# Patient Record
Sex: FEMALE | Race: WHITE | Employment: OTHER | URBAN - METROPOLITAN AREA
[De-identification: names, ages, dates, MRNs, and addresses within clinical notes are randomized per-mention and may not be internally consistent; named-entity substitution may affect disease eponyms.]

---

## 2018-04-03 PROBLEM — Z51.81 ENCOUNTER FOR MONITORING CHRONIC NSAID THERAPY: Status: ACTIVE | Noted: 2018-04-03

## 2018-04-03 PROBLEM — I73.00 RAYNAUD'S DISEASE WITHOUT GANGRENE: Status: ACTIVE | Noted: 2018-04-03

## 2018-04-03 PROBLEM — Z79.1 ENCOUNTER FOR MONITORING CHRONIC NSAID THERAPY: Status: ACTIVE | Noted: 2018-04-03

## 2018-04-03 PROBLEM — M54.2 CHRONIC NECK PAIN: Status: ACTIVE | Noted: 2018-04-03

## 2018-04-03 PROBLEM — Z20.820 EXPOSURE TO VARICELLA ZOSTER VIRUS (VZV): Status: ACTIVE | Noted: 2018-04-03

## 2018-04-03 PROBLEM — R53.82 CHRONIC FATIGUE: Status: ACTIVE | Noted: 2018-04-03

## 2018-04-03 PROBLEM — M54.31 SCIATICA OF RIGHT SIDE: Status: ACTIVE | Noted: 2018-04-03

## 2018-04-03 PROBLEM — G89.29 CHRONIC NECK PAIN: Status: ACTIVE | Noted: 2018-04-03

## 2018-05-03 PROBLEM — Z00.00 ROUTINE GENERAL MEDICAL EXAMINATION AT A HEALTH CARE FACILITY: Status: ACTIVE | Noted: 2018-05-03

## 2018-05-03 PROBLEM — Z23 ENCOUNTER FOR IMMUNIZATION: Status: ACTIVE | Noted: 2018-05-03

## 2018-08-30 PROBLEM — L03.011 PARONYCHIA OF FINGER OF RIGHT HAND: Status: ACTIVE | Noted: 2018-08-30

## 2019-07-10 PROBLEM — Z13.820 SCREENING FOR OSTEOPOROSIS: Status: ACTIVE | Noted: 2019-07-10

## 2019-08-23 ENCOUNTER — HOSPITAL ENCOUNTER (OUTPATIENT)
Dept: MAMMOGRAPHY | Age: 79
Discharge: HOME | End: 2019-08-23
Attending: FAMILY MEDICINE
Payer: COMMERCIAL

## 2019-08-23 DIAGNOSIS — Z13.820 SCREENING FOR OSTEOPOROSIS: ICD-10-CM

## 2019-08-23 DIAGNOSIS — Z00.00 ROUTINE GENERAL MEDICAL EXAMINATION AT A HEALTH CARE FACILITY: ICD-10-CM

## 2019-08-23 PROBLEM — M85.852 OSTEOPENIA OF NECK OF LEFT FEMUR: Status: ACTIVE | Noted: 2019-08-23

## 2019-08-23 PROCEDURE — 77080 DXA BONE DENSITY AXIAL: CPT

## 2019-08-23 NOTE — PROGRESS NOTES
Please let her know her scan showed osteopenia bordering on osteoporosis of the L femoral neck. At this point we just need to make sure her calcium and D levels are adequate. I'll check a D level when I see her back. We'll repeat the scan in 2 years.

## 2019-09-19 PROBLEM — Z13.820 SCREENING FOR OSTEOPOROSIS: Status: RESOLVED | Noted: 2019-07-10 | Resolved: 2019-09-19

## 2019-09-24 PROBLEM — Z00.00 ROUTINE GENERAL MEDICAL EXAMINATION AT A HEALTH CARE FACILITY: Status: RESOLVED | Noted: 2018-05-03 | Resolved: 2019-09-24

## 2019-09-24 PROBLEM — Z23 ENCOUNTER FOR IMMUNIZATION: Status: RESOLVED | Noted: 2018-05-03 | Resolved: 2019-09-24

## 2020-02-10 PROBLEM — H65.03 NON-RECURRENT ACUTE SEROUS OTITIS MEDIA OF BOTH EARS: Status: ACTIVE | Noted: 2020-02-10

## 2020-02-10 PROBLEM — J01.00 ACUTE NON-RECURRENT MAXILLARY SINUSITIS: Status: ACTIVE | Noted: 2020-02-10

## 2020-02-10 PROBLEM — R68.89 FLU-LIKE SYMPTOMS: Status: ACTIVE | Noted: 2020-02-10

## 2020-07-23 PROBLEM — E55.9 VITAMIN D DEFICIENCY: Status: ACTIVE | Noted: 2020-07-23

## 2020-07-23 PROBLEM — R53.83 CAREGIVER WITH FATIGUE: Status: ACTIVE | Noted: 2020-07-23

## 2020-07-23 PROBLEM — Z00.00 ENCOUNTER FOR MEDICARE ANNUAL WELLNESS EXAM: Status: ACTIVE | Noted: 2020-07-23

## 2020-07-23 PROBLEM — K02.9 DENTAL CARIES: Status: ACTIVE | Noted: 2020-07-23

## 2020-08-17 PROBLEM — N80.9 ENDOMETRIOSIS: Status: ACTIVE | Noted: 2020-08-17

## 2020-08-17 PROBLEM — R10.31 RIGHT LOWER QUADRANT ABDOMINAL PAIN: Status: ACTIVE | Noted: 2020-08-17

## 2020-08-21 ENCOUNTER — HOSPITAL ENCOUNTER (OUTPATIENT)
Dept: CT IMAGING | Age: 80
Discharge: HOME | End: 2020-08-21
Attending: FAMILY MEDICINE

## 2020-08-21 DIAGNOSIS — N80.9 ENDOMETRIOSIS: ICD-10-CM

## 2020-08-21 DIAGNOSIS — R10.31 RIGHT LOWER QUADRANT ABDOMINAL PAIN: ICD-10-CM

## 2020-08-21 RX ORDER — SODIUM CHLORIDE 0.9 % (FLUSH) 0.9 %
10 SYRINGE (ML) INJECTION
Status: ACTIVE | OUTPATIENT
Start: 2020-08-21 | End: 2020-08-21

## 2020-08-22 PROBLEM — Z00.00 ENCOUNTER FOR MEDICARE ANNUAL WELLNESS EXAM: Status: RESOLVED | Noted: 2020-07-23 | Resolved: 2020-08-22

## 2020-08-24 NOTE — PROGRESS NOTES
CT negative except for constipation. If she hasn't done a completely cleanout in a while she should attempt it. Go to the pharmacy and buy two 32oz bottles of gatorade and a 238g bottle of Miralax. Mix half the Miralax into each of the 32oz bottles. Shake well. Refrigerating can help with the bitter taste. Chug one, if no bowel movement within 2 hours, shake and chug the other. Pt should plan on doing this when they have nothing to do for 12-24 hours. There should be significant stooling, may look dark/green, followed by stools of only clear water. Once that's done I'd recommend getting on a regular fiber supplement daily. I prefer the psyllium capsules, take 4-5 every morning with 12-16oz of water.

## 2021-01-05 ENCOUNTER — HOSPITAL ENCOUNTER (OUTPATIENT)
Dept: GENERAL RADIOLOGY | Age: 81
Discharge: HOME | End: 2021-01-05

## 2021-01-05 DIAGNOSIS — K58.2 IRRITABLE BOWEL SYNDROME WITH BOTH CONSTIPATION AND DIARRHEA: ICD-10-CM

## 2021-01-05 PROBLEM — M25.551 RIGHT HIP PAIN: Status: ACTIVE | Noted: 2021-01-05

## 2021-01-05 PROBLEM — M53.3 PAIN OF RIGHT SACROILIAC JOINT: Status: ACTIVE | Noted: 2021-01-05

## 2021-01-05 PROBLEM — F43.21 GRIEF: Status: ACTIVE | Noted: 2021-01-05

## 2021-02-02 ENCOUNTER — HOSPITAL ENCOUNTER (OUTPATIENT)
Dept: LAB | Age: 81
Discharge: HOME | End: 2021-02-02

## 2021-02-02 PROCEDURE — 88305 TISSUE EXAM BY PATHOLOGIST: CPT

## 2021-02-02 PROCEDURE — 88312 SPECIAL STAINS GROUP 1: CPT

## 2021-04-16 PROBLEM — N18.31 STAGE 3A CHRONIC KIDNEY DISEASE (HCC): Status: ACTIVE | Noted: 2021-04-16

## 2021-04-16 PROBLEM — K25.3 ACUTE GASTRIC ULCER WITHOUT HEMORRHAGE OR PERFORATION: Status: ACTIVE | Noted: 2021-04-16

## 2022-03-17 ENCOUNTER — APPOINTMENT (OUTPATIENT)
Dept: GENERAL RADIOLOGY | Age: 82
End: 2022-03-17
Attending: EMERGENCY MEDICINE
Payer: MEDICARE

## 2022-03-17 ENCOUNTER — HOSPITAL ENCOUNTER (OUTPATIENT)
Age: 82
Setting detail: OBSERVATION
Discharge: HOME | End: 2022-03-18
Attending: EMERGENCY MEDICINE | Admitting: INTERNAL MEDICINE
Payer: MEDICARE

## 2022-03-17 DIAGNOSIS — I95.1 ORTHOSTATIC HYPOTENSION: Primary | ICD-10-CM

## 2022-03-17 DIAGNOSIS — R55 SYNCOPE, UNSPECIFIED SYNCOPE TYPE: ICD-10-CM

## 2022-03-17 PROBLEM — R06.02 SOB (SHORTNESS OF BREATH): Status: ACTIVE | Noted: 2022-03-17

## 2022-03-17 LAB
ABO + RH BLD: NORMAL
ALBUMIN SERPL-MCNC: 3.4 G/DL (ref 3.2–4.6)
ALBUMIN/GLOB SERPL: 0.9 {RATIO} (ref 1.2–3.5)
ALP SERPL-CCNC: 83 U/L (ref 50–136)
ALT SERPL-CCNC: 42 U/L (ref 12–65)
ANION GAP SERPL CALC-SCNC: 2 MMOL/L (ref 7–16)
APPEARANCE UR: CLEAR
AST SERPL-CCNC: 27 U/L (ref 15–37)
ATRIAL RATE: 55 BPM
B PERT DNA SPEC QL NAA+PROBE: NOT DETECTED
BASOPHILS # BLD: 0 K/UL (ref 0–0.2)
BASOPHILS NFR BLD: 0 % (ref 0–2)
BILIRUB SERPL-MCNC: 0.3 MG/DL (ref 0.2–1.1)
BILIRUB UR QL: NEGATIVE
BLOOD GROUP ANTIBODIES SERPL: NORMAL
BNP SERPL-MCNC: 598 PG/ML
BORDETELLA PARAPERTUSSIS PCR, BORPAR: NOT DETECTED
BUN SERPL-MCNC: 11 MG/DL (ref 8–23)
C PNEUM DNA SPEC QL NAA+PROBE: NOT DETECTED
CALCIUM SERPL-MCNC: 8.9 MG/DL (ref 8.3–10.4)
CALCULATED P AXIS, ECG09: 91 DEGREES
CALCULATED R AXIS, ECG10: 90 DEGREES
CALCULATED T AXIS, ECG11: 70 DEGREES
CHLORIDE SERPL-SCNC: 101 MMOL/L (ref 98–107)
CO2 SERPL-SCNC: 30 MMOL/L (ref 21–32)
COLOR UR: YELLOW
COVID-19 RAPID TEST, COVR: NOT DETECTED
CREAT SERPL-MCNC: 0.8 MG/DL (ref 0.6–1)
DIAGNOSIS, 93000: NORMAL
DIFFERENTIAL METHOD BLD: ABNORMAL
EOSINOPHIL # BLD: 0 K/UL (ref 0–0.8)
EOSINOPHIL NFR BLD: 1 % (ref 0.5–7.8)
ERYTHROCYTE [DISTWIDTH] IN BLOOD BY AUTOMATED COUNT: 13.2 % (ref 11.9–14.6)
FLUAV H3 RNA SPEC QL NAA+PROBE: DETECTED
FLUBV RNA SPEC QL NAA+PROBE: NOT DETECTED
GLOBULIN SER CALC-MCNC: 3.6 G/DL (ref 2.3–3.5)
GLUCOSE BLD STRIP.AUTO-MCNC: 106 MG/DL (ref 65–100)
GLUCOSE SERPL-MCNC: 93 MG/DL (ref 65–100)
GLUCOSE UR STRIP.AUTO-MCNC: NEGATIVE MG/DL
HADV DNA SPEC QL NAA+PROBE: NOT DETECTED
HCOV 229E RNA SPEC QL NAA+PROBE: NOT DETECTED
HCOV HKU1 RNA SPEC QL NAA+PROBE: NOT DETECTED
HCOV NL63 RNA SPEC QL NAA+PROBE: NOT DETECTED
HCOV OC43 RNA SPEC QL NAA+PROBE: NOT DETECTED
HCT VFR BLD AUTO: 37.1 % (ref 35.8–46.3)
HGB BLD-MCNC: 12.2 G/DL (ref 11.7–15.4)
HGB UR QL STRIP: NEGATIVE
HMPV RNA SPEC QL NAA+PROBE: NOT DETECTED
HPIV1 RNA SPEC QL NAA+PROBE: NOT DETECTED
HPIV2 RNA SPEC QL NAA+PROBE: NOT DETECTED
HPIV3 RNA SPEC QL NAA+PROBE: NOT DETECTED
HPIV4 RNA SPEC QL NAA+PROBE: NOT DETECTED
IMM GRANULOCYTES # BLD AUTO: 0 K/UL (ref 0–0.5)
IMM GRANULOCYTES NFR BLD AUTO: 0 % (ref 0–5)
KETONES UR QL STRIP.AUTO: ABNORMAL MG/DL
LACTATE SERPL-SCNC: 0.7 MMOL/L (ref 0.4–2)
LACTATE SERPL-SCNC: 1.2 MMOL/L (ref 0.4–2)
LEUKOCYTE ESTERASE UR QL STRIP.AUTO: NEGATIVE
LYMPHOCYTES # BLD: 0.8 K/UL (ref 0.5–4.6)
LYMPHOCYTES NFR BLD: 16 % (ref 13–44)
M PNEUMO DNA SPEC QL NAA+PROBE: NOT DETECTED
MAGNESIUM SERPL-MCNC: 2.2 MG/DL (ref 1.8–2.4)
MCH RBC QN AUTO: 31.5 PG (ref 26.1–32.9)
MCHC RBC AUTO-ENTMCNC: 32.9 G/DL (ref 31.4–35)
MCV RBC AUTO: 95.9 FL (ref 79.6–97.8)
MONOCYTES # BLD: 0.9 K/UL (ref 0.1–1.3)
MONOCYTES NFR BLD: 17 % (ref 4–12)
NEUTS SEG # BLD: 3.5 K/UL (ref 1.7–8.2)
NEUTS SEG NFR BLD: 66 % (ref 43–78)
NITRITE UR QL STRIP.AUTO: NEGATIVE
NRBC # BLD: 0 K/UL (ref 0–0.2)
P-R INTERVAL, ECG05: 188 MS
PH UR STRIP: 7 [PH] (ref 5–9)
PLATELET # BLD AUTO: 231 K/UL (ref 150–450)
PMV BLD AUTO: 9.8 FL (ref 9.4–12.3)
POTASSIUM SERPL-SCNC: 4.1 MMOL/L (ref 3.5–5.1)
PROCALCITONIN SERPL-MCNC: <0.05 NG/ML (ref 0–0.49)
PROT SERPL-MCNC: 7 G/DL (ref 6.3–8.2)
PROT UR STRIP-MCNC: NEGATIVE MG/DL
Q-T INTERVAL, ECG07: 451 MS
QRS DURATION, ECG06: 100 MS
QTC CALCULATION (BEZET), ECG08: 436 MS
RBC # BLD AUTO: 3.87 M/UL (ref 4.05–5.2)
RSV RNA SPEC QL NAA+PROBE: NOT DETECTED
RV+EV RNA SPEC QL NAA+PROBE: NOT DETECTED
SARS-COV-2 PCR, COVPCR: NOT DETECTED
SERVICE CMNT-IMP: ABNORMAL
SODIUM SERPL-SCNC: 133 MMOL/L (ref 136–145)
SOURCE, COVRS: NORMAL
SP GR UR REFRACTOMETRY: 1 (ref 1–1.02)
SPECIMEN EXP DATE BLD: NORMAL
UROBILINOGEN UR QL STRIP.AUTO: 0.2 EU/DL (ref 0.2–1)
VENTRICULAR RATE, ECG03: 56 BPM
WBC # BLD AUTO: 5.3 K/UL (ref 4.3–11.1)

## 2022-03-17 PROCEDURE — 93005 ELECTROCARDIOGRAM TRACING: CPT | Performed by: PHYSICIAN ASSISTANT

## 2022-03-17 PROCEDURE — 87150 DNA/RNA AMPLIFIED PROBE: CPT

## 2022-03-17 PROCEDURE — 96360 HYDRATION IV INFUSION INIT: CPT

## 2022-03-17 PROCEDURE — 74011250637 HC RX REV CODE- 250/637: Performed by: INTERNAL MEDICINE

## 2022-03-17 PROCEDURE — 83605 ASSAY OF LACTIC ACID: CPT

## 2022-03-17 PROCEDURE — 74011250636 HC RX REV CODE- 250/636: Performed by: INTERNAL MEDICINE

## 2022-03-17 PROCEDURE — 85025 COMPLETE CBC W/AUTO DIFF WBC: CPT

## 2022-03-17 PROCEDURE — 65270000029 HC RM PRIVATE

## 2022-03-17 PROCEDURE — G0378 HOSPITAL OBSERVATION PER HR: HCPCS

## 2022-03-17 PROCEDURE — 83880 ASSAY OF NATRIURETIC PEPTIDE: CPT

## 2022-03-17 PROCEDURE — 71045 X-RAY EXAM CHEST 1 VIEW: CPT

## 2022-03-17 PROCEDURE — 0202U NFCT DS 22 TRGT SARS-COV-2: CPT

## 2022-03-17 PROCEDURE — 74011000250 HC RX REV CODE- 250: Performed by: INTERNAL MEDICINE

## 2022-03-17 PROCEDURE — 86900 BLOOD TYPING SEROLOGIC ABO: CPT

## 2022-03-17 PROCEDURE — 74011250637 HC RX REV CODE- 250/637: Performed by: HOSPITALIST

## 2022-03-17 PROCEDURE — 84145 PROCALCITONIN (PCT): CPT

## 2022-03-17 PROCEDURE — 81003 URINALYSIS AUTO W/O SCOPE: CPT

## 2022-03-17 PROCEDURE — 80053 COMPREHEN METABOLIC PANEL: CPT

## 2022-03-17 PROCEDURE — 82962 GLUCOSE BLOOD TEST: CPT

## 2022-03-17 PROCEDURE — 83735 ASSAY OF MAGNESIUM: CPT

## 2022-03-17 PROCEDURE — 74011250636 HC RX REV CODE- 250/636: Performed by: EMERGENCY MEDICINE

## 2022-03-17 PROCEDURE — 87635 SARS-COV-2 COVID-19 AMP PRB: CPT

## 2022-03-17 PROCEDURE — 87040 BLOOD CULTURE FOR BACTERIA: CPT

## 2022-03-17 PROCEDURE — 99285 EMERGENCY DEPT VISIT HI MDM: CPT

## 2022-03-17 RX ORDER — AZITHROMYCIN 250 MG/1
500 TABLET, FILM COATED ORAL DAILY
Status: DISCONTINUED | OUTPATIENT
Start: 2022-03-17 | End: 2022-03-18

## 2022-03-17 RX ORDER — SODIUM CHLORIDE 0.9 % (FLUSH) 0.9 %
5-40 SYRINGE (ML) INJECTION AS NEEDED
Status: DISCONTINUED | OUTPATIENT
Start: 2022-03-17 | End: 2022-03-18 | Stop reason: HOSPADM

## 2022-03-17 RX ORDER — AZITHROMYCIN 250 MG/1
500 TABLET, FILM COATED ORAL DAILY
Status: DISCONTINUED | OUTPATIENT
Start: 2022-03-18 | End: 2022-03-17

## 2022-03-17 RX ORDER — SODIUM CHLORIDE, SODIUM LACTATE, POTASSIUM CHLORIDE, CALCIUM CHLORIDE 600; 310; 30; 20 MG/100ML; MG/100ML; MG/100ML; MG/100ML
100 INJECTION, SOLUTION INTRAVENOUS CONTINUOUS
Status: DISCONTINUED | OUTPATIENT
Start: 2022-03-17 | End: 2022-03-18

## 2022-03-17 RX ORDER — ONDANSETRON 2 MG/ML
4 INJECTION INTRAMUSCULAR; INTRAVENOUS
Status: DISCONTINUED | OUTPATIENT
Start: 2022-03-17 | End: 2022-03-18 | Stop reason: HOSPADM

## 2022-03-17 RX ORDER — HYDROCODONE BITARTRATE AND HOMATROPINE METHYLBROMIDE 1.5; 5 MG/5ML; MG/5ML
5 SYRUP ORAL
Status: DISCONTINUED | OUTPATIENT
Start: 2022-03-17 | End: 2022-03-17

## 2022-03-17 RX ORDER — ONDANSETRON 4 MG/1
4 TABLET, ORALLY DISINTEGRATING ORAL
Status: DISCONTINUED | OUTPATIENT
Start: 2022-03-17 | End: 2022-03-18 | Stop reason: HOSPADM

## 2022-03-17 RX ORDER — POLYETHYLENE GLYCOL 3350 17 G/17G
17 POWDER, FOR SOLUTION ORAL DAILY PRN
Status: DISCONTINUED | OUTPATIENT
Start: 2022-03-17 | End: 2022-03-18 | Stop reason: HOSPADM

## 2022-03-17 RX ORDER — ACETAMINOPHEN 325 MG/1
650 TABLET ORAL
Status: DISCONTINUED | OUTPATIENT
Start: 2022-03-17 | End: 2022-03-18 | Stop reason: HOSPADM

## 2022-03-17 RX ORDER — ENOXAPARIN SODIUM 100 MG/ML
40 INJECTION SUBCUTANEOUS DAILY
Status: DISCONTINUED | OUTPATIENT
Start: 2022-03-18 | End: 2022-03-18 | Stop reason: HOSPADM

## 2022-03-17 RX ORDER — ACETAMINOPHEN 650 MG/1
650 SUPPOSITORY RECTAL
Status: DISCONTINUED | OUTPATIENT
Start: 2022-03-17 | End: 2022-03-18 | Stop reason: HOSPADM

## 2022-03-17 RX ORDER — SODIUM CHLORIDE 0.9 % (FLUSH) 0.9 %
5-40 SYRINGE (ML) INJECTION EVERY 8 HOURS
Status: DISCONTINUED | OUTPATIENT
Start: 2022-03-17 | End: 2022-03-18 | Stop reason: HOSPADM

## 2022-03-17 RX ORDER — GUAIFENESIN 100 MG/5ML
100 SOLUTION ORAL
Status: DISCONTINUED | OUTPATIENT
Start: 2022-03-17 | End: 2022-03-18 | Stop reason: HOSPADM

## 2022-03-17 RX ORDER — ALBUTEROL SULFATE 0.83 MG/ML
2.5 SOLUTION RESPIRATORY (INHALATION)
Status: DISCONTINUED | OUTPATIENT
Start: 2022-03-17 | End: 2022-03-18 | Stop reason: HOSPADM

## 2022-03-17 RX ADMIN — ACETAMINOPHEN 650 MG: 325 TABLET ORAL at 19:50

## 2022-03-17 RX ADMIN — SODIUM CHLORIDE 1000 ML: 900 INJECTION, SOLUTION INTRAVENOUS at 13:48

## 2022-03-17 RX ADMIN — SODIUM CHLORIDE 1000 ML: 900 INJECTION, SOLUTION INTRAVENOUS at 15:08

## 2022-03-17 RX ADMIN — AZITHROMYCIN DIHYDRATE 500 MG: 250 TABLET, FILM COATED ORAL at 19:50

## 2022-03-17 RX ADMIN — SODIUM CHLORIDE, POTASSIUM CHLORIDE, SODIUM LACTATE AND CALCIUM CHLORIDE 100 ML/HR: 600; 310; 30; 20 INJECTION, SOLUTION INTRAVENOUS at 21:50

## 2022-03-17 RX ADMIN — GUAIFENESIN 100 MG: 200 SOLUTION ORAL at 21:50

## 2022-03-17 RX ADMIN — SODIUM CHLORIDE, PRESERVATIVE FREE 10 ML: 5 INJECTION INTRAVENOUS at 21:51

## 2022-03-17 NOTE — ED NOTES
TRANSFER - OUT REPORT:    Verbal report given to [facility] RN on [employer]  being transferred to [address] [place], [place] [address] for routine progression of care       Report consisted of patients Situation, Background, Assessment and   Recommendations(SBAR). Information from the following report(s) SBAR and ED Summary was reviewed with the receiving nurse. Lines:   Peripheral IV 03/17/22 Left Antecubital (Active)        Opportunity for questions and clarification was provided.       Patient transported with:   [employer]

## 2022-03-17 NOTE — ED PROVIDER NOTES
50-year-old female presents with complaint of worsening shortness of breath, nonproductive cough, subjective fever, chills, myalgias that are progressive worsening since Tuesday. Patient states that she received her COVID-19 vaccines. Denies any recent sick contacts. Denies chest pain, abdominal pain, nausea, vomiting, melena, hematochezia, dysuria, hematuria. Patient while in triage had witnessed syncopal episode and became unresponsive. Patient taken into RR1 where she was resuscitated and was alert and oriented x4. Vital signs were stable. The history is provided by the patient. No  was used. Generalized Body Aches  This is a recurrent problem. The current episode started more than 2 days ago. The problem occurs constantly. The problem has been gradually worsening. Associated symptoms include shortness of breath. Pertinent negatives include no chest pain, no abdominal pain and no headaches. No past medical history on file. Past Surgical History:   Procedure Laterality Date    HX CERVICAL LAMINECTOMY  1998    HX TONSILLECTOMY      HX TUBAL LIGATION           Family History:   Problem Relation Age of Onset    Heart Disease Mother     Diabetes Father     Diabetes Brother     Heart Disease Brother        Social History     Socioeconomic History    Marital status:      Spouse name: Not on file    Number of children: Not on file    Years of education: Not on file    Highest education level: Not on file   Occupational History    Not on file   Tobacco Use    Smoking status: Never Smoker    Smokeless tobacco: Never Used   Substance and Sexual Activity    Alcohol use: No    Drug use: No    Sexual activity: Yes     Partners: Male     Comment: Monogamous with one person x 57 tears.     Other Topics Concern    Not on file   Social History Narrative    Not on file     Social Determinants of Health     Financial Resource Strain:     Difficulty of Paying Living Expenses: Not on file   Food Insecurity:     Worried About Running Out of Food in the Last Year: Not on file    Jhonathan of Food in the Last Year: Not on file   Transportation Needs:     Lack of Transportation (Medical): Not on file    Lack of Transportation (Non-Medical): Not on file   Physical Activity:     Days of Exercise per Week: Not on file    Minutes of Exercise per Session: Not on file   Stress:     Feeling of Stress : Not on file   Social Connections:     Frequency of Communication with Friends and Family: Not on file    Frequency of Social Gatherings with Friends and Family: Not on file    Attends Worship Services: Not on file    Active Member of [address] [place] [address] [employer] or Organizations: Not on file    Attends Club or Organization Meetings: Not on file    Marital Status: Not on file   Intimate Partner Violence:     Fear of Current or Ex-Partner: Not on file    Emotionally Abused: Not on file    Physically Abused: Not on file    Sexually Abused: Not on file   Housing Stability:     Unable to Pay for Housing in the Last Year: Not on file    Number of [place] in the Last Year: Not on file    Unstable Housing in the Last Year: Not on file         ALLERGIES: Prevnar 13 [pneumoc 13-noe conj-dip cr(pf)], Percocet [oxycodone-acetaminophen], and Septra [sulfamethoprim]    Review of Systems   Constitutional: Positive for chills, fatigue and fever. HENT: Positive for congestion. Negative for rhinorrhea, sore throat and trouble swallowing. Eyes: Negative for redness and visual disturbance. Respiratory: Positive for cough and shortness of breath. Cardiovascular: Negative for chest pain and palpitations. Gastrointestinal: Negative for abdominal distention, abdominal pain, anal bleeding, diarrhea, nausea and vomiting. Genitourinary: Negative for dysuria and flank pain. Musculoskeletal: Positive for myalgias. Negative for arthralgias, back pain, neck pain and neck stiffness.    Skin: Negative for color change, rash and wound. Neurological: Negative for dizziness, syncope, weakness, light-headedness and headaches. Hematological: Does not bruise/bleed easily. Psychiatric/Behavioral: Negative for agitation and confusion. Vitals:    03/17/22 1239   BP: (!) 105/49   Pulse: (!) 57   Resp: 19   Temp: 99.9 °F (37.7 °C)   SpO2: 100%            Physical Exam  Vitals and nursing note reviewed. Constitutional:       Appearance: She is ill-appearing. HENT:      Head: Normocephalic and atraumatic. Comments: Atraumatic. Nose: Nose normal.      Mouth/Throat:      Mouth: Mucous membranes are dry. Pharynx: No posterior oropharyngeal erythema. Comments: Pale mucous membranes. Eyes:      Extraocular Movements: Extraocular movements intact. Pupils: Pupils are equal, round, and reactive to light. Neck:      Comments: No nuchal rigidity. Cardiovascular:      Rate and Rhythm: Normal rate. Pulses: Normal pulses. Heart sounds: Normal heart sounds. Pulmonary:      Effort: Pulmonary effort is normal.      Breath sounds: Normal breath sounds. Abdominal:      General: Bowel sounds are normal. There is no distension. Palpations: Abdomen is soft. There is no mass. Tenderness: There is no abdominal tenderness. There is no guarding or rebound. Hernia: No hernia is present. Comments: Soft, nontender, nondistended. No rebound or guarding. Musculoskeletal:         General: No swelling, tenderness or deformity. Normal range of motion. Cervical back: Normal range of motion. No rigidity. Skin:     General: Skin is warm. Coloration: Skin is pale. Findings: No erythema or rash. Neurological:      General: No focal deficit present. Mental Status: She is alert and oriented to person, place, and time. Cranial Nerves: No cranial nerve deficit. Sensory: No sensory deficit. Motor: No weakness. Comments: Strength 5/5 throughout.   No facial droop. No dysarthria. No aphasia. MDM  Number of Diagnoses or Management Options  Orthostatic hypotension: new and requires workup  Syncope, unspecified syncope type: new and requires workup  Diagnosis management comments: Pt with syncopal episode in triage. Blood pressure obtained with patient sitting up in bed with systolic noted to be in the 80s. Patient orthostatic. Patient given 2 L normal saline IV fluid bolus. Afebrile. No leukocytosis. X-ray clear. Covid negative. Urine negative for UTI. No indication for IV antibiotics.   Will consult hospitalist for admission       Amount and/or Complexity of Data Reviewed  Clinical lab tests: ordered and reviewed  Tests in the radiology section of CPT®: ordered and reviewed  Tests in the medicine section of CPT®: ordered and reviewed  Review and summarize past medical records: yes  Discuss the patient with other providers: yes  Independent visualization of images, tracings, or specimens: yes    Risk of Complications, Morbidity, and/or Mortality  Presenting problems: high  Diagnostic procedures: moderate  Management options: high  General comments: Results Include:    Recent Results (from the past 24 hour(s))  -COVID-19 RAPID TEST:   Collection Time: 03/17/22 12:41 PM       Result                      Value             Ref Range           Specimen source             NASAL                                 COVID-19 rapid test         Not detected      NOTD           -LACTIC ACID:   Collection Time: 03/17/22  1:22 PM       Result                      Value             Ref Range           Lactic acid                 0.7               0.4 - 2.0 MM*  -METABOLIC PANEL, COMPREHENSIVE:   Collection Time: 03/17/22  1:22 PM       Result                      Value             Ref Range           Sodium                      133 (L)           136 - 145 mm*       Potassium                   4.1               3.5 - 5.1 mm*       Chloride                    101 98 - 107 mmo*       CO2                         30                21 - 32 mmol*       Anion gap                   2 (L)             7 - 16 mmol/L       Glucose                     93                65 - 100 mg/*       BUN                         11                8 - 23 MG/DL        Creatinine                  0.80              0.6 - 1.0 MG*       GFR est AA                  >60               >60 ml/min/1*       GFR est non-AA              >60               >60 ml/min/1*       Calcium                     8.9               8.3 - 10.4 M*       Bilirubin, total            0.3               0.2 - 1.1 MG*       ALT (SGPT)                  42                12 - 65 U/L         AST (SGOT)                  27                15 - 37 U/L         Alk.  phosphatase            83                50 - 136 U/L        Protein, total              7.0               6.3 - 8.2 g/*       Albumin                     3.4               3.2 - 4.6 g/*       Globulin                    3.6 (H)           2.3 - 3.5 g/*       A-G Ratio                   0.9 (L)           1.2 - 3.5      -CBC WITH AUTOMATED DIFF:   Collection Time: 03/17/22  1:22 PM       Result                      Value             Ref Range           WBC                         5.3               4.3 - 11.1 K*       RBC                         3.87 (L)          4.05 - 5.2 M*       HGB                         12.2              11.7 - 15.4 *       HCT                         37.1              35.8 - 46.3 %       MCV                         95.9              79.6 - 97.8 *       MCH                         31.5              26.1 - 32.9 *       MCHC                        32.9              31.4 - 35.0 *       RDW                         13.2              11.9 - 14.6 %       PLATELET                    231               150 - 450 K/*       MPV                         9.8               9.4 - 12.3 FL       ABSOLUTE NRBC               0.00              0.0 - 0.2 K/*       DF AUTOMATED                             NEUTROPHILS                 66                43 - 78 %           LYMPHOCYTES                 16                13 - 44 %           MONOCYTES                   17 (H)            4.0 - 12.0 %        EOSINOPHILS                 1                 0.5 - 7.8 %         BASOPHILS                   0                 0.0 - 2.0 %         IMMATURE GRANULOCYTES       0                 0.0 - 5.0 %         ABS. NEUTROPHILS            3.5               1.7 - 8.2 K/*       ABS. LYMPHOCYTES            0.8               0.5 - 4.6 K/*       ABS. MONOCYTES              0.9               0.1 - 1.3 K/*       ABS. EOSINOPHILS            0.0               0.0 - 0.8 K/*       ABS. BASOPHILS              0.0               0.0 - 0.2 K/*       ABS. IMM.  GRANS.            0.0               0.0 - 0.5 K/*  -PROCALCITONIN:   Collection Time: 03/17/22  1:22 PM       Result                      Value             Ref Range           Procalcitonin               <0.05             0.00 - 0.49 *  -NT-PRO BNP:   Collection Time: 03/17/22  1:22 PM       Result                      Value             Ref Range           NT pro-BNP                  598 (H)           <450 PG/ML     -MAGNESIUM:   Collection Time: 03/17/22  1:22 PM       Result                      Value             Ref Range           Magnesium                   2.2               1.8 - 2.4 mg*  -GLUCOSE, POC:   Collection Time: 03/17/22  1:23 PM       Result                      Value             Ref Range           Glucose (POC)               106 (H)           65 - 100 mg/*       Performed by                                                  Denia  -EKG, 12 LEAD, INITIAL:   Collection Time: 03/17/22  1:23 PM       Result                      Value             Ref Range           Ventricular Rate            56                BPM                 Atrial Rate                 55                BPM                 P-R Interval 188               ms                  QRS Duration                100               ms                  Q-T Interval                451               ms                  QTC Calculation (Bezet)     436               ms                  Calculated P Axis           91                degrees             Calculated R Axis           90                degrees             Calculated T Axis           70                degrees             Diagnosis                                                     Sinus rhythm Borderline right axis deviation  Confirmed by DOTTIE AJ (), VY HURTADO ([id number]) on 3/17/2022 2:57:05 PM   -TYPE & SCREEN:   Collection Time: 03/17/22  1:25 PM       Result                      Value             Ref Range           Crossmatch Expiration                                         03/20/2022,2359       ABO/Rh(D)                   Orlena Gibson POSITIVE                            Antibody screen             NEG                              -LACTIC ACID:   Collection Time: 03/17/22  2:11 PM       Result                      Value             Ref Range           Lactic acid                 1.2               0.4 - 2.0 MM*  -URINALYSIS W/ RFLX MICROSCOPIC:   Collection Time: 03/17/22  3:12 PM       Result                      Value             Ref Range           Color                       YELLOW                                Appearance                  CLEAR                                 Specific gravity            1.005             1.001 - 1.02*       pH (UA)                     7.0               5.0 - 9.0           Protein                     Negative          NEG mg/dL           Glucose                     Negative          mg/dL               Ketone                      TRACE (A)         NEG mg/dL           Bilirubin                   Negative          NEG                 Blood                       Negative          NEG                 Urobilinogen                0.2               0.2 - 1.0 EU* Nitrites                    Negative          NEG                 Leukocyte Esterase          Negative          NEG          '      Patient Progress  Patient progress: stable    ED Course as of 03/17/22 1532   Thu Mar 17, 2022   1329 COVID-19 rapid test: Not detected [DF]   1345 GLUCOSE,FAST - POC(!): 106 [DF]   1411 CXR FINDINGS:      Lungs: Normal.     Cardiomediastinal silhouette: Normal.     Pleura: No pneumothorax or pleural effusion.     Osseous structures: Normal.        IMPRESSION  No radiographic evidence of acute cardiopulmonary disease. [DF]      ED Course User Index  [DF] Angela Thompson MD       EKG    Date/Time: 3/17/2022 1:49 PM  Performed by: Angela Thompson MD  Authorized by: Angela Thompson MD     ECG reviewed by ED Physician in the absence of a cardiologist: yes    Rate:     ECG rate:  56    ECG rate assessment: bradycardic    Rhythm:     Rhythm: sinus bradycardia    Ectopy:     Ectopy: none    QRS:     QRS axis:  Normal    QRS intervals:  Normal  Conduction:     Conduction: normal    ST segments:     ST segments:  Normal  T waves:     T waves: normal                         Nathan Hammonds MD; 3/17/2022 @1:44 PM Voice dictation software was used during the making of this note. This software is not perfect and grammatical and other typographical errors may be present.   This note has not been proofread for errors.  ===================================================================

## 2022-03-17 NOTE — H&P
Admit date: 3/17/2022   Name:  Rosemary Guzmán   Age:  [de-identified] y.o.   :  1940   MRN:  [id number]   PCP:  Satish Tanner MD   Provider:  Kavitha Randolph MD      ASSESSMENT AND PLAN  28-year-old female with no significant past medical history that presents in the setting of cough, weakness and syncopal episode. 1.  Syncopal episode likely in the setting of volume depletion  -Obtain orthostatic vital signs  -Start IV fluids  -Telemetry monitoring  -Obtain echocardiogram    2. Cough and generalized weakness concerning of upper respiratory tract infection  -Chest x-ray without signs of pneumonia  -Start azithromycin  -Rapid COVID-19 negative  -Obtain RPP with SARS-CoV-2 PCR  -Symptomatic management    3. Hyponatremia likely in the setting of volume depletion  -Start IV fluids  -Monitor sodium levels  -Avoid rapid correction    DVT prophylaxis with Lovenox    Full code    Patient aware of plan        CHIEF COMPLAINT:  Cough, weakness, syncopal episode      HISTORY OF PRESENT ILLNESS:  28-year-old female with no significant past medical history that presents in the setting of cough, weakness and syncopal episode. The patient states that for the past few days he has been presented with dry cough associated with generalized weakness and dyspnea on exertion. Her symptoms did not improve so she decided to come to the emergency department. Here in triage the patient felt lightheaded and syncopized. No head strike. Denied any chest pain or palpitations prior to the episode. Also denies any fevers, no abdominal pain, no nausea or vomiting. In the emergency department patient was found to be mildly hypotensive. Otherwise hemodynamically stable. Chest x-ray without signs of pneumonia. Rapid COVID-19 negative. She was given IV fluids. She will be admitted to the medical floors for further management.       Past medical history  No significant past medical history    Past Surgical History:   Procedure Laterality Date  HX CERVICAL LAMINECTOMY  1998    HX TONSILLECTOMY      HX TUBAL LIGATION            HOME MEDICATION:  Prior to Admission medications    Medication Sig Start Date End Date Taking? Authorizing Provider   L.acid/L.casei/B.bif/B.cherry/FOS (PROBIOTIC BLEND PO) Take  by mouth daily. Provider, Historical   multivitamin (ONE A DAY) tablet Take 1 Tablet by mouth daily. Provider, Historical   sucralfate (CARAFATE) 1 gram tablet TAKE 1 TABLET BY MOUTH FOUR TIMES DAILY 5/5/21   Lidia HURTADO MD   polyethylene glycol 3350 (MIRALAX PO) Take  by mouth as needed.     Provider, Historical         REVIEW OF SYSTEMS:  14 ROS negative except from stated on HPI      Social History     Tobacco Use    Smoking status: Never Smoker    Smokeless tobacco: Never Used   Substance Use Topics    Alcohol use: No    Drug use: No         Family History   Problem Relation Age of Onset    Heart Disease Mother     Diabetes Father     Diabetes Brother     Heart Disease Brother          Allergies   Allergen Reactions    Prevnar 13 [Pneumoc 13-Leah Conj-Dip Cr(Pf)] Swelling    Percocet [Oxycodone-Acetaminophen] Nausea and Vomiting    Septra [Sulfamethoprim] Hives         Vitals:    03/17/22 1425 03/17/22 1431 03/17/22 1444 03/17/22 1513   BP: (!) 85/72  128/60 (!) 132/58   Pulse: 74 67 67 76   Resp: 17 18 17 20   Temp:       SpO2: 97%  99% 97%         PHYSICAL EXAM:  General: Alert, oriented, NAD  HEENT: NC/AT, EOM are intact  Neck: supple, no JVD  Cardiovascular: RRR, S1, S2, no murmurs  Respiratory: Lungs are clear, no wheezes or rales  Abdomen: Soft, NT, ND  Back: No CVA tenderness, no paraspinal tenderness  Extremities: LE without pedal edema, no erythema  Neuro: A&O, CN are intact, no focal deficits  Skin: no rash or ulcers  Psych: good mood and affect      I have personally reviewed patients laboratory data showing  Lab Results   Component Value Date    WBC 5.3 03/17/2022    HGB 12.2 03/17/2022    HCT 37.1 03/17/2022    MCV 95.9 03/17/2022     03/17/2022     No results found for: CKMB  Lab Results   Component Value Date    GLU 93 03/17/2022     (L) 03/17/2022    K 4.1 03/17/2022    CO2 30 03/17/2022     03/17/2022    BUN 11 03/17/2022         I have personally reviewed patients EKG showing  Sinus rhythm, no acute ischemic changes      I have personally reviewed patients imaging showing  XR CHEST SNGL V   Final Result   No radiographic evidence of acute cardiopulmonary disease.                Likely length of stay more than 2 midnights

## 2022-03-17 NOTE — ED TRIAGE NOTES
Patient presents with ems from home via [facility]. Cough, chills and weakness since Tuesday. Doctor was concerned about pneumonia on virtual visit. 112/50bp 99% room air.

## 2022-03-17 NOTE — ED TRIAGE NOTES
First encounter with pt assumed care. Patient reports feeling fatigued at home, dry cough and headache for past 2 days. Patient alert, lying flat, able to answer this RNS questions.

## 2022-03-18 ENCOUNTER — APPOINTMENT (OUTPATIENT)
Dept: NON INVASIVE DIAGNOSTICS | Age: 82
End: 2022-03-18
Attending: INTERNAL MEDICINE
Payer: MEDICARE

## 2022-03-18 VITALS
OXYGEN SATURATION: 100 % | WEIGHT: 146.3 LBS | HEART RATE: 62 BPM | RESPIRATION RATE: 16 BRPM | TEMPERATURE: 98.4 F | HEIGHT: 67 IN | DIASTOLIC BLOOD PRESSURE: 53 MMHG | SYSTOLIC BLOOD PRESSURE: 146 MMHG | BODY MASS INDEX: 22.96 KG/M2

## 2022-03-18 PROBLEM — J11.1 INFLUENZA: Status: ACTIVE | Noted: 2022-03-18

## 2022-03-18 PROBLEM — R10.31 RIGHT LOWER QUADRANT ABDOMINAL PAIN: Status: ACTIVE | Noted: 2020-08-17

## 2022-03-18 PROBLEM — M25.551 RIGHT HIP PAIN: Status: ACTIVE | Noted: 2021-01-05

## 2022-03-18 PROBLEM — M54.31 SCIATICA OF RIGHT SIDE: Status: ACTIVE | Noted: 2018-04-03

## 2022-03-18 PROBLEM — E55.9 VITAMIN D DEFICIENCY: Status: ACTIVE | Noted: 2020-07-23

## 2022-03-18 PROBLEM — R68.89 FLU-LIKE SYMPTOMS: Status: ACTIVE | Noted: 2020-02-10

## 2022-03-18 PROBLEM — M53.3 PAIN OF RIGHT SACROILIAC JOINT: Status: ACTIVE | Noted: 2021-01-05

## 2022-03-18 LAB
ANION GAP SERPL CALC-SCNC: 4 MMOL/L (ref 7–16)
BASOPHILS # BLD: 0 K/UL (ref 0–0.2)
BASOPHILS NFR BLD: 0 % (ref 0–2)
BUN SERPL-MCNC: 10 MG/DL (ref 8–23)
CALCIUM SERPL-MCNC: 8.2 MG/DL (ref 8.3–10.4)
CHLORIDE SERPL-SCNC: 108 MMOL/L (ref 98–107)
CO2 SERPL-SCNC: 26 MMOL/L (ref 21–32)
CREAT SERPL-MCNC: 0.7 MG/DL (ref 0.6–1)
DIFFERENTIAL METHOD BLD: ABNORMAL
ECHO AO ASC DIAM: 2.8 CM
ECHO AO ASCENDING AORTA INDEX: 1.58 CM/M2
ECHO AO ROOT DIAM: 2.6 CM
ECHO AO ROOT INDEX: 1.47 CM/M2
ECHO AV AREA PEAK VELOCITY: 1.7 CM2
ECHO AV AREA VTI: 1.9 CM2
ECHO AV AREA/BSA PEAK VELOCITY: 1 CM2/M2
ECHO AV AREA/BSA VTI: 1.1 CM2/M2
ECHO AV MEAN GRADIENT: 5 MMHG
ECHO AV MEAN VELOCITY: 1 M/S
ECHO AV PEAK GRADIENT: 9 MMHG
ECHO AV PEAK VELOCITY: 1.5 M/S
ECHO AV VELOCITY RATIO: 0.6
ECHO AV VTI: 33.7 CM
ECHO EST RA PRESSURE: 8 MMHG
ECHO IVC PROX: 1.9 CM
ECHO LA AREA 4C: 13.3 CM2
ECHO LA DIAMETER INDEX: 1.24 CM/M2
ECHO LA DIAMETER: 2.2 CM
ECHO LA MAJOR AXIS: 4.5 CM
ECHO LA TO AORTIC ROOT RATIO: 0.85
ECHO LV E' LATERAL VELOCITY: 9 CM/S
ECHO LV E' SEPTAL VELOCITY: 8 CM/S
ECHO LV FRACTIONAL SHORTENING: 36 % (ref 28–44)
ECHO LV INTERNAL DIMENSION DIASTOLE INDEX: 2.2 CM/M2
ECHO LV INTERNAL DIMENSION DIASTOLIC: 3.9 CM (ref 3.9–5.3)
ECHO LV INTERNAL DIMENSION SYSTOLIC INDEX: 1.41 CM/M2
ECHO LV INTERNAL DIMENSION SYSTOLIC: 2.5 CM
ECHO LV IVSD: 0.9 CM (ref 0.6–0.9)
ECHO LV MASS 2D: 97.4 G (ref 67–162)
ECHO LV MASS INDEX 2D: 55 G/M2 (ref 43–95)
ECHO LV POSTERIOR WALL DIASTOLIC: 0.8 CM (ref 0.6–0.9)
ECHO LV RELATIVE WALL THICKNESS RATIO: 0.41
ECHO LVOT AREA: 2.8 CM2
ECHO LVOT AV VTI INDEX: 0.68
ECHO LVOT DIAM: 1.9 CM
ECHO LVOT MEAN GRADIENT: 2 MMHG
ECHO LVOT PEAK GRADIENT: 3 MMHG
ECHO LVOT PEAK VELOCITY: 0.9 M/S
ECHO LVOT STROKE VOLUME INDEX: 36.5 ML/M2
ECHO LVOT SV: 64.6 ML
ECHO LVOT VTI: 22.8 CM
ECHO MV A VELOCITY: 0.82 M/S
ECHO MV E DECELERATION TIME (DT): 251 MS
ECHO MV E VELOCITY: 1.07 M/S
ECHO MV E/A RATIO: 1.3
ECHO MV E/E' LATERAL: 11.89
ECHO MV E/E' RATIO (AVERAGED): 12.63
ECHO MV E/E' SEPTAL: 13.38
ECHO MV REGURGITANT PEAK GRADIENT: 121 MMHG
ECHO MV REGURGITANT PEAK VELOCITY: 5.5 M/S
ECHO MV REGURGITANT VTIA: 163 CM
ECHO RIGHT VENTRICULAR SYSTOLIC PRESSURE (RVSP): 39 MMHG
ECHO RV BASAL DIMENSION: 3.1 CM
ECHO RV INTERNAL DIMENSION: 2.3 CM
ECHO RV TAPSE: 2.4 CM (ref 1.5–2)
ECHO TV REGURGITANT MAX VELOCITY: 2.78 M/S
ECHO TV REGURGITANT PEAK GRADIENT: 31 MMHG
EOSINOPHIL # BLD: 0.1 K/UL (ref 0–0.8)
EOSINOPHIL NFR BLD: 2 % (ref 0.5–7.8)
ERYTHROCYTE [DISTWIDTH] IN BLOOD BY AUTOMATED COUNT: 13.4 % (ref 11.9–14.6)
GLUCOSE SERPL-MCNC: 80 MG/DL (ref 65–100)
HCT VFR BLD AUTO: 34.2 % (ref 35.8–46.3)
HGB BLD-MCNC: 10.9 G/DL (ref 11.7–15.4)
IMM GRANULOCYTES # BLD AUTO: 0 K/UL (ref 0–0.5)
IMM GRANULOCYTES NFR BLD AUTO: 0 % (ref 0–5)
LYMPHOCYTES # BLD: 0.8 K/UL (ref 0.5–4.6)
LYMPHOCYTES NFR BLD: 18 % (ref 13–44)
MCH RBC QN AUTO: 31.1 PG (ref 26.1–32.9)
MCHC RBC AUTO-ENTMCNC: 31.9 G/DL (ref 31.4–35)
MCV RBC AUTO: 97.7 FL (ref 79.6–97.8)
MONOCYTES # BLD: 1 K/UL (ref 0.1–1.3)
MONOCYTES NFR BLD: 21 % (ref 4–12)
NEUTS SEG # BLD: 2.8 K/UL (ref 1.7–8.2)
NEUTS SEG NFR BLD: 59 % (ref 43–78)
NRBC # BLD: 0 K/UL (ref 0–0.2)
PLATELET # BLD AUTO: 207 K/UL (ref 150–450)
PLATELET COMMENTS,PCOM: ADEQUATE
PMV BLD AUTO: 10 FL (ref 9.4–12.3)
POTASSIUM SERPL-SCNC: 3.9 MMOL/L (ref 3.5–5.1)
RBC # BLD AUTO: 3.5 M/UL (ref 4.05–5.2)
RBC MORPH BLD: ABNORMAL
SODIUM SERPL-SCNC: 138 MMOL/L (ref 136–145)
WBC # BLD AUTO: 4.7 K/UL (ref 4.3–11.1)
WBC MORPH BLD: ABNORMAL

## 2022-03-18 PROCEDURE — 74011250636 HC RX REV CODE- 250/636: Performed by: INTERNAL MEDICINE

## 2022-03-18 PROCEDURE — 74011000250 HC RX REV CODE- 250: Performed by: INTERNAL MEDICINE

## 2022-03-18 PROCEDURE — 74011250636 HC RX REV CODE- 250/636: Performed by: HOSPITALIST

## 2022-03-18 PROCEDURE — 74011250637 HC RX REV CODE- 250/637: Performed by: HOSPITALIST

## 2022-03-18 PROCEDURE — 80048 BASIC METABOLIC PNL TOTAL CA: CPT

## 2022-03-18 PROCEDURE — G0378 HOSPITAL OBSERVATION PER HR: HCPCS

## 2022-03-18 PROCEDURE — 85025 COMPLETE CBC W/AUTO DIFF WBC: CPT

## 2022-03-18 PROCEDURE — 97530 THERAPEUTIC ACTIVITIES: CPT

## 2022-03-18 PROCEDURE — 96372 THER/PROPH/DIAG INJ SC/IM: CPT

## 2022-03-18 PROCEDURE — 93306 TTE W/DOPPLER COMPLETE: CPT

## 2022-03-18 PROCEDURE — 97161 PT EVAL LOW COMPLEX 20 MIN: CPT

## 2022-03-18 PROCEDURE — 36415 COLL VENOUS BLD VENIPUNCTURE: CPT

## 2022-03-18 PROCEDURE — 74011250637 HC RX REV CODE- 250/637: Performed by: INTERNAL MEDICINE

## 2022-03-18 RX ORDER — OSELTAMIVIR PHOSPHATE 75 MG/1
75 CAPSULE ORAL 2 TIMES DAILY
Status: DISCONTINUED | OUTPATIENT
Start: 2022-03-18 | End: 2022-03-18 | Stop reason: HOSPADM

## 2022-03-18 RX ORDER — OSELTAMIVIR PHOSPHATE 75 MG/1
75 CAPSULE ORAL 2 TIMES DAILY
Qty: 9 CAPSULE | Refills: 0 | Status: SHIPPED | OUTPATIENT
Start: 2022-03-18 | End: 2022-03-23

## 2022-03-18 RX ORDER — GUAIFENESIN 100 MG/5ML
100 SOLUTION ORAL
Qty: 236 ML | Refills: 0 | Status: SHIPPED | OUTPATIENT
Start: 2022-03-18 | End: 2022-03-25

## 2022-03-18 RX ADMIN — GUAIFENESIN 100 MG: 200 SOLUTION ORAL at 01:23

## 2022-03-18 RX ADMIN — ENOXAPARIN SODIUM 40 MG: 100 INJECTION SUBCUTANEOUS at 09:54

## 2022-03-18 RX ADMIN — SODIUM CHLORIDE, POTASSIUM CHLORIDE, SODIUM LACTATE AND CALCIUM CHLORIDE 100 ML/HR: 600; 310; 30; 20 INJECTION, SOLUTION INTRAVENOUS at 07:54

## 2022-03-18 RX ADMIN — OSELTAMIVIR PHOSPHATE 75 MG: 75 CAPSULE ORAL at 09:54

## 2022-03-18 RX ADMIN — ACETAMINOPHEN 650 MG: 325 TABLET ORAL at 05:10

## 2022-03-18 RX ADMIN — SODIUM CHLORIDE, PRESERVATIVE FREE 10 ML: 5 INJECTION INTRAVENOUS at 05:12

## 2022-03-18 RX ADMIN — SODIUM CHLORIDE, POTASSIUM CHLORIDE, SODIUM LACTATE AND CALCIUM CHLORIDE 1000 ML: 600; 310; 30; 20 INJECTION, SOLUTION INTRAVENOUS at 09:54

## 2022-03-18 NOTE — PROGRESS NOTES
Reviewed after visit summary, obtained signature and placed paper copy in chart. Discussed changes in medications and provided paper copy of prescriptions. Opportunity for questions and clarification was provided. CNA accompanied to discharge area in wheelchair on room air.

## 2022-03-18 NOTE — PROGRESS NOTES
Physical assessment completed. Patient resting in bed, alert and oriented, cooperative with care. Telemetry in place. IV in place patent and infusing LR at  100 ml/hr. Dual skin assessment completed no skin issues noted at this time. Patient denies pain or distress, safety measures in place, call light within reach.

## 2022-03-18 NOTE — PROGRESS NOTES
MSN, CM:  Spoke with patient this AM about discharge planning. Patient lives alone win own house with a ramp for entrance. Patient has a son Amy Alves" which lives locally and 2 other sons that live out of state. Patient is independent with all ADL's and requires no equipment for ambulation. Patient denies any home oxygen, HH or rehab in the past.  Patient has received outpatient rehab at [facility] r/t back pain. Patient works as a . Patient confirms PCP is Dr. Michael Mojica and patient drives herself to all appointments. PT consulted with recommendations of no needs. Patient will be discharged home today with no services ordered or requested. Patient and family agree with this discharge plan and has met all milestones for this admission. Family to transport patient home. Care Management Interventions  PCP Verified by CM: Yes (Dr. Michael Mojica)  Mode of Transport at Discharge:  Other (see comment) (family to transport)  Health Maintenance Reviewed: Yes  Support Systems: Child(meliza)  Confirm Follow Up Transport: Self  Freedom of Choice List was Provided with Basic Dialogue that Supports the Patient's Individualized Plan of Care/Goals, Treatment Preferences and Shares the Quality Data Associated with the Providers?: Yes  Discharge Location  Patient Expects to be Discharged to[de-identified] Home

## 2022-03-18 NOTE — PROGRESS NOTES
ACUTE PHYSICAL THERAPY GOALS:  (Developed with and agreed upon by patient and/or caregiver. )  LTG:  (1.)Ms. Kinga Lynn will move from supine to sit and sit to supine , scoot up and down and roll side to side in bed with INDEPENDENT within 7 treatment day(s). (2.)Ms. Kinga Lynn will transfer from bed to chair and chair to bed with INDEPENDENT using the least restrictive device within 7 treatment day(s). (3.)Ms. Kinga Lynn will ambulate with SUPERVISION for 400+ feet with the least restrictive device within 7 treatment day(s). ________________________________________________________________________________________________        PHYSICAL THERAPY ASSESSMENT: Initial Assessment and AM PT Treatment Day # 1      Gloria Beyer is a [de-identified] y.o. female   PRIMARY DIAGNOSIS: Syncope  Syncope [R55]  SOB (shortness of breath) [R06.02]       Reason for Referral:    ICD-10: Treatment Diagnosis: Generalized Muscle Weakness (M62.81)  Difficulty in walking, Not elsewhere classified (R26.2)  INPATIENT: Payor: [facility] MEDICARE COMPLETE / Plan: [facility] MEDICARE COMPLETE / Product Type: Managed Care Medicare /     ASSESSMENT:     REHAB RECOMMENDATIONS:   Recommendation to date pending progress:  Setting:   No further skilled therapy after discharge from hospital  Equipment:    None     PRIOR LEVEL OF FUNCTION:  (Prior to Hospitalization) INITIAL/CURRENT LEVEL OF FUNCTION:  (Most Recently Demonstrated)   Bed Mobility:   Independent  Sit to Stand:   Independent  Transfers:   Independent  Gait/Mobility:   Independent Bed Mobility:   Supervision  Sit to Stand:   Supervision  Transfers:   Supervision  Gait/Mobility:  [facility] Assistance     ASSESSMENT:  Ms. Kinga Lynn was supine at arrival finishing procedure. She agreed to participate and enthusiastic about getting to get up. She desired to go to bathroom and was able to do it SBA. Took BP multiple times recorded below. Pt was able to walk with no symptoms.  She returned to room and sat in chair. Pt main limitation is possible low BP, but pt stating she has not had problems in past and not in hospital for low BP, but discovered low BP after taking blood in ER. Will monitor mobility and what BP will allow while in hospital . Pt will benefit from skilled and sophisticated PT to help improve impairments. SUBJECTIVE:   Ms. nKapp Side states, \"can I go to bathroom\"    SOCIAL HISTORY/LIVING ENVIRONMENT: lives in 1 story house with several family members always having at least 1 there at all tomes. She is IND with all ADLs, no AD used, she is very active waling around [place] often during day. Drives, shops for self.    Home Environment: Private residence  # Steps to Enter: 3  One/Two Story Residence: One story  Living Alone: Yes  Support Systems: Child(meliza)  OBJECTIVE:     PAIN: VITAL SIGNS: LINES/DRAINS:   Pre Treatment: Pain Screen  Pain Scale 1: Numeric (0 - 10)  Pain Intensity 1: 0  Post Treatment: 0 Vital Signs  BP: 127/88  MAP (Calculated): 101  BP 1 Location: Right upper arm  BP 1 Method: Automatic  BP Patient Position: Sitting  Additional Blood Pressure/Pulse Data  BP 2: 115/48  MAP 2 (Calculated): 70  BP 2 Location: Right arm  BP Method 2: Automatic  Patient Position 2: Standing  BP 3: 134/54  MAP 3 (Calculated): 81  BP 3 Location: Right arm  BP Method 3: Automatic  Patient Position 3: Post activity None  O2 Device: Nasal cannula     GROSS EVALUATION:   Within Functional Limits Abnormal/ Functional Abnormal/ Non-Functional (see comments) Not Tested Comments:   AROM [x] [] [] []    PROM [] [] [] []    Strength [x] [] [] []    Balance [x] [] [] []    Posture [x] [] [] []    Sensation [x] [] [] []    Coordination [x] [] [] []    Tone [] [] [] []    Edema [] [] [] []    Activity Tolerance [x] [] [] []     [] [] [] []      COGNITION/  PERCEPTION: Intact Impaired   (see comments) Comments:   Orientation [x] []    Vision [x] []    Hearing [x] []    Command Following [x] []    Safety Awareness [x] [] [] []      MOBILITY: I Mod I S SBA CGA Min Mod Max Total  NT x2 Comments:   Bed Mobility    Rolling [] [] [x] [] [] [] [] [] [] [] []    Supine to Sit [] [] [x] [] [] [] [] [] [] [] []    Scooting [] [] [x] [] [] [] [] [] [] [] []    Sit to Supine [] [] [] [] [] [] [] [] [] [] []    Transfers    Sit to Stand [] [] [x] [] [] [] [] [] [] [] []    Bed to Chair [] [] [x] [] [] [] [] [] [] [] []    Stand to Sit [] [] [x] [] [] [] [] [] [] [] []    I=Independent, Mod I=Modified Independent, S=Supervision, SBA=Standby Assistance, CGA=Contact Guard Assistance,   Min=Minimal Assistance, Mod=Moderate Assistance, Max=Maximal Assistance, Total=Total Assistance, NT=Not Tested  GAIT: I Mod I S SBA CGA Min Mod Max Total  NT x2 Comments:   Level of Assistance [] [] [] [x] [] [] [] [] [] [] []    Distance 200    DME None    Gait Quality good    Weightbearing Status N/A     I=Independent, Mod I=Modified Independent, S=Supervision, SBA=Standby Assistance, CGA=Contact Guard Assistance,   Min=Minimal Assistance, Mod=Moderate Assistance, Max=Maximal Assistance, Total=Total Assistance, NT=Not Tested    [address] [facility] [address] [address] AM-PAC 6 Clicks   Basic Mobility Inpatient Short Form       How much difficulty does the patient currently have. .. Unable A Lot A Little None   1. Turning over in bed (including adjusting bedclothes, sheets and blankets)? [] 1   [] 2   [] 3   [x] 4   2. Sitting down on and standing up from a chair with arms ( e.g., wheelchair, bedside commode, etc.)   [] 1   [] 2   [] 3   [x] 4   3. Moving from lying on back to sitting on the side of the bed? [] 1   [] 2   [] 3   [x] 4   How much help from another person does the patient currently need. .. Total A Lot A Little None   4. Moving to and from a bed to a chair (including a wheelchair)? [] 1   [] 2   [] 3   [x] 4   5. Need to walk in hospital room? [] 1   [] 2   [] 3   [x] 4   6. Climbing 3-5 steps with a railing?    [] 1   [] 2   [] 3   [x] 4   © 2007, Trustees of [facility] [employer], under license to [employer]. All rights reserved     Score:  Initial: 24 Most Recent: X (Date: -- )    Interpretation of Tool:  Represents activities that are increasingly more difficult (i.e. Bed mobility, Transfers, Gait). PLAN:   FREQUENCY/DURATION: PT Plan of Care: 3 times/week for duration of hospital stay or until stated goals are met, whichever comes first.    PROBLEM LIST:   (Skilled intervention is medically necessary to address:)  1. Decreased ADL/Functional Activities  2. Decreased Activity Tolerance  3. Decreased AROM/PROM  4. Decreased Balance  5. Decreased Gait Ability  6. Decreased Strength  7. Decreased Transfer Abilities   INTERVENTIONS PLANNED:   (Benefits and precautions of physical therapy have been discussed with the patient.)  1. Therapeutic Activity  2. Therapeutic Exercise/HEP  3. Neuromuscular Re-education  4. Gait Training  5. Education     TREATMENT:     EVALUATION: Low Complexity : (Untimed Charge)    TREATMENT:   ($$ Therapeutic Activity: 8-22 mins    )  Therapeutic Activity (11 Minutes): Therapeutic activity included Rolling, Supine to Sit, Scooting, Lateral Scooting, Transfer Training, Ambulation on level ground, Sitting balance  and Standing balance to improve functional Mobility, Strength and Activity tolerance.     TREATMENT GRID:  N/A    AFTER TREATMENT POSITION/PRECAUTIONS:  Chair, Needs within reach, RN notified and Visitors at bedside    INTERDISCIPLINARY COLLABORATION:  RN/PCT and PT/PTA    TOTAL TREATMENT DURATION:  PT Patient Time In/Time Out  Time In: 0950  Time Out: [address], DPT

## 2022-03-18 NOTE — PROGRESS NOTES
Patient temperature above normal range. Orders received to monitor this patient. Charge nurse notified.

## 2022-03-18 NOTE — DISCHARGE INSTRUCTIONS
Patient Education        Influenza (Flu): Care Instructions  Overview     Influenza (flu) is an infection in the lungs and breathing passages. It is caused by the influenza virus. There are different strains, or types, of the flu virus from year to year. Unlike the common cold, the flu comes on suddenly and the symptoms can be more severe. These symptoms include a cough, congestion, fever, chills, fatigue, aches, and pains. These symptoms may last up to 10 days. Although the flu can make you feel very sick, it usually doesn't cause serious health problems. Home treatment is usually all you need for flu symptoms. But your doctor may prescribe antiviral medicine to prevent other health problems, such as pneumonia, from developing. The risk of other health problems from the flu is highest for young children (under 2), older adults (over 72), pregnant women, and people with long-term health conditions. Follow-up care is a key part of your treatment and safety. Be sure to make and go to all appointments, and call your doctor if you are having problems. It's also a good idea to know your test results and keep a list of the medicines you take. How can you care for yourself at home? · Get plenty of rest.  · Drink plenty of fluids. If you have to limit fluids because of a health problem, talk with your doctor before you increase the amount of fluids you drink. · Take an over-the-counter pain medicine if needed, such as acetaminophen (Tylenol), ibuprofen (Advil, Motrin), or naproxen (Aleve), to relieve fever, headache, and muscle aches. Be safe with medicines. Read and follow all instructions on the label. · No one younger than 20 should take aspirin. It has been linked to Reye syndrome, a serious illness. · Take any prescribed medicine exactly as directed. · Do not smoke. Smoking can make the flu worse. If you need help quitting, talk to your doctor about stop-smoking programs and medicines.  These can increase your chances of quitting for good. · If the skin around your nose and lips becomes sore, put some petroleum jelly (such as Vaseline) on the area. · To ease coughing:  ? Suck on cough drops or plain, hard candy. ? Try an over-the-counter cough or cold medicine. Read and follow all instructions on the label. ? Raise your head at night with an extra pillow. This may help you rest if coughing keeps you awake. To avoid spreading the flu  · Wash your hands regularly, and keep your hands away from your face. · Stay home from school, work, and other public places until you are feeling better and your fever has been gone for at least 24 hours. The fever needs to have gone away on its own without the help of medicine. · Ask people living with you to talk to their doctors about preventing the flu. They may get antiviral medicine to keep from getting the flu from you. · To prevent the flu in the future, get the flu vaccine every fall. Encourage people living with you to get the vaccine. · Cover your mouth when you cough or sneeze. If you can, cough or sneeze into the bend of your elbow, not your hands. When should you call for help? Call 911 anytime you think you may need emergency care. For example, call if:    · You have severe trouble breathing.     · You have a seizure. Call your doctor now or seek immediate medical care if:    · You have new or worse trouble breathing.     · You have pain or pressure in your chest or belly.     · You have a fever or cough that returns after getting better.     · You feel very sleepy, dizzy, or confused.     · You are not urinating.     · You have severe muscle pain.     · You have severe weakness, or you are unsteady.     · You have medical conditions that are getting worse. Watch closely for changes in your health, and be sure to contact your doctor if:    · You do not get better as expected.     · You are having a problem with your medicine. Where can you learn more?   Go to http://www.gray.com/  Enter R299 in the search box to learn more about \"Influenza (Flu): Care Instructions. \"  Current as of: July 6, 2021               Content Version: 13.2  © 2006-2022 [employer]. Care instructions adapted under license by [employer] (which disclaims liability or warranty for this information). If you have questions about a medical condition or this instruction, always ask your healthcare professional. [place] [address] any warranty or liability for your use of this information. Patient Education        Low Blood Pressure: Care Instructions  Your Care Instructions     Blood pressure is a measurement of the force of the blood against the walls of the blood vessels during and after each beat of the heart. Low blood pressure is also called hypotension. It means that your blood pressure is much lower than normal. Some people, especially young, slim women, may have slightly low blood pressure without symptoms. But in many people, low blood pressure can cause symptoms such as feeling dizzy or lightheaded. When your blood pressure is too low, your heart, brain, and other organs do not get enough blood. Low blood pressure can be caused by many things, including heart problems and some medicines. Diabetes that is not under control can cause your blood pressure to drop. And so can a severe allergic reaction or infection. Another cause is dehydration, which is when your body loses too much fluid. Treatment for low blood pressure depends on the cause. Follow-up care is a key part of your treatment and safety. Be sure to make and go to all appointments, and call your doctor if you are having problems. It's also a good idea to know your test results and keep a list of the medicines you take. How can you care for yourself at home? · Be safe with medicines. Call your doctor if you think you are having a problem with your medicine. You will get more details on the specific medicines your doctor prescribes. · If you feel dizzy or lightheaded, sit down or lie down for a few minutes. Or you can sit down and put your head between your knees. This will help your blood pressure go back to normal and help your symptoms go away. · Follow your doctor's suggestions for ways to prevent symptoms like dizziness. These suggestions may include:  ? Get up slowly from bed or after sitting for a long time. If you are in bed, roll to your side and swing your legs over the edge of the bed and onto the floor. Push your body up to a sitting position. Wait for a while before you slowly stand up.  ? Add more salt to your diet, if your doctor recommends it. ? Drink plenty of fluids. Choose water and other clear liquids. If you have kidney, heart, or liver disease and have to limit fluids, talk with your doctor before you increase the amount of fluids you drink. ? Avoid or limit alcohol to 2 drinks a day for men and 1 drink a day for women. Alcohol may interfere with your medicine. In addition, alcohol can make your low blood pressure worse by causing your body to lose water. ? Wear compression stockings to help improve blood flow. When should you call for help? Call 911 anytime you think you may need emergency care. For example, call if:    · You passed out (lost consciousness). Call your doctor now or seek immediate medical care if:    · You are dizzy or lightheaded, or you feel like you may faint. Watch closely for changes in your health, and be sure to contact your doctor if you have any problems. Where can you learn more? Go to http://www.gray.com/  Enter C304 in the search box to learn more about \"Low Blood Pressure: Care Instructions. \"  Current as of: January 10, 2022               Content Version: 13.2  © 2006-2022 [employer].    Care instructions adapted under license by [employer] (which disclaims liability or warranty for this information). If you have questions about a medical condition or this instruction, always ask your healthcare professional. [address] any warranty or liability for your use of this information.

## 2022-03-18 NOTE — DISCHARGE SUMMARY
Hospitalist Discharge Summary   Admit Date:  3/17/2022 12:42 PM   DC Note date: 3/18/2022  Name:  Rosemary Guzmán   Age:  [de-identified] y.o. Sex:  female  :  1940   MRN:  [id number]   Room:  [facility]  PCP:  Satish Tanner MD    Presenting Complaint: Generalized Body Aches    Initial Admission Diagnosis: Syncope [R55]  SOB (shortness of breath) [R06.02]     Problem List for this Hospitalization:  Hospital Problems as of 3/18/2022 Date Reviewed: 2021          Codes Class Noted - Resolved POA    Influenza ICD-10-CM: J11.1  ICD-9-CM: 487.1  3/18/2022 - Present Unknown        * (Principal) Syncope ICD-10-CM: R55  ICD-9-CM: 780.2  3/17/2022 - Present Unknown        SOB (shortness of breath) ICD-10-CM: R06.02  ICD-9-CM: 786.05  3/17/2022 - Present Unknown            Did Patient have Sepsis (YES OR NO): NO    Syncopal episode likely secondary to orthostatic hypotension  Influenza A present on admission  Mild hyponatremia present on admission resolved    Hospital Course: This is a 81-year-old female with past medical history significant for irritable bowel syndrome presented to the ER because of cough weakness and syncopal episode. Patient has been having dry cough, generalized weakness. Orthostatic vital signs were checked on admission and positive. Patient was hydrated with IV fluids. Repeat orthostatic vital signs were done prior to discharge and were negative. Hyponatremia resolved with IV fluids. Rapid Covid test on admission negative. Respiratory viral panel was done and was positive for influenza A. Patient was started on Tamiflu as symptoms have been present for only 72 hours prior to admission. Echocardiogram was done and showed preserved left ventricle ejection fraction with indeterminate diastolic function. She is feeling well, and was able to ambulate with physical therapy. No complaints of dizziness or shortness of breath.   She was discharged home today in a stable condition to follow-up with primary care physician in 3 to 5 days. Disposition: Home or Self Care  Diet: ADULT DIET Regular  Code Status: Full Code    Follow Up Orders: Follow-up Appointments   Procedures    FOLLOW UP VISIT Appointment in: 3 - 5 Days F/U WITH PCP in 3-5 days     F/U WITH PCP in 3-5 days     Standing Status:   Standing     Number of Occurrences:   1     Order Specific Question:   Appointment in     Answer:   3 - 5 Days       Follow-up Information     Follow up With Specialties Details Why Contact Info    Moe Lepe MD Family Medicine   [address]  [phone]            Follow up labs/diagnostics (ultimately defer to outpatient provider):  CBC, BMP in 3-5 days. Time spent in patient discharge and coordination 39 minutes. Plan was discussed with the pt. All questions answered. Patient was stable at time of discharge. Instructions given to call a physician or return if any concerns. Discharge Info:   Current Discharge Medication List      START taking these medications    Details   guaiFENesin (ROBITUSSIN) 100 mg/5 mL liquid Take 5 mL by mouth every four (4) hours as needed for Cough for up to 7 days. Qty: 236 mL, Refills: 0  Start date: 3/18/2022, End date: 3/25/2022      oseltamivir (TAMIFLU) 75 mg capsule Take 1 Capsule by mouth two (2) times a day for 9 doses. Qty: 9 Capsule, Refills: 0  Start date: 3/18/2022, End date: 3/23/2022         CONTINUE these medications which have NOT CHANGED    Details   L.acid/L.casei/B.bif/B.cherry/FOS (PROBIOTIC BLEND PO) Take  by mouth daily. multivitamin (ONE A DAY) tablet Take 1 Tablet by mouth daily. sucralfate (CARAFATE) 1 gram tablet TAKE 1 TABLET BY MOUTH FOUR TIMES DAILY  Qty: 368 Tab, Refills: 1    Comments: **Patient requests 90 days supply**      polyethylene glycol 3350 (MIRALAX PO) Take  by mouth as needed.          STOP taking these medications       linaCLOtide (Linzess) 290 mcg cap capsule Comments:   Reason for Stopping:               Procedures done this admission:  * No surgery found *    Consults this admission:  None    Echocardiogram/EKG results:  Results from Hospital Encounter encounter on 03/17/22    ECHO ADULT COMPLETE    Interpretation Summary    Left Ventricle: Left ventricle size is normal. Normal wall thickness. Normal wall motion. Normal left ventricular systolic function with a visually estimated EF of 55 - 60%.   Mitral Valve: Mild transvalvular regurgitation with a posterior directed jet.   Technical qualifiers: Echo study was technically difficult. EKG Results     Procedure 720 Value Units Date/Time    EKG, 12 LEAD, INITIAL [[id number]] Collected: 03/17/22 1323    Order Status: Completed Updated: 03/17/22 1457     Ventricular Rate 56 BPM      Atrial Rate 55 BPM      P-R Interval 188 ms      QRS Duration 100 ms      Q-T Interval 451 ms      QTC Calculation (Bezet) 436 ms      Calculated P Axis 91 degrees      Calculated R Axis 90 degrees      Calculated T Axis 70 degrees      Diagnosis --     Sinus rhythm  Borderline right axis deviation    Confirmed by Amparo Gudino MD (), Leonardo Villar ([id number]) on 3/17/2022 2:57:05 PM            Diagnostic Imaging/Tests:   XR CHEST SNGL V    Result Date: 3/17/2022  CHEST X-RAY, 1 VIEW INDICATION: Cough COMPARISON: None available TECHNIQUE: Single AP view of the chest was obtained. FINDINGS: Lungs: Normal. Cardiomediastinal silhouette: Normal. Pleura: No pneumothorax or pleural effusion. Osseous structures: Normal.     No radiographic evidence of acute cardiopulmonary disease. ECHO ADULT COMPLETE    Result Date: 3/18/2022    Left Ventricle: Left ventricle size is normal. Normal wall thickness. Normal wall motion. Normal left ventricular systolic function with a visually estimated EF of 55 - 60%.   Mitral Valve: Mild transvalvular regurgitation with a posterior directed jet.   Technical qualifiers: Echo study was technically difficult.        All Micro Results Procedure Component Value Units Date/Time    BLOOD CULTURE [[id number]] Collected: 03/17/22 1338    Order Status: Completed Specimen: Blood Updated: 03/18/22 0708     Special Requests: --        LEFT  FOREARM       Culture result: NO GROWTH AFTER 17 HOURS       BLOOD CULTURE [[id number]] Collected: 03/17/22 1411    Order Status: Completed Specimen: Blood Updated: 03/18/22 0708     Special Requests: --        RIGHT  FOREARM       Culture result: NO GROWTH AFTER 16 HOURS       RESPIRATORY VIRUS PANEL W/COVID-19, PCR [[id number]]  (Abnormal) Collected: 03/17/22 1737    Order Status: Completed Specimen: Nasopharyngeal Updated: 03/17/22 1842     Adenovirus NOT DETECTED        Coronavirus 229E NOT DETECTED        Coronavirus HKU1 NOT DETECTED        Coronavirus CVNL63 NOT DETECTED        Coronavirus OC43 NOT DETECTED        SARS-CoV-2, PCR NOT DETECTED        Metapneumovirus NOT DETECTED        Rhinovirus and Enterovirus NOT DETECTED        Influenza A, subtype H3 Detected        Influenza B NOT DETECTED        Parainfluenza 1 NOT DETECTED        Parainfluenza 2 NOT DETECTED        Parainfluenza 3 NOT DETECTED        Parainfluenza virus 4 NOT DETECTED        RSV by PCR NOT DETECTED        B. parapertussis, PCR NOT DETECTED        Bordetella pertussis - PCR NOT DETECTED        Chlamydophila pneumoniae DNA, QL, PCR NOT DETECTED        Mycoplasma pneumoniae DNA, QL, PCR NOT DETECTED       COVID-19 RAPID TEST [[id number]] Collected: 03/17/22 1241    Order Status: Completed Specimen: Nasopharyngeal Updated: 03/17/22 1315     Specimen source NASAL        COVID-19 rapid test Not detected        Comment:      The specimen is NEGATIVE for SARS-CoV-2, the novel coronavirus associated with COVID-19. A negative result does not rule out COVID-19. This test has been authorized by the FDA under an Emergency Use Authorization (EUA) for use by authorized laboratories.         Fact sheet for Healthcare Providers: ConventionUpdate.co.nz  Fact sheet for Patients: ConventionUpdate.co.nz       Methodology: Isothermal Nucleic Acid Amplification               Labs: Results:       BMP, Mg, Phos Recent Labs     03/18/22  0355 03/17/22  1322    133*   K 3.9 4.1   * 101   CO2 26 30   AGAP 4* 2*   BUN 10 11   CREA 0.70 0.80   CA 8.2* 8.9   GLU 80 93   MG  --  2.2      CBC Recent Labs     03/18/22  0355 03/17/22  1322   WBC 4.7 5.3   RBC 3.50* 3.87*   HGB 10.9* 12.2   HCT 34.2* 37.1    231   GRANS 59 66   LYMPH 18 16   EOS 2 1   MONOS 21* 17*   BASOS 0 0   IG 0 0   ANEU 2.8 3.5   ABL 0.8 0.8   DENY 0.1 0.0   ABM 1.0 0.9   ABB 0.0 0.0   AIG 0.0 0.0      LFT Recent Labs     03/17/22  1322   ALT 42   AP 83   TP 7.0   ALB 3.4   GLOB 3.6*   AGRAT 0.9*      Cardiac Testing No results found for: BNPP, BNP, CPK, RCK1, RCK2, RCK3, RCK4, CKMB, CKNDX, CKND1, TROPT, TROIQ   Coagulation Tests No results found for: PTP, INR, APTT, INREXT, INREXT   A1c No results found for: HBA1C, JLF4MDFP, GBE7FSTZ   Lipid Panel No results found for: CHOL, CHOLPOCT, CHOLX, CHLST, CHOLV, 799733, HDL, HDLP, LDL, LDLC, DLDLP, 100762, VLDLC, VLDL, TGLX, TRIGL, TRIGP, TGLPOCT, CH[facility], [facility]   Thyroid Panel Lab Results   Component Value Date/Time    TSH 2.340 07/23/2020 11:10 AM    TSH 2.190 04/03/2018 11:07 AM        Most Recent UA Lab Results   Component Value Date/Time    Color YELLOW 03/17/2022 03:12 PM    Appearance CLEAR 03/17/2022 03:12 PM    pH (UA) 7.0 03/17/2022 03:12 PM    Protein Negative 03/17/2022 03:12 PM    Glucose Negative 03/17/2022 03:12 PM    Ketone TRACE (A) 03/17/2022 03:12 PM    Bilirubin Negative 03/17/2022 03:12 PM    Blood Negative 03/17/2022 03:12 PM    Urobilinogen 0.2 03/17/2022 03:12 PM    Nitrites Negative 03/17/2022 03:12 PM    Leukocyte Esterase Negative 03/17/2022 03:12 PM          All Labs from Last 24 Hrs:  Recent Results (from the past 24 hour(s))   EKG, 12 LEAD, INITIAL Collection Time: 03/17/22  1:23 PM   Result Value Ref Range    Ventricular Rate 56 BPM    Atrial Rate 55 BPM    P-R Interval 188 ms    QRS Duration 100 ms    Q-T Interval 451 ms    QTC Calculation (Bezet) 436 ms    Calculated P Axis 91 degrees    Calculated R Axis 90 degrees    Calculated T Axis 70 degrees    Diagnosis       Sinus rhythm  Borderline right axis deviation    Confirmed by Natanael Sandhu MD (), VY HURTADO ([id number]) on 3/17/2022 2:57:05 PM     TYPE & SCREEN    Collection Time: 03/17/22  1:25 PM   Result Value Ref Range    Crossmatch Expiration 03/20/2022,2359     ABO/Rh(D) [employer] POSITIVE     Antibody screen NEG    CULTURE, BLOOD    Collection Time: 03/17/22  1:38 PM    Specimen: Blood   Result Value Ref Range    Special Requests: LEFT  FOREARM        Culture result: NO GROWTH AFTER 17 HOURS     CULTURE, BLOOD    Collection Time: 03/17/22  2:11 PM    Specimen: Blood   Result Value Ref Range    Special Requests: RIGHT  FOREARM        Culture result: NO GROWTH AFTER 16 HOURS     LACTIC ACID    Collection Time: 03/17/22  2:11 PM   Result Value Ref Range    Lactic acid 1.2 0.4 - 2.0 MMOL/L   URINALYSIS W/ RFLX MICROSCOPIC    Collection Time: 03/17/22  3:12 PM   Result Value Ref Range    Color YELLOW      Appearance CLEAR      Specific gravity 1.005 1.001 - 1.023      pH (UA) 7.0 5.0 - 9.0      Protein Negative NEG mg/dL    Glucose Negative mg/dL    Ketone TRACE (A) NEG mg/dL    Bilirubin Negative NEG      Blood Negative NEG      Urobilinogen 0.2 0.2 - 1.0 EU/dL    Nitrites Negative NEG      Leukocyte Esterase Negative NEG     RESPIRATORY VIRUS PANEL W/COVID-19, PCR    Collection Time: 03/17/22  5:37 PM    Specimen: Nasopharyngeal   Result Value Ref Range    Adenovirus NOT DETECTED NOTDET      Coronavirus 229E NOT DETECTED NOTDET      Coronavirus HKU1 NOT DETECTED NOTDET      Coronavirus CVNL63 NOT DETECTED NOTDET      Coronavirus OC43 NOT DETECTED NOTDET      SARS-CoV-2, PCR NOT DETECTED NOTDET      Metapneumovirus NOT DETECTED NOTDET      Rhinovirus and Enterovirus NOT DETECTED NOTDET      Influenza A, subtype H3 Detected (A) NOTDET      Influenza B NOT DETECTED NOTDET      Parainfluenza 1 NOT DETECTED NOTDET      Parainfluenza 2 NOT DETECTED NOTDET      Parainfluenza 3 NOT DETECTED NOTDET      Parainfluenza virus 4 NOT DETECTED NOTDET      RSV by PCR NOT DETECTED NOTDET      B. parapertussis, PCR NOT DETECTED NOTDET      Bordetella pertussis - PCR NOT DETECTED NOTDET      Chlamydophila pneumoniae DNA, QL, PCR NOT DETECTED NOTDET      Mycoplasma pneumoniae DNA, QL, PCR NOT DETECTED NOTDET     CBC WITH AUTOMATED DIFF    Collection Time: 03/18/22  3:55 AM   Result Value Ref Range    WBC 4.7 4.3 - 11.1 K/uL    RBC 3.50 (L) 4.05 - 5.2 M/uL    HGB 10.9 (L) 11.7 - 15.4 g/dL    HCT 34.2 (L) 35.8 - 46.3 %    MCV 97.7 79.6 - 97.8 FL    MCH 31.1 26.1 - 32.9 PG    MCHC 31.9 31.4 - 35.0 g/dL    RDW 13.4 11.9 - 14.6 %    PLATELET 592 002 - 289 K/uL    MPV 10.0 9.4 - 12.3 FL    ABSOLUTE NRBC 0.00 0.0 - 0.2 K/uL    NEUTROPHILS 59 43 - 78 %    LYMPHOCYTES 18 13 - 44 %    MONOCYTES 21 (H) 4.0 - 12.0 %    EOSINOPHILS 2 0.5 - 7.8 %    BASOPHILS 0 0.0 - 2.0 %    IMMATURE GRANULOCYTES 0 0.0 - 5.0 %    ABS. NEUTROPHILS 2.8 1.7 - 8.2 K/UL    ABS. LYMPHOCYTES 0.8 0.5 - 4.6 K/UL    ABS. MONOCYTES 1.0 0.1 - 1.3 K/UL    ABS. EOSINOPHILS 0.1 0.0 - 0.8 K/UL    ABS. BASOPHILS 0.0 0.0 - 0.2 K/UL    ABS. IMM.  GRANS. 0.0 0.0 - 0.5 K/UL    RBC COMMENTS OCCASIONAL  SCHISTOCYTES        WBC COMMENTS Result Confirmed By Smear      PLATELET COMMENTS ADEQUATE      DF AUTOMATED     METABOLIC PANEL, BASIC    Collection Time: 03/18/22  3:55 AM   Result Value Ref Range    Sodium 138 136 - 145 mmol/L    Potassium 3.9 3.5 - 5.1 mmol/L    Chloride 108 (H) 98 - 107 mmol/L    CO2 26 21 - 32 mmol/L    Anion gap 4 (L) 7 - 16 mmol/L    Glucose 80 65 - 100 mg/dL    BUN 10 8 - 23 MG/DL    Creatinine 0.70 0.6 - 1.0 MG/DL    GFR est AA >60 >60 ml/min/1.73m2    GFR est non-AA >60 >60 ml/min/1.73m2    Calcium 8.2 (L) 8.3 - 10.4 MG/DL   ECHO ADULT COMPLETE    Collection Time: 03/18/22 10:12 AM   Result Value Ref Range    LA Major [place] 4.5 cm    LA Area 4C 13.3 cm2    LA Diameter 2.2 cm    AV Mean Velocity 1.0 m/s    AV Mean Gradient 5 mmHg    AV VTI 33.7 cm    AV Peak Velocity 1.5 m/s    AV Peak Gradient 9 mmHg    AV Area by VTI 1.9 cm2    AV Area by Peak Velocity 1.7 cm2    Aortic Root 2.6 cm    Ascending Aorta 2.8 cm    IVC Proxmal 1.9 cm    IVSd 0.9 0.6 - 0.9 cm    LVIDd 3.9 3.9 - 5.3 cm    LVIDs 2.5 cm    LVOT Diameter 1.9 cm    LVOT Mean Gradient 2 mmHg    LVOT VTI 22.8 cm    LVOT Peak Velocity 0.9 m/s    LVOT Peak Gradient 3 mmHg    LVPWd 0.8 0.6 - 0.9 cm    LV E' Lateral Velocity 9 cm/s    LV E' Septal Velocity 8 cm/s    LVOT Area 2.8 cm2    LVOT SV 64.6 ml    MR .0 cm    MR Peak Velocity 5.5 m/s    MR Peak Gradient 121 mmHg    MV E Wave Deceleration Time 251.0 ms    MV A Velocity 0.82 m/s    MV E Velocity 1.07 m/s    RVIDd 2.3 cm    RV Basal Dimension 3.1 cm    TAPSE 2.4 (A) 1.5 - 2.0 cm    TR Max Velocity 2.78 m/s    TR Peak Gradient 31 mmHg    Fractional Shortening 2D 36 28 - 44 %    LVIDd Index 2.20 cm/m2    LVIDs Index 1.41 cm/m2    LV RWT Ratio 0.41     LV Mass 2D 97.4 67 - 162 g    LV Mass 2D Index 55.0 43 - 95 g/m2    MV E/A 1.30     E/E' Ratio (Averaged) 12.63     E/E' Lateral 11.89     E/E' Septal 13.38     LVOT Stroke Volume Index 36.5 mL/m2    LA Size Index 1.24 cm/m2    LA/AO Root Ratio 0.85     Ao Root Index 1.47 cm/m2    Ascending Aorta Index 1.58 cm/m2    AV Velocity Ratio 0.60     LVOT:AV VTI Index 0.68     JAMIR/BSA VTI 1.1 cm2/m2    JAMIR/BSA Peak Velocity 1.0 cm2/m2    Est. RA Pressure 8 mmHg    RVSP 39 mmHg       Current Med List in Hospital:   Current Facility-Administered Medications   Medication Dose Route Frequency    oseltamivir (TAMIFLU) capsule 75 mg  75 mg Oral BID    sodium chloride (NS) flush 5-40 mL  5-40 mL IntraVENous Q8H    sodium chloride (NS) flush 5-40 mL  5-40 mL IntraVENous PRN    acetaminophen (TYLENOL) tablet 650 mg  650 mg Oral Q6H PRN    Or    acetaminophen (TYLENOL) suppository 650 mg  650 mg Rectal Q6H PRN    polyethylene glycol (MIRALAX) packet 17 g  17 g Oral DAILY PRN    ondansetron (ZOFRAN ODT) tablet 4 mg  4 mg Oral Q8H PRN    Or    ondansetron (ZOFRAN) injection 4 mg  4 mg IntraVENous Q6H PRN    enoxaparin (LOVENOX) injection 40 mg  40 mg SubCUTAneous DAILY    albuterol (PROVENTIL VENTOLIN) nebulizer solution 2.5 mg  2.5 mg Nebulization Q6H PRN    guaiFENesin (ROBITUSSIN) 100 mg/5 mL oral liquid 100 mg  100 mg Oral Q4H PRN       Allergies   Allergen Reactions    Prevnar 13 [Pneumoc 13-Leah Conj-Dip Cr(Pf)] Swelling    Percocet [Oxycodone-Acetaminophen] Nausea and Vomiting    Septra [Sulfamethoprim] Hives     Immunization History   Administered Date(s) Administered    COVID-19, Pfizer Purple top, DILUTE for use, 12+ yrs, 30mcg/0.3mL dose 01/20/2021, 02/12/2021    Influenza High Dose Vaccine PF 10/01/2018, 10/10/2019    Influenza Vaccine (Quad) Mdck Pf (>2 Yrs Flucelvax QUAD [id number]) 10/01/2019    Influenza Vaccine (Tri) Adjuvanted (>65 Yrs FLUAD TRI [id number]) 10/11/2018    Pneumococcal Conjugate (PCV-13) 04/05/2016, 05/12/2016    Pneumococcal Polysaccharide (PPSV-23) 05/03/2018    Tdap 07/22/2017    Zoster Recombinant 04/02/2018, 08/29/2018    Zoster Vaccine, Live 04/03/2018       Recent Vital Data:  Patient Vitals for the past 24 hrs:   Temp Pulse Resp BP SpO2   03/18/22 1218  62  (!) 146/53    03/18/22 1217  60  (!) 142/56    03/18/22 1216  (!) 57  (!) 134/55    03/18/22 1110 98.4 °F (36.9 °C) (!) 57 16 (!) 120/54 100 %   03/18/22 1030    127/88    03/18/22 0734  62  (!) 112/49    03/18/22 0732  61  127/61    03/18/22 0730 98.2 °F (36.8 °C) 62 16 (!) 134/58 95 %   03/18/22 0430 99 °F (37.2 °C) 60 18 (!) 121/50 97 %   03/18/22 0427 97.7 °F (36.5 °C)  18     03/17/22 2351 99 °F (37.2 °C) (!) 57 18 (!) 128/54 99 %   03/17/22 2058 (!) 100.5 °F (38.1 °C) (!) 53 18 (!) 115/55 96 %   03/17/22 1921  64 16 (!) 133/58 99 %   03/17/22 1851  65 18 (!) 141/55 91 %   03/17/22 1806  67 19 132/67 97 %   03/17/22 1736  68 17 (!) 125/52    03/17/22 1706  71 30 116/61 100 %   03/17/22 1637  65 13 (!) 127/54 98 %   03/17/22 1607  70 17 130/60 96 %   03/17/22 1537  70 21 132/61 96 %   03/17/22 1513  76 20 (!) 132/58 97 %   03/17/22 1444  67 17 128/60 99 %   03/17/22 1431  67 18     03/17/22 1425  74 17 (!) 85/72 97 %   03/17/22 1424  67 22 (!) 120/54 99 %     Oxygen Therapy  O2 Sat (%): 100 % (03/18/22 1110)  Pulse via Oximetry: 62 beats per minute (03/17/22 1921)  O2 Device: Nasal cannula (03/17/22 2058)  O2 Flow Rate (L/min): 2 l/min (03/17/22 2058)    Estimated body mass index is 22.91 kg/m² as calculated from the following:    Height as of this encounter: 5' 7\" (1.702 m). Weight as of this encounter: 66.4 kg (146 lb 4.8 oz). Intake/Output Summary (Last 24 hours) at 3/18/2022 1323  Last data filed at 3/18/2022 0931  Gross per 24 hour   Intake 3160 ml   Output 300 ml   Net 2860 ml         Physical Exam:    General:    Elderly female, No overt distress  Head:  Normocephalic, atraumatic  Eyes:  Sclerae appear normal.  Pupils equally round. HENT:  Nares appear normal, no drainage. Moist mucous membranes  Neck:  No restricted ROM. Trachea midline  CV:   RRR. No m/r/g. No JVD  Lungs:   CTAB. No wheezing, rhonchi, or rales. Even, unlabored  Abdomen:   Soft, nontender, nondistended. Extremities: Warm and dry. No cyanosis or clubbing. No edema. Skin:     No rashes. Normal coloration  Neuro:  CN II-XII grossly intact. Psych:  Normal mood and affect. Signed:  Jacobo Manrique MD    Part of this note may have been written by using a voice dictation software. The note has been proof read but may still contain some grammatical/other typographical errors.

## 2022-03-19 PROBLEM — L03.011 PARONYCHIA OF FINGER OF RIGHT HAND: Status: ACTIVE | Noted: 2018-08-30

## 2022-03-19 PROBLEM — I73.00 RAYNAUD'S DISEASE WITHOUT GANGRENE: Status: ACTIVE | Noted: 2018-04-03

## 2022-03-19 PROBLEM — K25.3 ACUTE GASTRIC ULCER WITHOUT HEMORRHAGE OR PERFORATION: Status: ACTIVE | Noted: 2021-04-16

## 2022-03-19 PROBLEM — M85.852 OSTEOPENIA OF NECK OF LEFT FEMUR: Status: ACTIVE | Noted: 2019-08-23

## 2022-03-19 PROBLEM — K02.9 DENTAL CARIES: Status: ACTIVE | Noted: 2020-07-23

## 2022-03-19 PROBLEM — K58.2 IRRITABLE BOWEL SYNDROME WITH BOTH CONSTIPATION AND DIARRHEA: Status: ACTIVE | Noted: 2021-01-05

## 2022-03-19 PROBLEM — J01.00 ACUTE NON-RECURRENT MAXILLARY SINUSITIS: Status: ACTIVE | Noted: 2020-02-10

## 2022-03-19 PROBLEM — G89.29 CHRONIC NECK PAIN: Status: ACTIVE | Noted: 2018-04-03

## 2022-03-19 PROBLEM — M54.2 CHRONIC NECK PAIN: Status: ACTIVE | Noted: 2018-04-03

## 2022-03-19 PROBLEM — Z51.81 ENCOUNTER FOR MONITORING CHRONIC NSAID THERAPY: Status: ACTIVE | Noted: 2018-04-03

## 2022-03-19 PROBLEM — R53.83 CAREGIVER WITH FATIGUE: Status: ACTIVE | Noted: 2020-07-23

## 2022-03-19 PROBLEM — N18.31 STAGE 3A CHRONIC KIDNEY DISEASE (HCC): Status: ACTIVE | Noted: 2021-04-16

## 2022-03-19 PROBLEM — F43.21 GRIEF: Status: ACTIVE | Noted: 2021-01-05

## 2022-03-19 PROBLEM — H65.03 NON-RECURRENT ACUTE SEROUS OTITIS MEDIA OF BOTH EARS: Status: ACTIVE | Noted: 2020-02-10

## 2022-03-19 PROBLEM — Z79.1 ENCOUNTER FOR MONITORING CHRONIC NSAID THERAPY: Status: ACTIVE | Noted: 2018-04-03

## 2022-03-19 LAB
ACC. NO. FROM MICRO ORDER, ACCP: ABNORMAL
BACTERIA SPEC CULT: ABNORMAL
BACTERIA SPEC CULT: ABNORMAL
GRAM STN SPEC: ABNORMAL
INTERPRETATION: ABNORMAL
MECA (METHICILLIN-RESISTANCE GENES), MRGP: NOT DETECTED
SERVICE CMNT-IMP: ABNORMAL
STAPHYLOCOCCUS, STAPP: DETECTED

## 2022-03-20 PROBLEM — R53.82 CHRONIC FATIGUE: Status: ACTIVE | Noted: 2018-04-03

## 2022-03-20 PROBLEM — Z20.820 EXPOSURE TO VARICELLA ZOSTER VIRUS (VZV): Status: ACTIVE | Noted: 2018-04-03

## 2022-03-20 PROBLEM — N80.9 ENDOMETRIOSIS: Status: ACTIVE | Noted: 2020-08-17

## 2022-03-22 LAB
BACTERIA SPEC CULT: NORMAL
SERVICE CMNT-IMP: NORMAL

## 2022-03-24 PROBLEM — J11.1 INFLUENZA: Status: ACTIVE | Noted: 2022-03-18

## 2022-03-24 PROBLEM — R06.02 SOB (SHORTNESS OF BREATH): Status: ACTIVE | Noted: 2022-03-17

## 2022-03-24 PROBLEM — R55 SYNCOPE: Status: ACTIVE | Noted: 2022-03-17

## 2022-04-01 PROBLEM — E86.0 DEHYDRATION: Status: ACTIVE | Noted: 2022-04-01

## 2022-04-01 PROBLEM — J10.1 INFLUENZA A: Status: ACTIVE | Noted: 2022-03-18

## 2022-04-29 PROBLEM — Z00.00 ENCOUNTER FOR MEDICARE ANNUAL WELLNESS EXAM: Status: ACTIVE | Noted: 2020-07-23

## 2022-05-01 PROBLEM — E86.0 DEHYDRATION: Status: RESOLVED | Noted: 2022-04-01 | Resolved: 2022-05-01

## 2022-05-01 PROBLEM — J10.1 INFLUENZA A: Status: RESOLVED | Noted: 2022-03-18 | Resolved: 2022-05-01

## 2022-05-29 PROBLEM — Z00.00 ENCOUNTER FOR MEDICARE ANNUAL WELLNESS EXAM: Status: RESOLVED | Noted: 2020-07-23 | Resolved: 2022-05-29

## 2022-09-20 ENCOUNTER — HOSPITAL ENCOUNTER (INPATIENT)
Age: 82
LOS: 1 days | Discharge: HOME | DRG: 417 | End: 2022-09-26
Attending: INTERNAL MEDICINE | Admitting: INTERNAL MEDICINE
Payer: MEDICARE

## 2022-09-20 ENCOUNTER — HOSPITAL ENCOUNTER (EMERGENCY)
Age: 82
Discharge: ANOTHER HOSPITAL | End: 2022-09-20
Attending: STUDENT IN AN ORGANIZED HEALTH CARE EDUCATION/TRAINING PROGRAM
Payer: MEDICARE

## 2022-09-20 ENCOUNTER — APPOINTMENT (OUTPATIENT)
Dept: ULTRASOUND IMAGING | Age: 82
End: 2022-09-20
Payer: MEDICARE

## 2022-09-20 ENCOUNTER — APPOINTMENT (OUTPATIENT)
Dept: CT IMAGING | Age: 82
End: 2022-09-20
Payer: MEDICARE

## 2022-09-20 VITALS
RESPIRATION RATE: 15 BRPM | SYSTOLIC BLOOD PRESSURE: 109 MMHG | OXYGEN SATURATION: 97 % | HEART RATE: 62 BPM | WEIGHT: 132 LBS | BODY MASS INDEX: 20.72 KG/M2 | TEMPERATURE: 97.8 F | HEIGHT: 67 IN | DIASTOLIC BLOOD PRESSURE: 48 MMHG

## 2022-09-20 DIAGNOSIS — K80.20 GALL BLADDER STONES: Primary | ICD-10-CM

## 2022-09-20 DIAGNOSIS — R10.31 RIGHT LOWER QUADRANT ABDOMINAL PAIN: ICD-10-CM

## 2022-09-20 DIAGNOSIS — K80.20 CHOLELITHIASIS WITHOUT CHOLECYSTITIS: Primary | ICD-10-CM

## 2022-09-20 PROBLEM — R52 INTRACTABLE PAIN: Status: ACTIVE | Noted: 2022-09-20

## 2022-09-20 LAB
ALBUMIN SERPL-MCNC: 4.6 G/DL (ref 3.2–4.6)
ALBUMIN/GLOB SERPL: 2 {RATIO}
ALP SERPL-CCNC: 105 U/L (ref 45–117)
ALT SERPL-CCNC: 28 U/L (ref 13–61)
ANION GAP SERPL CALC-SCNC: 10 MMOL/L (ref 7–16)
APPEARANCE UR: NORMAL
AST SERPL-CCNC: 28 U/L (ref 15–37)
BASOPHILS # BLD: 0 K/UL (ref 0–0.2)
BASOPHILS NFR BLD: 1 % (ref 0–2)
BILIRUB SERPL-MCNC: 0.5 MG/DL (ref 0.2–1.1)
BILIRUB UR QL: NEGATIVE
BUN SERPL-MCNC: 16 MG/DL (ref 7–18)
CALCIUM SERPL-MCNC: 9.8 MG/DL (ref 8.3–10.4)
CHLORIDE SERPL-SCNC: 100 MMOL/L (ref 98–107)
CO2 SERPL-SCNC: 28 MMOL/L (ref 21–32)
COLOR UR: YELLOW
CREAT SERPL-MCNC: 0.74 MG/DL (ref 0.6–1)
DIFFERENTIAL METHOD BLD: ABNORMAL
EOSINOPHIL # BLD: 0.1 K/UL (ref 0–0.8)
EOSINOPHIL NFR BLD: 2 % (ref 0.5–7.8)
ERYTHROCYTE [DISTWIDTH] IN BLOOD BY AUTOMATED COUNT: 13.1 % (ref 11.9–14.6)
GLOBULIN SER CALC-MCNC: 2.3 G/DL (ref 2.3–3.5)
GLUCOSE SERPL-MCNC: 101 MG/DL (ref 65–100)
GLUCOSE UR STRIP.AUTO-MCNC: NEGATIVE MG/DL
HCT VFR BLD AUTO: 37.6 % (ref 35.8–46.3)
HGB BLD-MCNC: 12.6 G/DL (ref 11.7–15.4)
HGB UR QL STRIP: NEGATIVE
IMM GRANULOCYTES # BLD AUTO: 0 K/UL (ref 0–0.5)
IMM GRANULOCYTES NFR BLD AUTO: 0 % (ref 0–5)
KETONES UR QL STRIP.AUTO: NEGATIVE MG/DL
LACTATE SERPL-SCNC: 1.1 MMOL/L (ref 0.4–2)
LEUKOCYTE ESTERASE UR QL STRIP.AUTO: NEGATIVE
LIPASE SERPL-CCNC: 54 U/L (ref 13–60)
LYMPHOCYTES # BLD: 1.7 K/UL (ref 0.5–4.6)
LYMPHOCYTES NFR BLD: 27 % (ref 13–44)
MCH RBC QN AUTO: 31.7 PG (ref 26.1–32.9)
MCHC RBC AUTO-ENTMCNC: 33.5 G/DL (ref 31.4–35)
MCV RBC AUTO: 94.5 FL (ref 79.6–97.8)
MONOCYTES # BLD: 0.8 K/UL (ref 0.1–1.3)
MONOCYTES NFR BLD: 13 % (ref 4–12)
NEUTS SEG # BLD: 3.7 K/UL (ref 1.7–8.2)
NEUTS SEG NFR BLD: 58 % (ref 43–78)
NITRITE UR QL STRIP.AUTO: NEGATIVE
NRBC # BLD: 0 K/UL (ref 0–0.2)
PH UR STRIP: 8 [PH] (ref 5–9)
PLATELET # BLD AUTO: 272 K/UL (ref 150–450)
PMV BLD AUTO: 9 FL (ref 9.4–12.3)
POTASSIUM SERPL-SCNC: 4.3 MMOL/L (ref 3.5–5.1)
PROT SERPL-MCNC: 6.9 G/DL (ref 6.4–8.2)
PROT UR STRIP-MCNC: NEGATIVE MG/DL
RBC # BLD AUTO: 3.98 M/UL (ref 4.05–5.2)
SODIUM SERPL-SCNC: 138 MMOL/L (ref 136–145)
SP GR UR REFRACTOMETRY: 1.01 (ref 1–1.02)
UROBILINOGEN UR QL STRIP.AUTO: 0.2 EU/DL (ref 0.2–1)
WBC # BLD AUTO: 6.4 K/UL (ref 4.3–11.1)

## 2022-09-20 PROCEDURE — 6360000004 HC RX CONTRAST MEDICATION: Performed by: STUDENT IN AN ORGANIZED HEALTH CARE EDUCATION/TRAINING PROGRAM

## 2022-09-20 PROCEDURE — 96372 THER/PROPH/DIAG INJ SC/IM: CPT

## 2022-09-20 PROCEDURE — 85025 COMPLETE CBC W/AUTO DIFF WBC: CPT

## 2022-09-20 PROCEDURE — 96375 TX/PRO/DX INJ NEW DRUG ADDON: CPT

## 2022-09-20 PROCEDURE — 83605 ASSAY OF LACTIC ACID: CPT

## 2022-09-20 PROCEDURE — G0378 HOSPITAL OBSERVATION PER HR: HCPCS

## 2022-09-20 PROCEDURE — 81003 URINALYSIS AUTO W/O SCOPE: CPT

## 2022-09-20 PROCEDURE — 83690 ASSAY OF LIPASE: CPT

## 2022-09-20 PROCEDURE — 6370000000 HC RX 637 (ALT 250 FOR IP): Performed by: STUDENT IN AN ORGANIZED HEALTH CARE EDUCATION/TRAINING PROGRAM

## 2022-09-20 PROCEDURE — 80053 COMPREHEN METABOLIC PANEL: CPT

## 2022-09-20 PROCEDURE — 96376 TX/PRO/DX INJ SAME DRUG ADON: CPT

## 2022-09-20 PROCEDURE — 96374 THER/PROPH/DIAG INJ IV PUSH: CPT

## 2022-09-20 PROCEDURE — 6360000002 HC RX W HCPCS: Performed by: STUDENT IN AN ORGANIZED HEALTH CARE EDUCATION/TRAINING PROGRAM

## 2022-09-20 PROCEDURE — 6370000000 HC RX 637 (ALT 250 FOR IP): Performed by: INTERNAL MEDICINE

## 2022-09-20 PROCEDURE — 76705 ECHO EXAM OF ABDOMEN: CPT

## 2022-09-20 PROCEDURE — 2580000003 HC RX 258: Performed by: STUDENT IN AN ORGANIZED HEALTH CARE EDUCATION/TRAINING PROGRAM

## 2022-09-20 PROCEDURE — 6360000002 HC RX W HCPCS: Performed by: INTERNAL MEDICINE

## 2022-09-20 PROCEDURE — 99285 EMERGENCY DEPT VISIT HI MDM: CPT

## 2022-09-20 PROCEDURE — 2580000003 HC RX 258: Performed by: INTERNAL MEDICINE

## 2022-09-20 PROCEDURE — 74177 CT ABD & PELVIS W/CONTRAST: CPT

## 2022-09-20 PROCEDURE — 96361 HYDRATE IV INFUSION ADD-ON: CPT

## 2022-09-20 RX ORDER — POTASSIUM CHLORIDE 7.45 MG/ML
10 INJECTION INTRAVENOUS PRN
Status: DISCONTINUED | OUTPATIENT
Start: 2022-09-20 | End: 2022-09-26 | Stop reason: HOSPADM

## 2022-09-20 RX ORDER — MORPHINE SULFATE 2 MG/ML
2 INJECTION, SOLUTION INTRAMUSCULAR; INTRAVENOUS ONCE
Status: COMPLETED | OUTPATIENT
Start: 2022-09-20 | End: 2022-09-20

## 2022-09-20 RX ORDER — ONDANSETRON 4 MG/1
4 TABLET, ORALLY DISINTEGRATING ORAL EVERY 8 HOURS PRN
Status: DISCONTINUED | OUTPATIENT
Start: 2022-09-20 | End: 2022-09-26 | Stop reason: HOSPADM

## 2022-09-20 RX ORDER — ONDANSETRON 2 MG/ML
4 INJECTION INTRAMUSCULAR; INTRAVENOUS
Status: COMPLETED | OUTPATIENT
Start: 2022-09-20 | End: 2022-09-20

## 2022-09-20 RX ORDER — MAGNESIUM HYDROXIDE/ALUMINUM HYDROXICE/SIMETHICONE 120; 1200; 1200 MG/30ML; MG/30ML; MG/30ML
30 SUSPENSION ORAL EVERY 6 HOURS PRN
Status: DISCONTINUED | OUTPATIENT
Start: 2022-09-20 | End: 2022-09-26 | Stop reason: HOSPADM

## 2022-09-20 RX ORDER — POTASSIUM CHLORIDE 20 MEQ/1
40 TABLET, EXTENDED RELEASE ORAL PRN
Status: DISCONTINUED | OUTPATIENT
Start: 2022-09-20 | End: 2022-09-26 | Stop reason: HOSPADM

## 2022-09-20 RX ORDER — DOCUSATE SODIUM 100 MG/1
100 CAPSULE, LIQUID FILLED ORAL DAILY
Status: DISCONTINUED | OUTPATIENT
Start: 2022-09-20 | End: 2022-09-26 | Stop reason: HOSPADM

## 2022-09-20 RX ORDER — ACETAMINOPHEN 325 MG/1
650 TABLET ORAL EVERY 6 HOURS PRN
Status: DISCONTINUED | OUTPATIENT
Start: 2022-09-20 | End: 2022-09-22

## 2022-09-20 RX ORDER — MAGNESIUM SULFATE IN WATER 40 MG/ML
2000 INJECTION, SOLUTION INTRAVENOUS PRN
Status: DISCONTINUED | OUTPATIENT
Start: 2022-09-20 | End: 2022-09-26 | Stop reason: HOSPADM

## 2022-09-20 RX ORDER — MORPHINE SULFATE 2 MG/ML
4 INJECTION, SOLUTION INTRAMUSCULAR; INTRAVENOUS ONCE
Status: COMPLETED | OUTPATIENT
Start: 2022-09-20 | End: 2022-09-20

## 2022-09-20 RX ORDER — ACETAMINOPHEN 650 MG/1
650 SUPPOSITORY RECTAL EVERY 6 HOURS PRN
Status: DISCONTINUED | OUTPATIENT
Start: 2022-09-20 | End: 2022-09-22

## 2022-09-20 RX ORDER — PANTOPRAZOLE SODIUM 40 MG/1
40 TABLET, DELAYED RELEASE ORAL
Status: DISCONTINUED | OUTPATIENT
Start: 2022-09-21 | End: 2022-09-26 | Stop reason: HOSPADM

## 2022-09-20 RX ORDER — SODIUM CHLORIDE 0.9 % (FLUSH) 0.9 %
5-40 SYRINGE (ML) INJECTION PRN
Status: DISCONTINUED | OUTPATIENT
Start: 2022-09-20 | End: 2022-09-26 | Stop reason: HOSPADM

## 2022-09-20 RX ORDER — POLYETHYLENE GLYCOL 3350 17 G/17G
17 POWDER, FOR SOLUTION ORAL 2 TIMES DAILY
Status: DISCONTINUED | OUTPATIENT
Start: 2022-09-20 | End: 2022-09-26 | Stop reason: HOSPADM

## 2022-09-20 RX ORDER — SODIUM CHLORIDE 0.9 % (FLUSH) 0.9 %
5-40 SYRINGE (ML) INJECTION 2 TIMES DAILY
Status: DISCONTINUED | OUTPATIENT
Start: 2022-09-20 | End: 2022-09-20 | Stop reason: HOSPADM

## 2022-09-20 RX ORDER — SODIUM CHLORIDE, SODIUM LACTATE, POTASSIUM CHLORIDE, CALCIUM CHLORIDE 600; 310; 30; 20 MG/100ML; MG/100ML; MG/100ML; MG/100ML
INJECTION, SOLUTION INTRAVENOUS CONTINUOUS
Status: DISCONTINUED | OUTPATIENT
Start: 2022-09-20 | End: 2022-09-21

## 2022-09-20 RX ORDER — 0.9 % SODIUM CHLORIDE 0.9 %
100 INTRAVENOUS SOLUTION INTRAVENOUS ONCE
Status: COMPLETED | OUTPATIENT
Start: 2022-09-20 | End: 2022-09-20

## 2022-09-20 RX ORDER — SODIUM CHLORIDE 0.9 % (FLUSH) 0.9 %
5-40 SYRINGE (ML) INJECTION EVERY 12 HOURS SCHEDULED
Status: DISCONTINUED | OUTPATIENT
Start: 2022-09-20 | End: 2022-09-26 | Stop reason: HOSPADM

## 2022-09-20 RX ORDER — BISACODYL 10 MG
10 SUPPOSITORY, RECTAL RECTAL DAILY PRN
Status: DISCONTINUED | OUTPATIENT
Start: 2022-09-20 | End: 2022-09-22

## 2022-09-20 RX ORDER — ONDANSETRON 2 MG/ML
4 INJECTION INTRAMUSCULAR; INTRAVENOUS EVERY 6 HOURS PRN
Status: DISCONTINUED | OUTPATIENT
Start: 2022-09-20 | End: 2022-09-26 | Stop reason: HOSPADM

## 2022-09-20 RX ORDER — MORPHINE SULFATE 2 MG/ML
2 INJECTION, SOLUTION INTRAMUSCULAR; INTRAVENOUS
Status: DISCONTINUED | OUTPATIENT
Start: 2022-09-20 | End: 2022-09-26 | Stop reason: HOSPADM

## 2022-09-20 RX ORDER — OXYCODONE HYDROCHLORIDE 5 MG/1
5 TABLET ORAL EVERY 4 HOURS PRN
Status: DISCONTINUED | OUTPATIENT
Start: 2022-09-20 | End: 2022-09-22

## 2022-09-20 RX ORDER — POLYETHYLENE GLYCOL 3350 17 G/17G
POWDER, FOR SOLUTION ORAL PRN
COMMUNITY

## 2022-09-20 RX ORDER — ENOXAPARIN SODIUM 100 MG/ML
40 INJECTION SUBCUTANEOUS DAILY
Status: DISCONTINUED | OUTPATIENT
Start: 2022-09-20 | End: 2022-09-23

## 2022-09-20 RX ORDER — BUTYROSPERMUM PARKII(SHEA BUTTER), SIMMONDSIA CHINENSIS (JOJOBA) SEED OIL, ALOE BARBADENSIS LEAF EXTRACT .01; 1; 3.5 G/100G; G/100G; G/100G
LIQUID TOPICAL
COMMUNITY

## 2022-09-20 RX ORDER — FAMOTIDINE 20 MG/1
10 TABLET, FILM COATED ORAL DAILY PRN
Status: DISCONTINUED | OUTPATIENT
Start: 2022-09-20 | End: 2022-09-26 | Stop reason: HOSPADM

## 2022-09-20 RX ORDER — SODIUM CHLORIDE 9 MG/ML
INJECTION, SOLUTION INTRAVENOUS PRN
Status: DISCONTINUED | OUTPATIENT
Start: 2022-09-20 | End: 2022-09-26 | Stop reason: HOSPADM

## 2022-09-20 RX ADMIN — SODIUM CHLORIDE, PRESERVATIVE FREE 10 ML: 5 INJECTION INTRAVENOUS at 10:59

## 2022-09-20 RX ADMIN — HYOSCYAMINE SULFATE 125 MCG: 0.12 TABLET ORAL; SUBLINGUAL at 12:18

## 2022-09-20 RX ADMIN — MORPHINE SULFATE 2 MG: 2 INJECTION, SOLUTION INTRAMUSCULAR; INTRAVENOUS at 14:35

## 2022-09-20 RX ADMIN — ONDANSETRON 4 MG: 2 INJECTION INTRAMUSCULAR; INTRAVENOUS at 14:12

## 2022-09-20 RX ADMIN — SODIUM CHLORIDE 100 ML: 9 INJECTION, SOLUTION INTRAVENOUS at 10:59

## 2022-09-20 RX ADMIN — MORPHINE SULFATE 4 MG: 2 INJECTION, SOLUTION INTRAMUSCULAR; INTRAVENOUS at 15:15

## 2022-09-20 RX ADMIN — SODIUM CHLORIDE, PRESERVATIVE FREE 5 ML: 5 INJECTION INTRAVENOUS at 20:15

## 2022-09-20 RX ADMIN — SODIUM CHLORIDE, POTASSIUM CHLORIDE, SODIUM LACTATE AND CALCIUM CHLORIDE: 600; 310; 30; 20 INJECTION, SOLUTION INTRAVENOUS at 20:14

## 2022-09-20 RX ADMIN — ENOXAPARIN SODIUM 40 MG: 100 INJECTION SUBCUTANEOUS at 20:14

## 2022-09-20 RX ADMIN — DOCUSATE SODIUM 100 MG: 100 CAPSULE, LIQUID FILLED ORAL at 20:14

## 2022-09-20 RX ADMIN — IOPAMIDOL 100 ML: 755 INJECTION, SOLUTION INTRAVENOUS at 10:59

## 2022-09-20 RX ADMIN — MORPHINE SULFATE 2 MG: 2 INJECTION, SOLUTION INTRAMUSCULAR; INTRAVENOUS at 14:12

## 2022-09-20 ASSESSMENT — PAIN SCALES - GENERAL
PAINLEVEL_OUTOF10: 8
PAINLEVEL_OUTOF10: 4
PAINLEVEL_OUTOF10: 8
PAINLEVEL_OUTOF10: 3
PAINLEVEL_OUTOF10: 0
PAINLEVEL_OUTOF10: 3
PAINLEVEL_OUTOF10: 5
PAINLEVEL_OUTOF10: 8

## 2022-09-20 ASSESSMENT — PAIN DESCRIPTION - ORIENTATION
ORIENTATION: RIGHT
ORIENTATION: RIGHT;UPPER
ORIENTATION: RIGHT

## 2022-09-20 ASSESSMENT — PAIN DESCRIPTION - PAIN TYPE: TYPE: ACUTE PAIN

## 2022-09-20 ASSESSMENT — PAIN DESCRIPTION - FREQUENCY
FREQUENCY: CONTINUOUS
FREQUENCY: INTERMITTENT

## 2022-09-20 ASSESSMENT — PAIN DESCRIPTION - DESCRIPTORS
DESCRIPTORS: ACHING
DESCRIPTORS: SPASM;SHARP

## 2022-09-20 ASSESSMENT — ENCOUNTER SYMPTOMS
ABDOMINAL PAIN: 1
DIARRHEA: 0
SORE THROAT: 0
COUGH: 0
VOMITING: 0
NAUSEA: 0
SHORTNESS OF BREATH: 0
PHOTOPHOBIA: 0

## 2022-09-20 ASSESSMENT — PAIN - FUNCTIONAL ASSESSMENT
PAIN_FUNCTIONAL_ASSESSMENT: 0-10
PAIN_FUNCTIONAL_ASSESSMENT: PREVENTS OR INTERFERES SOME ACTIVE ACTIVITIES AND ADLS

## 2022-09-20 ASSESSMENT — PAIN DESCRIPTION - LOCATION
LOCATION: ABDOMEN
LOCATION: GROIN
LOCATION: GROIN

## 2022-09-20 ASSESSMENT — PAIN DESCRIPTION - ONSET: ONSET: AWAKENED FROM SLEEP

## 2022-09-20 NOTE — PROGRESS NOTES
TRANSFER - IN REPORT:    Verbal report received from morales(name) on Demetrice Wolf  being received from [facility](unit) for routine progression of patient care      Report consisted of patients Situation, Background, Assessment and   Recommendations(SBAR). Information from the following report(s) ED SBAR was reviewed with the receiving nurse. Opportunity for questions and clarification was provided.       Assessment completed upon patients arrival to unit and care assume    Awaiting arrival, report given to mike nj

## 2022-09-20 NOTE — H&P
Hospitalist History and Physical   Admit Date:  2022  6:39 PM   Name:  Janay Akins   Age:  81 y.o. Sex:  female  :  1940   MRN:  [id number]   Room:  [facility]    Presenting Complaint: No chief complaint on file. Reason(s) for Admission: Intractable pain [R52]     History of Present Illness:   Janay Akins is a 81 y.o. female with medical history of chronic constipation, remote peptic ulcer duodenal  and significant endometriosis. She has had a cervical laminectomy tonsillectomy and tubal ligation in the past but relatively benign medical history for her age. Who presented with 1-1/2-year history of postprandial abdominal discomfort-recently over the past several weeks increased abdominal discomfort and particularly right lower quadrant pain which progressed to the point where she sought care in an emergency department today at [facility] facility. Initially felt to be urinary source but urinalysis negative and CT abdomen and pelvis revealed sludge versus other and a very thin-walled hydropic gallbladder which prompted ultrasound which showed that the gallbladder is nearly completely filled with stones. Patient had intractable pain and would not be discharged home as recommended because of intractable pain requiring IV narcotics and [facility] requested admission here for intractable pain. Surgery was notified and they recommended outpatient follow-up in the office. Patient requesting surgical consultation here. She will be admitted to observation for analgesia. N.p.o. status IV hydration. Review of Systems:  10 systems reviewed and negative except as noted in HPI. Assessment & Plan:     Principal Problem:    Intractable pain  Plan: Secondary to hydrops gallbladder with stones occupying nearly entire gallbladder and thin walls. Oxycodone moderate pain IV narcotics for severe pain which was reason for admission. IV hydration.   Surgical consult. Chronic constipation--MiraLAX twice daily stool softener-monitor for worsening with narcotics    History of duodenal ulcer disease remote. Benign labs. Morning PPI daily. Anticipated discharge needs:   Pending clinical course    Diet: Diet NPO Exceptions are: Sips of Water with Meds, Sips of Clear Liquids  VTE ppx: Lovenox  Code status: Full Code    Hospital Problems:  Principal Problem:    Intractable pain  Resolved Problems:    * No resolved hospital problems. *       Past History:     Past Medical History:   Diagnosis Date    Constipation     Duodenal ulcer     Endometriosis        Past Surgical History:   Procedure Laterality Date    CERVICAL LAMINECTOMY  1998    TONSILLECTOMY      TUBAL LIGATION          Social History     Tobacco Use    Smoking status: Never    Smokeless tobacco: Never   Substance Use Topics    Alcohol use: No      Social History     Substance and Sexual Activity   Drug Use No       Family History   Problem Relation Age of Onset    Diabetes Brother     Diabetes Father     Heart Disease Mother     Heart Disease Brother         Immunization History   Administered Date(s) Administered    COVID-19, PFIZER PURPLE top, DILUTE for use, (age 15 y+), 30mcg/0.3mL 02/12/2021     Allergies   Allergen Reactions    Sulfamethoxazole-Trimethoprim Hives    Oxycodone-Acetaminophen Nausea And Vomiting     Prior to Admit Medications:  Current Outpatient Medications   Medication Instructions    DIPHENHYDRAMINE HCL PO Oral    FEXOFENADINE HCL PO Oral    Magnesium Citrate 100 MG CAPS Oral    Multiple Vitamins-Minerals (MULTIVITAMIN ADULTS PO) 1 tablet, Oral, DAILY    polyethylene glycol (GLYCOLAX) 17 g packet Oral, PRN    Probiotic Product (PROBIOTIC BLEND PO) Strength: ; Form: ; SIG: take 1 tab  daily    sucralfate (CARAFATE) 1 GM tablet TAKE 1 TABLET BY MOUTH FOUR TIMES DAILY         Objective:   No data found.          Estimated body mass index is 20.67 kg/m² as calculated from the following: Height as of an earlier encounter on 9/20/22: 5' 7\" (1.702 m). Weight as of an earlier encounter on 9/20/22: 132 lb (59.9 kg). No intake or output data in the 24 hours ending 09/20/22 1927      Physical Exam:    There were no vitals taken for this visit. General:    Alert and oriented x3. Complaining of intractable pain. Head:  Normocephalic, atraumatic  Eyes:  Sclerae appear normal.  Pupils equally round. ENT:  Nares appear normal, no drainage. Moist oral mucosa  Neck:  No restricted ROM. Trachea midline   CV:   RRR. No m/r/g. No jugular venous distension. Lungs:   CTAB. No wheezing, rhonchi, or rales. Symmetric expansion. Abdomen:   Distention. Bowel sounds present. Pain right lower quadrant to palpation extends to right upper quadrant. Extremities: No cyanosis or clubbing. No edema  Skin:     No rashes and normal coloration. Warm and dry. Neuro:  CN II-XII grossly intact. Sensation intact. A&Ox3  Psych:  Normal mood and affect.       I have personally reviewed labs and tests showing:  Recent Labs:  Recent Results (from the past 24 hour(s))   Urinalysis w rflx microscopic    Collection Time: 09/20/22  9:48 AM   Result Value Ref Range    Color, UA YELLOW      Appearance SLIGHTLY CLOUDY      Specific [place], UA 1.015 1.001 - 1.023      pH, Urine 8.0 5.0 - 9.0      Protein, UA Negative NEG mg/dL    Glucose, UA Negative mg/dL    Ketones, Urine Negative NEG mg/dL    Bilirubin Urine Negative NEG      Blood, Urine Negative NEG      Urobilinogen, Urine 0.2 0.2 - 1.0 EU/dL    Nitrite, Urine Negative NEG      Leukocyte Esterase, Urine Negative NEG     CBC with Auto Differential    Collection Time: 09/20/22  9:48 AM   Result Value Ref Range    WBC 6.4 4.3 - 11.1 K/uL    RBC 3.98 (L) 4.05 - 5.20 M/uL    Hemoglobin 12.6 11.7 - 15.4 g/dL    Hematocrit 37.6 35.8 - 46.3 %    MCV 94.5 79.6 - 97.8 FL    MCH 31.7 26.1 - 32.9 PG    MCHC 33.5 31.4 - 35.0 g/dL    RDW 13.1 11.9 - 14.6 %    Platelets 416 103 - 450 K/uL    MPV 9.0 (L) 9.4 - 12.3 FL    nRBC 0.00 0.0 - 0.2 K/uL    Differential Type AUTOMATED      Seg Neutrophils 58 43 - 78 %    Lymphocytes 27 13 - 44 %    Monocytes 13 (H) 4.0 - 12.0 %    Eosinophils % 2 0.5 - 7.8 %    Basophils 1 0.0 - 2.0 %    Immature Granulocytes 0 0.0 - 5.0 %    Segs Absolute 3.7 1.7 - 8.2 K/UL    Absolute Lymph # 1.7 0.5 - 4.6 K/UL    Absolute Mono # 0.8 0.1 - 1.3 K/UL    Absolute Eos # 0.1 0.0 - 0.8 K/UL    Basophils Absolute 0.0 0.0 - 0.2 K/UL    Absolute Immature Granulocyte 0.0 0.0 - 0.5 K/UL   Comprehensive Metabolic Panel    Collection Time: 09/20/22  9:48 AM   Result Value Ref Range    Sodium 138 136 - 145 mmol/L    Potassium 4.3 3.5 - 5.1 mmol/L    Chloride 100 98 - 107 mmol/L    CO2 28 21 - 32 mmol/L    Anion Gap 10 7.0 - 16.0 mmol/L    Glucose 101 (H) 65 - 100 mg/dL    BUN 16 7.0 - 18.0 MG/DL    Creatinine 0.74 0.6 - 1.0 MG/DL    GFR African American >97 >60 ml/min/1.73m2    GFR Non- >60 >60 ml/min/1.73m2    Calcium 9.8 8.3 - 10.4 MG/DL    Total Bilirubin 0.5 0.2 - 1.1 MG/DL    ALT 28 13.0 - 61.0 U/L    AST 28 15 - 37 U/L    Alk Phosphatase 105 45.0 - 117.0 U/L    Total Protein 6.9 6.4 - 8.2 g/dL    Albumin 4.6 3.2 - 4.6 g/dL    Globulin 2.3 2.3 - 3.5 g/dL    Albumin/Globulin Ratio 2.0     Lactic Acid    Collection Time: 09/20/22  9:48 AM   Result Value Ref Range    Lactic Acid 1.10 0.4 - 2.0 mmol/L   Lipase    Collection Time: 09/20/22  9:48 AM   Result Value Ref Range    Lipase 54 13 - 60 U/L       I have personally reviewed imaging studies showing:  CT ABDOMEN PELVIS W IV CONTRAST Additional Contrast? None    Result Date: 9/20/2022  CT OF THE ABDOMEN AND PELVIS WITH CONTRAST.  CLINICAL INDICATION: Right lower quadrant abdominal pain PROCEDURE: Serial thin section axial images obtained from the lung bases through the proximal femurs following the administration of 100 cc of Isovue 370 intravenous contrast.  Radiation dose reduction techniques were used for this study. Our CT scanners use one or all of the following: Automated exposure control, adjusted of the mA and/or kV according to patient size, iterative reconstruction COMPARISON: CT abdomen and pelvis dated 8/21/2020 FINDINGS: CT ABDOMEN: The gallbladder is very distended with a small amount of either dependent sludge or small gallstones. The gallbladder wall is thin. No pericholecystic inflammatory fat stranding. The appendix is normal. The bowel is normal in course, caliber, and wall thickness. The stomach is decompressed. The pancreas is normal. The liver and spleen are unremarkable. The kidneys are symmetric in size and contrast enhancement. There is no hydronephrosis. The adrenal glands are normal. There is no ascites or adenopathy. CT PELVIS: The bladder is very distended with smooth thin walls. The uterus and adnexa are unremarkable. The rectum is normal. No free fluid accumulating filling the pelvis. There is no inguinal hernia. No aggressive osseous lesions identified. 1. Hydropic gallbladder with thin walls. Dependent sludge or small stones noted in the gallbladder fundus. If there is clinical concern for cholecystitis, consider further evaluation with right upper quadrant abdominal ultrasound. 2. Otherwise no acute abdominal or pelvic abnormality. The appendix is normal.     US ABDOMEN LIMITED Specify organ? GALLBLADDER    Result Date: 9/20/2022  Right upper quadrant abdominal ultrasound. CLINICAL INDICATION: Cholelithiasis PROCEDURE: Realtime grayscale and color Doppler evaluation of the right upper abdominal quadrant. COMPARISON: CT of the abdomen and pelvis dated 9/20/2022 FINDINGS: The liver is normal in size and echogenicity without focal lesion the gallbladder is distended and near completely full of gallstones. The gallbladder wall is thin at 2 mm. A negative sonographic Led[place]ma  sign is reported. The common bile duct measures 3.7 mm. The imaged portion the pancreas is unremarkable.  The right

## 2022-09-20 NOTE — ED PROVIDER NOTES
Emergency Department Provider Note                   PCP:                Margarita Perez MD               Age: 81 y.o. Sex: female       ICD-10-CM    1. Gall bladder stones  K80.20       2. Right lower quadrant abdominal pain  R10.31           DISPOSITION Decision To Transfer 09/20/2022 03:40:05 PM        MDM  Number of Diagnoses or Management Options  Gall bladder stones  Right lower quadrant abdominal pain  Diagnosis management comments: 81-year-old female presents to the emergency department with pain to her right lower quadrant. Began early this morning where a broad-based work-up was ordered. Pain medication was offered but patient declined. Abdomen is nontender to palpation but she intermittently grabs the right lower quadrant and complains of pain and spasming in this region. Patient then given Levsin with no resolution of symptoms. Urinalysis normal, RRR lab work showed normal white count, stable H&H, normal electrolytes and kidney function, normal LFTs, Normal lactic. CT scan patient abdomen pelvis shows enlarged gallbladder without any other findings, ultrasound gallbladder shows distended gallbladder with cholelithiasis without choledocholithiasis or cholecystitis. Surgery consulted who advised outpatient follow-up. Patient required multiple doses of morphine here in the emergency department and still feels that she is in too much discomfort to go home. Hospitalist consulted who agreed with admission. Patient family voiced understanding and agreement with this plan.        Amount and/or Complexity of Data Reviewed  Clinical lab tests: ordered and reviewed  Tests in the radiology section of CPT®: ordered and reviewed  Discussion of test results with the performing providers: yes  Discuss the patient with other providers: yes    Risk of Complications, Morbidity, and/or Mortality  Presenting problems: moderate  Diagnostic procedures: moderate  Management options: moderate Orders Placed This Encounter   Procedures    CT ABDOMEN PELVIS W IV CONTRAST Additional Contrast? None    US ABDOMEN LIMITED Specify organ? GALLBLADDER    Urinalysis w rflx microscopic    CBC with Auto Differential    Comprehensive Metabolic Panel    Lactic Acid        Medications   sodium chloride flush 0.9 % injection 5-40 mL (10 mLs IntraVENous Given 9/20/22 1059)   0.9 % sodium chloride bolus (0 mLs IntraVENous Stopped 9/20/22 1218)   iopamidol (ISOVUE-370) 76 % injection 100 mL (100 mLs IntraVENous Given 9/20/22 1059)   hyoscyamine (LEVSIN/SL) sublingual tablet 125 mcg (125 mcg SubLINGual Given 9/20/22 1218)   morphine injection 2 mg (2 mg IntraVENous Given 9/20/22 1412)   ondansetron (ZOFRAN) injection 4 mg (4 mg IntraVENous Given 9/20/22 1412)   morphine injection 2 mg (2 mg IntraVENous Given 9/20/22 1435)   morphine injection 4 mg (4 mg IntraVENous Given 9/20/22 1515)       New Prescriptions    No medications on file        Mary Thompson is a 81 y.o. female who presents to the Emergency Department with chief complaint of    Chief Complaint   Patient presents with    Groin Pain      62-year-old female presents to the Emergency Department complaining of right upper quad abdominal pain that began earlier this morning. History of recurrent abdominal pain. States it began this morning and has not improved. Has not taken any medication for this prior to arrival.  Denies vomiting. Reports history of chronic constipation as well as history of endometriosis as well as prior duodenal ulcer. No prior symptoms similar to current discomfort. Denies dysuria hematuria. Denies history of kidney stones. Denies any abdominal trauma. States the pain is sharp and comes on in waves. Denies melena or hematochezia. Review of Systems   Constitutional:  Negative for chills and fever. HENT:  Negative for sore throat. Eyes:  Negative for photophobia.    Respiratory:  Negative for cough and shortness of breath. Cardiovascular:  Negative for chest pain. Gastrointestinal:  Positive for abdominal pain. Negative for diarrhea, nausea and vomiting. Genitourinary:  Negative for dysuria. Musculoskeletal:  Negative for neck pain and neck stiffness. Skin:  Negative for rash. Neurological:  Negative for syncope and headaches. Psychiatric/Behavioral:  Negative for confusion. All other systems reviewed and are negative. Past Medical History:   Diagnosis Date    Constipation     Duodenal ulcer     Endometriosis         Past Surgical History:   Procedure Laterality Date    CERVICAL LAMINECTOMY  1998    TONSILLECTOMY      TUBAL LIGATION          Family History   Problem Relation Age of Onset    Diabetes Brother     Diabetes Father     Heart Disease Mother     Heart Disease Brother         Social History     Socioeconomic History    Marital status:      Spouse name: None    Number of children: None    Years of education: None    Highest education level: None   Tobacco Use    Smoking status: Never    Smokeless tobacco: Never   Substance and Sexual Activity    Alcohol use: No    Drug use: No         Sulfamethoxazole-trimethoprim and Oxycodone-acetaminophen     Previous Medications    DIPHENHYDRAMINE HCL PO    Take by mouth    FEXOFENADINE HCL PO    Take by mouth    MAGNESIUM CITRATE 100 MG CAPS    Take by mouth    MULTIPLE VITAMINS-MINERALS (MULTIVITAMIN ADULTS PO)    Take 1 tablet by mouth daily    POLYETHYLENE GLYCOL (GLYCOLAX) 17 G PACKET    Take by mouth as needed    PROBIOTIC PRODUCT (PROBIOTIC BLEND PO)    Strength: ; Form: ; SIG: take 1 tab  daily    SUCRALFATE (CARAFATE) 1 GM TABLET    TAKE 1 TABLET BY MOUTH FOUR TIMES DAILY        Vitals signs and nursing note reviewed. No data found. Physical Exam  Vitals and nursing note reviewed. Constitutional:       General: She is not in acute distress. Appearance: Normal appearance. HENT:      Head: Normocephalic. Nose: Nose normal.      Mouth/Throat:      Mouth: Mucous membranes are moist.   Eyes:      Extraocular Movements: Extraocular movements intact. Cardiovascular:      Rate and Rhythm: Normal rate and regular rhythm. Pulses: Normal pulses. Heart sounds: Normal heart sounds. Pulmonary:      Effort: Pulmonary effort is normal. No respiratory distress. Breath sounds: Normal breath sounds. Abdominal:      General: Abdomen is flat. Palpations: Abdomen is soft. Tenderness: There is no abdominal tenderness. Comments: No right lower quadrant abdominal tenderness to palpation, there is no rebound or guarding. Patient intermittently grabs her right lower quadrant and complains of a spasm in this region. Nontender to palpation. Musculoskeletal:         General: No swelling or tenderness. Normal range of motion. Cervical back: Normal range of motion. No rigidity. Skin:     General: Skin is warm. Findings: No rash. Neurological:      General: No focal deficit present. Mental Status: She is alert and oriented to person, place, and time. Psychiatric:         Mood and Affect: Mood normal.        Procedures    Results for orders placed or performed during the hospital encounter of 09/20/22   CT ABDOMEN PELVIS W IV CONTRAST Additional Contrast? None    Narrative    CT OF THE ABDOMEN AND PELVIS WITH CONTRAST. CLINICAL INDICATION: Right lower quadrant abdominal pain    PROCEDURE: Serial thin section axial images obtained from the lung bases through  the proximal femurs following the administration of 100 cc of Isovue 370  intravenous contrast.  Radiation dose reduction techniques were used for this  study.  Our CT scanners use one or all of the following: Automated exposure  control, adjusted of the mA and/or kV according to patient size, iterative  reconstruction    COMPARISON: CT abdomen and pelvis dated 8/21/2020    FINDINGS:     CT ABDOMEN: The gallbladder is very distended with a small amount of either  dependent sludge or small gallstones. The gallbladder wall is thin. No  pericholecystic inflammatory fat stranding. The appendix is normal. The bowel is  normal in course, caliber, and wall thickness. The stomach is decompressed. The  pancreas is normal. The liver and spleen are unremarkable. The kidneys are  symmetric in size and contrast enhancement. There is no hydronephrosis. The  adrenal glands are normal. There is no ascites or adenopathy. CT PELVIS: The bladder is very distended with smooth thin walls. The uterus and  adnexa are unremarkable. The rectum is normal. No free fluid accumulating  filling the pelvis. There is no inguinal hernia. No aggressive osseous lesions identified. Impression    1. Hydropic gallbladder with thin walls. Dependent sludge or small stones noted  in the gallbladder fundus. If there is clinical concern for cholecystitis,  consider further evaluation with right upper quadrant abdominal ultrasound. 2. Otherwise no acute abdominal or pelvic abnormality. The appendix is normal.       US ABDOMEN LIMITED Specify organ? GALLBLADDER    Narrative    Right upper quadrant abdominal ultrasound. CLINICAL INDICATION: Cholelithiasis    PROCEDURE: Realtime grayscale and color Doppler evaluation of the right upper  abdominal quadrant. COMPARISON: CT of the abdomen and pelvis dated 9/20/2022    FINDINGS: The liver is normal in size and echogenicity without focal lesion the  gallbladder is distended and near completely full of gallstones. The gallbladder  wall is thin at 2 mm. A negative sonographic [place] Born sign is reported. The common  bile duct measures 3.7 mm. The imaged portion the pancreas is unremarkable. The  right kidney is normal in size measuring 10.8 cm. There is no hydronephrosis. Echogenicity is normal. The aorta and IVC are patent and unremarkable. Impression    Distended gallbladder with cholelithiasis.  The gallbladder is near  completely filled with stones.      Urinalysis w rflx microscopic   Result Value Ref Range    Color, UA YELLOW      Appearance SLIGHTLY CLOUDY      Specific [place], UA 1.015 1.001 - 1.023      pH, Urine 8.0 5.0 - 9.0      Protein, UA Negative NEG mg/dL    Glucose, UA Negative mg/dL    Ketones, Urine Negative NEG mg/dL    Bilirubin Urine Negative NEG      Blood, Urine Negative NEG      Urobilinogen, Urine 0.2 0.2 - 1.0 EU/dL    Nitrite, Urine Negative NEG      Leukocyte Esterase, Urine Negative NEG     CBC with Auto Differential   Result Value Ref Range    WBC 6.4 4.3 - 11.1 K/uL    RBC 3.98 (L) 4.05 - 5.20 M/uL    Hemoglobin 12.6 11.7 - 15.4 g/dL    Hematocrit 37.6 35.8 - 46.3 %    MCV 94.5 79.6 - 97.8 FL    MCH 31.7 26.1 - 32.9 PG    MCHC 33.5 31.4 - 35.0 g/dL    RDW 13.1 11.9 - 14.6 %    Platelets 317 950 - 913 K/uL    MPV 9.0 (L) 9.4 - 12.3 FL    nRBC 0.00 0.0 - 0.2 K/uL    Differential Type AUTOMATED      Seg Neutrophils 58 43 - 78 %    Lymphocytes 27 13 - 44 %    Monocytes 13 (H) 4.0 - 12.0 %    Eosinophils % 2 0.5 - 7.8 %    Basophils 1 0.0 - 2.0 %    Immature Granulocytes 0 0.0 - 5.0 %    Segs Absolute 3.7 1.7 - 8.2 K/UL    Absolute Lymph # 1.7 0.5 - 4.6 K/UL    Absolute Mono # 0.8 0.1 - 1.3 K/UL    Absolute Eos # 0.1 0.0 - 0.8 K/UL    Basophils Absolute 0.0 0.0 - 0.2 K/UL    Absolute Immature Granulocyte 0.0 0.0 - 0.5 K/UL   Comprehensive Metabolic Panel   Result Value Ref Range    Sodium 138 136 - 145 mmol/L    Potassium 4.3 3.5 - 5.1 mmol/L    Chloride 100 98 - 107 mmol/L    CO2 28 21 - 32 mmol/L    Anion Gap 10 7.0 - 16.0 mmol/L    Glucose 101 (H) 65 - 100 mg/dL    BUN 16 7.0 - 18.0 MG/DL    Creatinine 0.74 0.6 - 1.0 MG/DL    GFR African American >97 >60 ml/min/1.73m2    GFR Non- >60 >60 ml/min/1.73m2    Calcium 9.8 8.3 - 10.4 MG/DL    Total Bilirubin 0.5 0.2 - 1.1 MG/DL    ALT 28 13.0 - 61.0 U/L    AST 28 15 - 37 U/L    Alk Phosphatase 105 45.0 - 117.0 U/L    Total Protein 6.9 6.4 - 8.2 g/dL    Albumin 4.6 3.2 - 4.6 g/dL    Globulin 2.3 2.3 - 3.5 g/dL    Albumin/Globulin Ratio 2.0     Lactic Acid   Result Value Ref Range    Lactic Acid 1.10 0.4 - 2.0 mmol/L        US ABDOMEN LIMITED Specify organ? GALLBLADDER   Final Result   Distended gallbladder with cholelithiasis. The gallbladder is near   completely filled with stones. CT ABDOMEN PELVIS W IV CONTRAST Additional Contrast? None   Final Result   1. Hydropic gallbladder with thin walls. Dependent sludge or small stones noted   in the gallbladder fundus. If there is clinical concern for cholecystitis,   consider further evaluation with right upper quadrant abdominal ultrasound. 2. Otherwise no acute abdominal or pelvic abnormality. The appendix is normal.                                Voice dictation software was used during the making of this note. This software is not perfect and grammatical and other typographical errors may be present. This note has not been completely proofread for errors.         Nabil Prakash DO  09/20/22 1544

## 2022-09-20 NOTE — ED TRIAGE NOTES
Patient reports groin/inguinal pain starting this morning 07:40. Patient states \" I couldn't do anything. My Doctor's office didn't answer, so I came here. \"

## 2022-09-21 ENCOUNTER — ANESTHESIA EVENT (OUTPATIENT)
Dept: SURGERY | Age: 82
DRG: 417 | End: 2022-09-21
Payer: MEDICARE

## 2022-09-21 ENCOUNTER — ANESTHESIA (OUTPATIENT)
Dept: SURGERY | Age: 82
DRG: 417 | End: 2022-09-21
Payer: MEDICARE

## 2022-09-21 PROBLEM — K80.12 CALCULUS OF GALLBLADDER WITH ACUTE ON CHRONIC CHOLECYSTITIS WITHOUT OBSTRUCTION: Status: ACTIVE | Noted: 2022-09-21

## 2022-09-21 LAB
ANION GAP SERPL CALC-SCNC: 7 MMOL/L (ref 4–13)
BASOPHILS # BLD: 0 K/UL (ref 0–0.2)
BASOPHILS NFR BLD: 0 % (ref 0–2)
BUN SERPL-MCNC: 14 MG/DL (ref 8–23)
CALCIUM SERPL-MCNC: 8.5 MG/DL (ref 8.3–10.4)
CHLORIDE SERPL-SCNC: 107 MMOL/L (ref 101–110)
CO2 SERPL-SCNC: 25 MMOL/L (ref 21–32)
CREAT SERPL-MCNC: 0.8 MG/DL (ref 0.6–1)
DIFFERENTIAL METHOD BLD: ABNORMAL
EOSINOPHIL # BLD: 0 K/UL (ref 0–0.8)
EOSINOPHIL NFR BLD: 0 % (ref 0.5–7.8)
ERYTHROCYTE [DISTWIDTH] IN BLOOD BY AUTOMATED COUNT: 13.3 % (ref 11.9–14.6)
GLUCOSE SERPL-MCNC: 118 MG/DL (ref 65–100)
HCT VFR BLD AUTO: 34.5 % (ref 35.8–46.3)
HGB BLD-MCNC: 11.5 G/DL (ref 11.7–15.4)
IMM GRANULOCYTES # BLD AUTO: 0 K/UL (ref 0–0.5)
IMM GRANULOCYTES NFR BLD AUTO: 0 % (ref 0–5)
LYMPHOCYTES # BLD: 0.9 K/UL (ref 0.5–4.6)
LYMPHOCYTES NFR BLD: 10 % (ref 13–44)
MCH RBC QN AUTO: 32.4 PG (ref 26.1–32.9)
MCHC RBC AUTO-ENTMCNC: 33.3 G/DL (ref 31.4–35)
MCV RBC AUTO: 97.2 FL (ref 79.6–97.8)
MONOCYTES # BLD: 0.8 K/UL (ref 0.1–1.3)
MONOCYTES NFR BLD: 8 % (ref 4–12)
NEUTS SEG # BLD: 7.5 K/UL (ref 1.7–8.2)
NEUTS SEG NFR BLD: 82 % (ref 43–78)
NRBC # BLD: 0 K/UL (ref 0–0.2)
PLATELET # BLD AUTO: 270 K/UL (ref 150–450)
PMV BLD AUTO: 9.9 FL (ref 9.4–12.3)
POTASSIUM SERPL-SCNC: 4 MMOL/L (ref 3.5–5.1)
RBC # BLD AUTO: 3.55 M/UL (ref 4.05–5.2)
SODIUM SERPL-SCNC: 139 MMOL/L (ref 136–145)
WBC # BLD AUTO: 9.2 K/UL (ref 4.3–11.1)

## 2022-09-21 PROCEDURE — 2500000003 HC RX 250 WO HCPCS: Performed by: SURGERY

## 2022-09-21 PROCEDURE — 2580000003 HC RX 258: Performed by: ANESTHESIOLOGY

## 2022-09-21 PROCEDURE — 2580000003 HC RX 258: Performed by: INTERNAL MEDICINE

## 2022-09-21 PROCEDURE — 85025 COMPLETE CBC W/AUTO DIFF WBC: CPT

## 2022-09-21 PROCEDURE — 2500000003 HC RX 250 WO HCPCS: Performed by: ANESTHESIOLOGY

## 2022-09-21 PROCEDURE — 6360000002 HC RX W HCPCS: Performed by: INTERNAL MEDICINE

## 2022-09-21 PROCEDURE — BF50200 OTHER IMAGING OF BILE DUCTS USING FLUORESCING AGENT, INDOCYANINE GREEN DYE, INTRAOPERATIVE: ICD-10-PCS | Performed by: SURGERY

## 2022-09-21 PROCEDURE — 2709999900 HC NON-CHARGEABLE SUPPLY: Performed by: SURGERY

## 2022-09-21 PROCEDURE — 47563 LAPARO CHOLECYSTECTOMY/GRAPH: CPT | Performed by: SURGERY

## 2022-09-21 PROCEDURE — 2500000003 HC RX 250 WO HCPCS: Performed by: NURSE ANESTHETIST, CERTIFIED REGISTERED

## 2022-09-21 PROCEDURE — 3700000000 HC ANESTHESIA ATTENDED CARE: Performed by: SURGERY

## 2022-09-21 PROCEDURE — 2580000003 HC RX 258: Performed by: STUDENT IN AN ORGANIZED HEALTH CARE EDUCATION/TRAINING PROGRAM

## 2022-09-21 PROCEDURE — 99223 1ST HOSP IP/OBS HIGH 75: CPT | Performed by: SURGERY

## 2022-09-21 PROCEDURE — G0378 HOSPITAL OBSERVATION PER HR: HCPCS

## 2022-09-21 PROCEDURE — 6360000002 HC RX W HCPCS: Performed by: SURGERY

## 2022-09-21 PROCEDURE — 3600000014 HC SURGERY LEVEL 4 ADDTL 15MIN: Performed by: SURGERY

## 2022-09-21 PROCEDURE — 2700000000 HC OXYGEN THERAPY PER DAY

## 2022-09-21 PROCEDURE — 7100000001 HC PACU RECOVERY - ADDTL 15 MIN: Performed by: SURGERY

## 2022-09-21 PROCEDURE — 80048 BASIC METABOLIC PNL TOTAL CA: CPT

## 2022-09-21 PROCEDURE — 7100000000 HC PACU RECOVERY - FIRST 15 MIN: Performed by: SURGERY

## 2022-09-21 PROCEDURE — 0FT44ZZ RESECTION OF GALLBLADDER, PERCUTANEOUS ENDOSCOPIC APPROACH: ICD-10-PCS | Performed by: SURGERY

## 2022-09-21 PROCEDURE — 36415 COLL VENOUS BLD VENIPUNCTURE: CPT

## 2022-09-21 PROCEDURE — 3700000001 HC ADD 15 MINUTES (ANESTHESIA): Performed by: SURGERY

## 2022-09-21 PROCEDURE — 64488 TAP BLOCK BI INJECTION: CPT | Performed by: ANESTHESIOLOGY

## 2022-09-21 PROCEDURE — 3600000004 HC SURGERY LEVEL 4 BASE: Performed by: SURGERY

## 2022-09-21 PROCEDURE — 6370000000 HC RX 637 (ALT 250 FOR IP): Performed by: ANESTHESIOLOGY

## 2022-09-21 PROCEDURE — 2720000010 HC SURG SUPPLY STERILE: Performed by: SURGERY

## 2022-09-21 PROCEDURE — 6360000002 HC RX W HCPCS: Performed by: ANESTHESIOLOGY

## 2022-09-21 PROCEDURE — 88304 TISSUE EXAM BY PATHOLOGIST: CPT

## 2022-09-21 PROCEDURE — 6360000002 HC RX W HCPCS: Performed by: NURSE ANESTHETIST, CERTIFIED REGISTERED

## 2022-09-21 PROCEDURE — 6370000000 HC RX 637 (ALT 250 FOR IP): Performed by: INTERNAL MEDICINE

## 2022-09-21 PROCEDURE — 6370000000 HC RX 637 (ALT 250 FOR IP): Performed by: NURSE PRACTITIONER

## 2022-09-21 PROCEDURE — 94760 N-INVAS EAR/PLS OXIMETRY 1: CPT

## 2022-09-21 RX ORDER — SODIUM CHLORIDE, SODIUM LACTATE, POTASSIUM CHLORIDE, CALCIUM CHLORIDE 600; 310; 30; 20 MG/100ML; MG/100ML; MG/100ML; MG/100ML
INJECTION, SOLUTION INTRAVENOUS CONTINUOUS
Status: DISCONTINUED | OUTPATIENT
Start: 2022-09-21 | End: 2022-09-22

## 2022-09-21 RX ORDER — FENTANYL CITRATE 50 UG/ML
INJECTION, SOLUTION INTRAMUSCULAR; INTRAVENOUS PRN
Status: DISCONTINUED | OUTPATIENT
Start: 2022-09-21 | End: 2022-09-21 | Stop reason: SDUPTHER

## 2022-09-21 RX ORDER — BUPIVACAINE HYDROCHLORIDE 2.5 MG/ML
INJECTION, SOLUTION EPIDURAL; INFILTRATION; INTRACAUDAL PRN
Status: DISCONTINUED | OUTPATIENT
Start: 2022-09-21 | End: 2022-09-21 | Stop reason: HOSPADM

## 2022-09-21 RX ORDER — INDOCYANINE GREEN AND WATER 25 MG
2.5 KIT INJECTION
Status: COMPLETED | OUTPATIENT
Start: 2022-09-21 | End: 2022-09-21

## 2022-09-21 RX ORDER — OXYCODONE HYDROCHLORIDE 5 MG/1
10 TABLET ORAL PRN
Status: ACTIVE | OUTPATIENT
Start: 2022-09-21 | End: 2022-09-21

## 2022-09-21 RX ORDER — LABETALOL HYDROCHLORIDE 5 MG/ML
5 INJECTION, SOLUTION INTRAVENOUS ONCE
Status: COMPLETED | OUTPATIENT
Start: 2022-09-21 | End: 2022-09-21

## 2022-09-21 RX ORDER — DIMETHICONE, CAMPHOR (SYNTHETIC), MENTHOL, AND PHENOL 1.1; .5; .625; .5 G/100G; G/100G; G/100G; G/100G
OINTMENT TOPICAL PRN
Status: DISCONTINUED | OUTPATIENT
Start: 2022-09-21 | End: 2022-09-26 | Stop reason: HOSPADM

## 2022-09-21 RX ORDER — ONDANSETRON 2 MG/ML
4 INJECTION INTRAMUSCULAR; INTRAVENOUS
Status: ACTIVE | OUTPATIENT
Start: 2022-09-21 | End: 2022-09-21

## 2022-09-21 RX ORDER — ENOXAPARIN SODIUM 100 MG/ML
40 INJECTION SUBCUTANEOUS DAILY
Status: CANCELLED | OUTPATIENT
Start: 2022-09-22

## 2022-09-21 RX ORDER — DEXTROSE AND SODIUM CHLORIDE 5; .45 G/100ML; G/100ML
INJECTION, SOLUTION INTRAVENOUS CONTINUOUS
Status: DISCONTINUED | OUTPATIENT
Start: 2022-09-21 | End: 2022-09-22

## 2022-09-21 RX ORDER — MIDAZOLAM HYDROCHLORIDE 2 MG/2ML
2 INJECTION, SOLUTION INTRAMUSCULAR; INTRAVENOUS
Status: DISCONTINUED | OUTPATIENT
Start: 2022-09-21 | End: 2022-09-21 | Stop reason: HOSPADM

## 2022-09-21 RX ORDER — DIPHENHYDRAMINE HYDROCHLORIDE 50 MG/ML
12.5 INJECTION INTRAMUSCULAR; INTRAVENOUS
Status: ACTIVE | OUTPATIENT
Start: 2022-09-21 | End: 2022-09-21

## 2022-09-21 RX ORDER — HYDROMORPHONE HYDROCHLORIDE 2 MG/ML
0.5 INJECTION, SOLUTION INTRAMUSCULAR; INTRAVENOUS; SUBCUTANEOUS EVERY 10 MIN PRN
Status: DISCONTINUED | OUTPATIENT
Start: 2022-09-21 | End: 2022-09-22

## 2022-09-21 RX ORDER — SODIUM CHLORIDE 0.9 % (FLUSH) 0.9 %
5-40 SYRINGE (ML) INJECTION EVERY 12 HOURS SCHEDULED
Status: DISCONTINUED | OUTPATIENT
Start: 2022-09-21 | End: 2022-09-21 | Stop reason: HOSPADM

## 2022-09-21 RX ORDER — HYDROMORPHONE HYDROCHLORIDE 1 MG/ML
0.5 INJECTION, SOLUTION INTRAMUSCULAR; INTRAVENOUS; SUBCUTANEOUS ONCE
Status: COMPLETED | OUTPATIENT
Start: 2022-09-22 | End: 2022-09-22

## 2022-09-21 RX ORDER — EPHEDRINE SULFATE/0.9% NACL/PF 50 MG/5 ML
SYRINGE (ML) INTRAVENOUS PRN
Status: DISCONTINUED | OUTPATIENT
Start: 2022-09-21 | End: 2022-09-21 | Stop reason: SDUPTHER

## 2022-09-21 RX ORDER — PROPOFOL 10 MG/ML
INJECTION, EMULSION INTRAVENOUS PRN
Status: DISCONTINUED | OUTPATIENT
Start: 2022-09-21 | End: 2022-09-21 | Stop reason: SDUPTHER

## 2022-09-21 RX ORDER — LIDOCAINE HYDROCHLORIDE 20 MG/ML
INJECTION, SOLUTION EPIDURAL; INFILTRATION; INTRACAUDAL; PERINEURAL PRN
Status: DISCONTINUED | OUTPATIENT
Start: 2022-09-21 | End: 2022-09-21 | Stop reason: SDUPTHER

## 2022-09-21 RX ORDER — FENTANYL CITRATE 50 UG/ML
100 INJECTION, SOLUTION INTRAMUSCULAR; INTRAVENOUS
Status: DISCONTINUED | OUTPATIENT
Start: 2022-09-21 | End: 2022-09-21 | Stop reason: HOSPADM

## 2022-09-21 RX ORDER — ROPIVACAINE HYDROCHLORIDE 5 MG/ML
INJECTION, SOLUTION EPIDURAL; INFILTRATION; PERINEURAL
Status: COMPLETED | OUTPATIENT
Start: 2022-09-21 | End: 2022-09-21

## 2022-09-21 RX ORDER — DEXAMETHASONE SODIUM PHOSPHATE 4 MG/ML
INJECTION, SOLUTION INTRA-ARTICULAR; INTRALESIONAL; INTRAMUSCULAR; INTRAVENOUS; SOFT TISSUE PRN
Status: DISCONTINUED | OUTPATIENT
Start: 2022-09-21 | End: 2022-09-21 | Stop reason: SDUPTHER

## 2022-09-21 RX ORDER — OXYCODONE HYDROCHLORIDE 5 MG/1
5 TABLET ORAL PRN
Status: ACTIVE | OUTPATIENT
Start: 2022-09-21 | End: 2022-09-21

## 2022-09-21 RX ORDER — ROCURONIUM BROMIDE 10 MG/ML
INJECTION, SOLUTION INTRAVENOUS PRN
Status: DISCONTINUED | OUTPATIENT
Start: 2022-09-21 | End: 2022-09-21 | Stop reason: SDUPTHER

## 2022-09-21 RX ORDER — ACETAMINOPHEN 500 MG
1000 TABLET ORAL ONCE
Status: COMPLETED | OUTPATIENT
Start: 2022-09-21 | End: 2022-09-21

## 2022-09-21 RX ORDER — DEXTROSE AND SODIUM CHLORIDE 5; .9 G/100ML; G/100ML
INJECTION, SOLUTION INTRAVENOUS CONTINUOUS
Status: DISCONTINUED | OUTPATIENT
Start: 2022-09-21 | End: 2022-09-21

## 2022-09-21 RX ORDER — SODIUM CHLORIDE, SODIUM LACTATE, POTASSIUM CHLORIDE, CALCIUM CHLORIDE 600; 310; 30; 20 MG/100ML; MG/100ML; MG/100ML; MG/100ML
INJECTION, SOLUTION INTRAVENOUS CONTINUOUS
Status: DISCONTINUED | OUTPATIENT
Start: 2022-09-21 | End: 2022-09-21 | Stop reason: HOSPADM

## 2022-09-21 RX ORDER — SODIUM CHLORIDE 0.9 % (FLUSH) 0.9 %
5-40 SYRINGE (ML) INJECTION PRN
Status: DISCONTINUED | OUTPATIENT
Start: 2022-09-21 | End: 2022-09-21 | Stop reason: HOSPADM

## 2022-09-21 RX ORDER — ONDANSETRON 2 MG/ML
INJECTION INTRAMUSCULAR; INTRAVENOUS PRN
Status: DISCONTINUED | OUTPATIENT
Start: 2022-09-21 | End: 2022-09-21 | Stop reason: SDUPTHER

## 2022-09-21 RX ORDER — HYDROMORPHONE HYDROCHLORIDE 2 MG/ML
0.25 INJECTION, SOLUTION INTRAMUSCULAR; INTRAVENOUS; SUBCUTANEOUS EVERY 5 MIN PRN
Status: DISCONTINUED | OUTPATIENT
Start: 2022-09-21 | End: 2022-09-22

## 2022-09-21 RX ADMIN — ONDANSETRON 4 MG: 2 INJECTION INTRAMUSCULAR; INTRAVENOUS at 19:25

## 2022-09-21 RX ADMIN — LIDOCAINE HYDROCHLORIDE 100 MG: 20 INJECTION, SOLUTION EPIDURAL; INFILTRATION; INTRACAUDAL; PERINEURAL at 19:14

## 2022-09-21 RX ADMIN — SODIUM CHLORIDE, SODIUM LACTATE, POTASSIUM CHLORIDE, AND CALCIUM CHLORIDE: 600; 310; 30; 20 INJECTION, SOLUTION INTRAVENOUS at 18:47

## 2022-09-21 RX ADMIN — SODIUM CHLORIDE, PRESERVATIVE FREE 5 ML: 5 INJECTION INTRAVENOUS at 22:21

## 2022-09-21 RX ADMIN — DOCUSATE SODIUM 100 MG: 100 CAPSULE, LIQUID FILLED ORAL at 08:40

## 2022-09-21 RX ADMIN — FENTANYL CITRATE 50 MCG: 50 INJECTION, SOLUTION INTRAMUSCULAR; INTRAVENOUS at 20:21

## 2022-09-21 RX ADMIN — Medication 15 MG: at 19:22

## 2022-09-21 RX ADMIN — PANTOPRAZOLE SODIUM 40 MG: 40 TABLET, DELAYED RELEASE ORAL at 06:08

## 2022-09-21 RX ADMIN — SUGAMMADEX 200 MG: 100 INJECTION, SOLUTION INTRAVENOUS at 21:03

## 2022-09-21 RX ADMIN — INDOCYANINE GREEN AND WATER 2.5 MG: KIT at 18:51

## 2022-09-21 RX ADMIN — FENTANYL CITRATE 50 MCG: 50 INJECTION, SOLUTION INTRAMUSCULAR; INTRAVENOUS at 19:41

## 2022-09-21 RX ADMIN — FENTANYL CITRATE 50 MCG: 50 INJECTION, SOLUTION INTRAMUSCULAR; INTRAVENOUS at 19:14

## 2022-09-21 RX ADMIN — FENTANYL CITRATE 50 MCG: 50 INJECTION, SOLUTION INTRAMUSCULAR; INTRAVENOUS at 20:11

## 2022-09-21 RX ADMIN — ROCURONIUM BROMIDE 10 MG: 50 INJECTION, SOLUTION INTRAVENOUS at 20:21

## 2022-09-21 RX ADMIN — LABETALOL HYDROCHLORIDE 5 MG: 5 INJECTION INTRAVENOUS at 21:38

## 2022-09-21 RX ADMIN — ACETAMINOPHEN 1000 MG: 500 TABLET, FILM COATED ORAL at 18:46

## 2022-09-21 RX ADMIN — PROPOFOL 150 MG: 10 INJECTION, EMULSION INTRAVENOUS at 19:14

## 2022-09-21 RX ADMIN — DEXAMETHASONE SODIUM PHOSPHATE 4 MG: 4 INJECTION, SOLUTION INTRAMUSCULAR; INTRAVENOUS at 19:25

## 2022-09-21 RX ADMIN — ROPIVACAINE HYDROCHLORIDE 60 ML: 5 INJECTION, SOLUTION EPIDURAL; INFILTRATION; PERINEURAL at 20:59

## 2022-09-21 RX ADMIN — SODIUM CHLORIDE, POTASSIUM CHLORIDE, SODIUM LACTATE AND CALCIUM CHLORIDE: 600; 310; 30; 20 INJECTION, SOLUTION INTRAVENOUS at 06:01

## 2022-09-21 RX ADMIN — ONDANSETRON 4 MG: 2 INJECTION INTRAMUSCULAR; INTRAVENOUS at 00:31

## 2022-09-21 RX ADMIN — DEXTROSE AND SODIUM CHLORIDE: 5; 450 INJECTION, SOLUTION INTRAVENOUS at 10:19

## 2022-09-21 RX ADMIN — ROCURONIUM BROMIDE 40 MG: 50 INJECTION, SOLUTION INTRAVENOUS at 19:15

## 2022-09-21 RX ADMIN — SODIUM CHLORIDE, SODIUM LACTATE, POTASSIUM CHLORIDE, AND CALCIUM CHLORIDE: 600; 310; 30; 20 INJECTION, SOLUTION INTRAVENOUS at 20:53

## 2022-09-21 RX ADMIN — SODIUM CHLORIDE, PRESERVATIVE FREE 10 ML: 5 INJECTION INTRAVENOUS at 08:44

## 2022-09-21 RX ADMIN — Medication 2000 MG: at 19:20

## 2022-09-21 RX ADMIN — SODIUM CHLORIDE, POTASSIUM CHLORIDE, SODIUM LACTATE AND CALCIUM CHLORIDE: 600; 310; 30; 20 INJECTION, SOLUTION INTRAVENOUS at 22:21

## 2022-09-21 RX ADMIN — Medication 15 MG: at 19:25

## 2022-09-21 RX ADMIN — MORPHINE SULFATE 2 MG: 2 INJECTION, SOLUTION INTRAMUSCULAR; INTRAVENOUS at 22:20

## 2022-09-21 RX ADMIN — Medication: at 08:40

## 2022-09-21 ASSESSMENT — PAIN SCALES - GENERAL
PAINLEVEL_OUTOF10: 8
PAINLEVEL_OUTOF10: 10
PAINLEVEL_OUTOF10: 8
PAINLEVEL_OUTOF10: 9

## 2022-09-21 ASSESSMENT — PAIN DESCRIPTION - LOCATION: LOCATION: ABDOMEN

## 2022-09-21 ASSESSMENT — PAIN - FUNCTIONAL ASSESSMENT
PAIN_FUNCTIONAL_ASSESSMENT: NONE - DENIES PAIN

## 2022-09-21 ASSESSMENT — PAIN DESCRIPTION - ORIENTATION: ORIENTATION: PROXIMAL

## 2022-09-21 ASSESSMENT — ENCOUNTER SYMPTOMS: SHORTNESS OF BREATH: 0

## 2022-09-21 NOTE — PROGRESS NOTES
Pt remains NPO except with sips of meds. Pt is alert and oriented times 3. Pt remains on room air with even and unlabored respirations.

## 2022-09-21 NOTE — PROGRESS NOTES
Hospitalist Progress Note   Admit Date:  2022  6:39 PM   Name:  Margarita Kim   Age:  81 y.o. Sex:  female  :  1940   MRN:  [id number]   Room:  [facility]/    Presenting Complaint: No chief complaint on file. Reason(s) for Admission: Intractable pain [R52]     Hospital Course:   Margarita Kim is a 81 y.o. female with medical history of chronic constipation, remote peptic ulcer duodenal  and significant endometriosis. She has had a cervical laminectomy tonsillectomy and tubal ligation in the past but relatively benign medical history for her age. Who presented with 1-1/2-year history of postprandial abdominal discomfort-recently over the past several weeks increased abdominal discomfort and particularly right lower quadrant pain which progressed to the point where she sought care in an emergency department today at [facility] facility. Initially felt to be urinary source but urinalysis negative and CT abdomen and pelvis revealed sludge versus other and a very thin-walled hydropic gallbladder which prompted ultrasound which showed that the gallbladder is nearly completely filled with stones. Patient had intractable pain and would not be discharged home as recommended because of intractable pain requiring IV narcotics and [facility] requested admission here for intractable pain. Surgery was notified and they recommended outpatient follow-up in the office. N.p.o. status IV hydration. CT imaging which did/does not show her gallstones that are well seen and fill her GB on US. Her GB is enlarged with a thin wall. Subjective & 24hr Events (22): Pt is seen and examined at the bedside. Pt states her abdominal pain is improving with pain medication. She is complaining of nausea. Friends at the bedside. Assessment & Plan:      Cholelithiasis   Secondary to hydrops gallbladder with stones occupying nearly entire gallbladder and thin walls.     Pain management with Oxycodone moderate pain IV narcotics for severe pain  C/w  IV hydration. Npo  Started on D5 +ns   Plan for laparoscopic, possible open cholecystectomy on 9/21  Surgical consulted and appreciate recommendations     Chronic constipation--MiraLAX twice daily stool softener-monitor for worsening with narcotics     History of duodenal ulcer disease remote. Continue  PPI daily. Anticipated discharge needs:   Pending clinical course       Diet:  Diet NPO Exceptions are: Sips of Water with Meds, Sips of Clear Liquids  DVT PPx: SCD for anticipating surgery   Code status: Full Code    Hospital Problems:  Principal Problem:    Intractable pain  Active Problems:    Calculus of gallbladder with acute on chronic cholecystitis without obstruction  Resolved Problems:    * No resolved hospital problems. *      Objective:   Patient Vitals for the past 24 hrs:   Temp Pulse Resp BP SpO2   09/21/22 1440 98.6 °F (37 °C) 54 18 (!) 106/46 99 %   09/21/22 1059 99 °F (37.2 °C) 58 18 (!) 110/45 100 %   09/21/22 0848 -- -- -- -- 98 %   09/21/22 0721 98.1 °F (36.7 °C) 61 18 (!) 118/51 99 %   09/21/22 0315 -- -- -- (!) 104/49 --   09/21/22 0259 98.2 °F (36.8 °C) 58 16 (!) 94/41 97 %   09/20/22 2311 98.1 °F (36.7 °C) 51 14 (!) 101/41 96 %   09/20/22 2117 97.5 °F (36.4 °C) 53 14 (!) 105/48 --   09/20/22 2108 -- 53 -- (!) 105/48 --   09/20/22 1958 97.5 °F (36.4 °C) 53 14 (!) 105/48 100 %       Oxygen Therapy  SpO2: 99 %  O2 Device: None (Room air)    Estimated body mass index is 20.67 kg/m² as calculated from the following:    Height as of this encounter: 5' 7\" (1.702 m). Weight as of this encounter: 132 lb (59.9 kg). Intake/Output Summary (Last 24 hours) at 9/21/2022 1542  Last data filed at 9/21/2022 1038  Gross per 24 hour   Intake 1365 ml   Output --   Net 1365 ml         Physical Exam:     Blood pressure (!) 106/46, pulse 54, temperature 98.6 °F (37 °C), temperature source Oral, resp.  rate 18, height 5' 7\" (1.702 m), weight 132 lb (59.9 kg), SpO2 99 %. General:    Well nourished. Head:  Normocephalic, atraumatic  Eyes:  Sclerae appear normal.  Pupils equally round. ENT:  Nares appear normal, no drainage. Moist oral mucosa  Neck:  No restricted ROM. Trachea midline   CV:   RRR. No m/r/g. No jugular venous distension. Lungs:   CTAB. No wheezing, rhonchi, or rales. Symmetric expansion. Abdomen: Bowel sounds present. Soft, Abdominal tenderness is present, non distended. Extremities: No cyanosis or clubbing. No edema  Skin:     No rashes and normal coloration. Warm and dry. Neuro:  CN II-XII grossly intact. Sensation intact. A&Ox3  Psych:  Normal mood and affect.       I have personally reviewed labs and tests showing:  Recent Labs:  Recent Results (from the past 48 hour(s))   Urinalysis w rflx microscopic    Collection Time: 09/20/22  9:48 AM   Result Value Ref Range    Color, UA YELLOW      Appearance SLIGHTLY CLOUDY      Specific [place]lap, UA 1.015 1.001 - 1.023      pH, Urine 8.0 5.0 - 9.0      Protein, UA Negative NEG mg/dL    Glucose, UA Negative mg/dL    Ketones, Urine Negative NEG mg/dL    Bilirubin Urine Negative NEG      Blood, Urine Negative NEG      Urobilinogen, Urine 0.2 0.2 - 1.0 EU/dL    Nitrite, Urine Negative NEG      Leukocyte Esterase, Urine Negative NEG     CBC with Auto Differential    Collection Time: 09/20/22  9:48 AM   Result Value Ref Range    WBC 6.4 4.3 - 11.1 K/uL    RBC 3.98 (L) 4.05 - 5.20 M/uL    Hemoglobin 12.6 11.7 - 15.4 g/dL    Hematocrit 37.6 35.8 - 46.3 %    MCV 94.5 79.6 - 97.8 FL    MCH 31.7 26.1 - 32.9 PG    MCHC 33.5 31.4 - 35.0 g/dL    RDW 13.1 11.9 - 14.6 %    Platelets 444 035 - 514 K/uL    MPV 9.0 (L) 9.4 - 12.3 FL    nRBC 0.00 0.0 - 0.2 K/uL    Differential Type AUTOMATED      Seg Neutrophils 58 43 - 78 %    Lymphocytes 27 13 - 44 %    Monocytes 13 (H) 4.0 - 12.0 %    Eosinophils % 2 0.5 - 7.8 %    Basophils 1 0.0 - 2.0 %    Immature Granulocytes 0 0.0 - 5.0 % Segs Absolute 3.7 1.7 - 8.2 K/UL    Absolute Lymph # 1.7 0.5 - 4.6 K/UL    Absolute Mono # 0.8 0.1 - 1.3 K/UL    Absolute Eos # 0.1 0.0 - 0.8 K/UL    Basophils Absolute 0.0 0.0 - 0.2 K/UL    Absolute Immature Granulocyte 0.0 0.0 - 0.5 K/UL   Comprehensive Metabolic Panel    Collection Time: 09/20/22  9:48 AM   Result Value Ref Range    Sodium 138 136 - 145 mmol/L    Potassium 4.3 3.5 - 5.1 mmol/L    Chloride 100 98 - 107 mmol/L    CO2 28 21 - 32 mmol/L    Anion Gap 10 7.0 - 16.0 mmol/L    Glucose 101 (H) 65 - 100 mg/dL    BUN 16 7.0 - 18.0 MG/DL    Creatinine 0.74 0.6 - 1.0 MG/DL    GFR African American >97 >60 ml/min/1.73m2    GFR Non- >60 >60 ml/min/1.73m2    Calcium 9.8 8.3 - 10.4 MG/DL    Total Bilirubin 0.5 0.2 - 1.1 MG/DL    ALT 28 13.0 - 61.0 U/L    AST 28 15 - 37 U/L    Alk Phosphatase 105 45.0 - 117.0 U/L    Total Protein 6.9 6.4 - 8.2 g/dL    Albumin 4.6 3.2 - 4.6 g/dL    Globulin 2.3 2.3 - 3.5 g/dL    Albumin/Globulin Ratio 2.0     Lactic Acid    Collection Time: 09/20/22  9:48 AM   Result Value Ref Range    Lactic Acid 1.10 0.4 - 2.0 mmol/L   Lipase    Collection Time: 09/20/22  9:48 AM   Result Value Ref Range    Lipase 54 13 - 60 U/L   Basic Metabolic Panel w/ Reflex to MG    Collection Time: 09/21/22  3:44 AM   Result Value Ref Range    Sodium 139 136 - 145 mmol/L    Potassium 4.0 3.5 - 5.1 mmol/L    Chloride 107 101 - 110 mmol/L    CO2 25 21 - 32 mmol/L    Anion Gap 7 4 - 13 mmol/L    Glucose 118 (H) 65 - 100 mg/dL    BUN 14 8 - 23 MG/DL    Creatinine 0.80 0.6 - 1.0 MG/DL    GFR African American >60 >60 ml/min/1.73m2    GFR Non- >60 >60 ml/min/1.73m2    Calcium 8.5 8.3 - 10.4 MG/DL   CBC with Auto Differential    Collection Time: 09/21/22  3:44 AM   Result Value Ref Range    WBC 9.2 4.3 - 11.1 K/uL    RBC 3.55 (L) 4.05 - 5.2 M/uL    Hemoglobin 11.5 (L) 11.7 - 15.4 g/dL    Hematocrit 34.5 (L) 35.8 - 46.3 %    MCV 97.2 79.6 - 97.8 FL    MCH 32.4 26.1 - 32.9 PG    MCHC 33.3 31.4 - 35.0 g/dL    RDW 13.3 11.9 - 14.6 %    Platelets 621 033 - 772 K/uL    MPV 9.9 9.4 - 12.3 FL    nRBC 0.00 0.0 - 0.2 K/uL    Differential Type AUTOMATED      Seg Neutrophils 82 (H) 43 - 78 %    Lymphocytes 10 (L) 13 - 44 %    Monocytes 8 4.0 - 12.0 %    Eosinophils % 0 (L) 0.5 - 7.8 %    Basophils 0 0.0 - 2.0 %    Immature Granulocytes 0 0.0 - 5.0 %    Segs Absolute 7.5 1.7 - 8.2 K/UL    Absolute Lymph # 0.9 0.5 - 4.6 K/UL    Absolute Mono # 0.8 0.1 - 1.3 K/UL    Absolute Eos # 0.0 0.0 - 0.8 K/UL    Basophils Absolute 0.0 0.0 - 0.2 K/UL    Absolute Immature Granulocyte 0.0 0.0 - 0.5 K/UL       I have personally reviewed imaging studies showing:   Other Studies:  No orders to display       Current Meds:  Current Facility-Administered Medications   Medication Dose Route Frequency    medicated lip ointment (BLISTEX)   Topical PRN    dextrose 5 % and 0.45 % sodium chloride infusion   IntraVENous Continuous    sodium chloride flush 0.9 % injection 5-40 mL  5-40 mL IntraVENous 2 times per day    sodium chloride flush 0.9 % injection 5-40 mL  5-40 mL IntraVENous PRN    0.9 % sodium chloride infusion   IntraVENous PRN    potassium chloride (KLOR-CON M) extended release tablet 40 mEq  40 mEq Oral PRN    Or    potassium bicarb-citric acid (EFFER-K) effervescent tablet 40 mEq  40 mEq Oral PRN    Or    potassium chloride 10 mEq/100 mL IVPB (Peripheral Line)  10 mEq IntraVENous PRN    potassium chloride 10 mEq/100 mL IVPB (Peripheral Line)  10 mEq IntraVENous PRN    magnesium sulfate 2000 mg in 50 mL IVPB premix  2,000 mg IntraVENous PRN    ondansetron (ZOFRAN-ODT) disintegrating tablet 4 mg  4 mg Oral Q8H PRN    Or    ondansetron (ZOFRAN) injection 4 mg  4 mg IntraVENous Q6H PRN    polyethylene glycol (GLYCOLAX) packet 17 g  17 g Oral BID    famotidine (PEPCID) tablet 10 mg  10 mg Oral Daily PRN    aluminum & magnesium hydroxide-simethicone (MAALOX) 200-200-20 MG/5ML suspension 30 mL  30 mL Oral Q6H PRN acetaminophen (TYLENOL) tablet 650 mg  650 mg Oral Q6H PRN    Or    acetaminophen (TYLENOL) suppository 650 mg  650 mg Rectal Q6H PRN    bisacodyl (DULCOLAX) suppository 10 mg  10 mg Rectal Daily PRN    [Held by provider] enoxaparin (LOVENOX) injection 40 mg  40 mg SubCUTAneous Daily    docusate sodium (COLACE) capsule 100 mg  100 mg Oral Daily    pantoprazole (PROTONIX) tablet 40 mg  40 mg Oral QAM AC    oxyCODONE (ROXICODONE) immediate release tablet 5 mg  5 mg Oral Q4H PRN    morphine injection 2 mg  2 mg IntraVENous Q3H PRN       Signed:  Jluis Bacon MD    Part of this note may have been written by using a voice dictation software. The note has been proof read but may still contain some grammatical/other typographical errors.

## 2022-09-21 NOTE — PERIOP NOTE
TRANSFER - IN REPORT:    Verbal report received from Sara Buckner RN on Lukasz Ferrer  being received from [phone] for ordered procedure      Report consisted of patient's Situation, Background, Assessment and   Recommendations(SBAR). Information from the following report(s) MAR, Procedure Verification, and Quality Measures was reviewed with the receiving nurse. Opportunity for questions and clarification was provided. Assessment completed upon patient's arrival to unit and care assumed.

## 2022-09-21 NOTE — CARE COORDINATION
MSN, CM:  spoke with patient this afternoon about discharge planning. Patient lives in own home alone with son Britney living beside patient. Patient is independent with all Adl's and drives herself to all appointments. PT/OT consulted for evaluation and recommendations. Case Management will continue to follow for any discharge needs. 09/21/22 1620   Service Assessment   Patient Orientation Alert and Oriented   Cognition Alert   History Provided By Patient   Primary Caregiver Self   Accompanied By/Relationship [address]   Patient's Healthcare Decision Maker is: Legal Next of Kin  Heriberto Kat - son)   PCP Verified by CM Yes   Last Visit to PCP Within last 3 months   Prior Functional Level Independent in ADLs/IADLs   Can patient return to prior living arrangement Yes   Ability to make needs known: Good   Family able to assist with home care needs: Yes   Would you like for me to discuss the discharge plan with any other family members/significant others, and if so, who? Yes  Heriberto Kat - Son)   Financial Resources Medicare   Social/Functional History   Lives With Alone   Type of [address] One level   Home Access Ramped entrance   [facility] [place] [place] Responsibilities Yes   Ambulation Assistance Independent   Transfer Assistance Independent   Active  Yes   Mode of [place]   Occupation Retired;Part time employment   Type of Occupation Teacher   Discharge Planning   Type of [address] [place] [address] Prior To Admission None   DME Ordered?  No   Potential Assistance Purchasing Medications No   Patient expects to be discharged to: House   One/Two Story Residence One story   History of falls? 0

## 2022-09-21 NOTE — PROGRESS NOTES
Patient transferred to preop at this time via stretcher. On room air. D5 1/2 NS @ 75 ml/hr. No signs of distress.

## 2022-09-21 NOTE — PROGRESS NOTES
TRANSFER - OUT REPORT:    Verbal report given to [facility] on Karl Espinoza  being transferred to pre for ordered procedure       Report consisted of patient's Situation, Background, Assessment and   Recommendations(SBAR). Information from the following report(s) Nurse Handoff Report was reviewed with the receiving nurse. Lines:   Peripheral IV 09/20/22 Left Antecubital (Active)   Site Assessment Clean, dry & intact 09/21/22 0732   Line Status Infusing 09/21/22 0732   Line Care Connections checked and tightened 09/21/22 0335   Phlebitis Assessment No symptoms 09/21/22 0732   Infiltration Assessment 0 09/21/22 0732   Alcohol Cap Used Yes 09/21/22 0335   Dressing Status Clean, dry & intact 09/21/22 0732   Dressing Type Transparent 09/21/22 0732        Opportunity for questions and clarification was provided.

## 2022-09-21 NOTE — PROGRESS NOTES
Received report from [facility]. Patient awake resting in bed. Respirations present. On room air. No signs of distress. AxO x4. No needs expressed. LR @ 100 ml/hr. Bed low and locked. Call light within reach. Will continue to monitor.

## 2022-09-21 NOTE — CONSULTS
[address] [place], [address] [place] [address], [address] [place] [address]  [phone]    Office Note/Consult Note   So Sebastian   MRN: [id number]     : 1940        HPI: So Sebastian is a 81 y.o. female who is seen at request of Dr. Roselyn Walker for intractable pain from cholelithiasis. She has had years of symptoms that could have been biliary colic vs. IBS vs. Chronic constipation. She has h/o endometriosis. She has had CT imaging which did/does not show her gallstones that are well seen and fill her GB on US. Her GB is enlarged with a thin wall. She has several medical issues, but no prior abdominal surgeries. She is currently without pain, but would like to proceed with cholecystectomy. I have personally reviewed images including previous CT which did not describe the enlarged GB present. It is only slightly smaller than current scan.       Past Medical History:   Diagnosis Date    Constipation     Duodenal ulcer     Endometriosis      Past Surgical History:   Procedure Laterality Date    CERVICAL LAMINECTOMY  1998    TONSILLECTOMY      TUBAL LIGATION       Current Facility-Administered Medications   Medication Dose Route Frequency    sodium chloride flush 0.9 % injection 5-40 mL  5-40 mL IntraVENous 2 times per day    sodium chloride flush 0.9 % injection 5-40 mL  5-40 mL IntraVENous PRN    0.9 % sodium chloride infusion   IntraVENous PRN    potassium chloride (KLOR-CON M) extended release tablet 40 mEq  40 mEq Oral PRN    Or    potassium bicarb-citric acid (EFFER-K) effervescent tablet 40 mEq  40 mEq Oral PRN    Or    potassium chloride 10 mEq/100 mL IVPB (Peripheral Line)  10 mEq IntraVENous PRN    potassium chloride 10 mEq/100 mL IVPB (Peripheral Line)  10 mEq IntraVENous PRN    magnesium sulfate 2000 mg in 50 mL IVPB premix  2,000 mg IntraVENous PRN    ondansetron (ZOFRAN-ODT) disintegrating tablet 4 mg  4 mg Oral Q8H PRN    Or ondansetron (ZOFRAN) injection 4 mg  4 mg IntraVENous Q6H PRN    polyethylene glycol (GLYCOLAX) packet 17 g  17 g Oral BID    famotidine (PEPCID) tablet 10 mg  10 mg Oral Daily PRN    aluminum & magnesium hydroxide-simethicone (MAALOX) 200-200-20 MG/5ML suspension 30 mL  30 mL Oral Q6H PRN    acetaminophen (TYLENOL) tablet 650 mg  650 mg Oral Q6H PRN    Or    acetaminophen (TYLENOL) suppository 650 mg  650 mg Rectal Q6H PRN    lactated ringers infusion   IntraVENous Continuous    bisacodyl (DULCOLAX) suppository 10 mg  10 mg Rectal Daily PRN    enoxaparin (LOVENOX) injection 40 mg  40 mg SubCUTAneous Daily    docusate sodium (COLACE) capsule 100 mg  100 mg Oral Daily    pantoprazole (PROTONIX) tablet 40 mg  40 mg Oral QAM AC    oxyCODONE (ROXICODONE) immediate release tablet 5 mg  5 mg Oral Q4H PRN    morphine injection 2 mg  2 mg IntraVENous Q3H PRN     ALLERGIES:  Sulfamethoxazole-trimethoprim and Oxycodone-acetaminophen    Social History     Socioeconomic History    Marital status:    Tobacco Use    Smoking status: Never    Smokeless tobacco: Never   Substance and Sexual Activity    Alcohol use: No    Drug use: No     Social History     Tobacco Use   Smoking Status Never   Smokeless Tobacco Never     Family History   Problem Relation Age of Onset    Diabetes Brother     Diabetes Father     Heart Disease Mother     Heart Disease Brother        ROS: The patient has no difficulty with chest pain or shortness of breath. No fever or chills. The patient denies any personal or family history of abnormal clotting or bleeding. Comprehensive review of systems was otherwise unremarkable except as noted above. Physical Exam:   Constitutional: Alert oriented cooperative patient in no acute distress. BP (!) 104/49 Comment: BP cuff exchanged  Pulse 58   Temp 98.2 °F (36.8 °C)   Resp 16   Ht 5' 7\" (1.702 m)   Wt 132 lb (59.9 kg)   SpO2 97%   BMI 20.67 kg/m²   Eyes:Sclera are clear without icterus.    ENMT: no obvious neck masses, no ear or lip lesions  CV: RRR. Normal perfusion  Resp: No JVD. Breathing is  non-labored. GI: thin, soft and non-distended, nontender, GB not easily palpable     Musculoskeletal: unremarkable with normal function. Neuro:  No obvious focal deficits  Psychiatric: normal affect and mood, no memory impairment    Lab Results   Component Value Date/Time    WBC 9.2 09/21/2022 03:44 AM    HGB 11.5 09/21/2022 03:44 AM    HCT 34.5 09/21/2022 03:44 AM     09/21/2022 03:44 AM    MCV 97.2 09/21/2022 03:44 AM       Lab Results   Component Value Date     09/21/2022    K 4.0 09/21/2022     09/21/2022    CO2 25 09/21/2022    BUN 14 09/21/2022    CREATININE 0.80 09/21/2022    GLUCOSE 118 (H) 09/21/2022    CALCIUM 8.5 09/21/2022    PROT 6.9 09/20/2022    LABALBU 4.6 09/20/2022    BILITOT 0.5 09/20/2022    ALKPHOS 105 09/20/2022    AST 28 09/20/2022    ALT 28 09/20/2022    LABGLOM >60 09/21/2022    GFRAA >60 09/21/2022    AGRATIO 0.9 (L) 03/17/2022    GLOB 2.3 09/20/2022         CT Result (most recent):  CT ABDOMEN PELVIS W IV CONTRAST 09/20/2022    Narrative  CT OF THE ABDOMEN AND PELVIS WITH CONTRAST. CLINICAL INDICATION: Right lower quadrant abdominal pain    PROCEDURE: Serial thin section axial images obtained from the lung bases through  the proximal femurs following the administration of 100 cc of Isovue 370  intravenous contrast.  Radiation dose reduction techniques were used for this  study. Our CT scanners use one or all of the following: Automated exposure  control, adjusted of the mA and/or kV according to patient size, iterative  reconstruction    COMPARISON: CT abdomen and pelvis dated 8/21/2020    FINDINGS:    CT ABDOMEN: The gallbladder is very distended with a small amount of either  dependent sludge or small gallstones. The gallbladder wall is thin. No  pericholecystic inflammatory fat stranding.  The appendix is normal. The bowel is  normal in course, caliber, and wall thickness. The stomach is decompressed. The  pancreas is normal. The liver and spleen are unremarkable. The kidneys are  symmetric in size and contrast enhancement. There is no hydronephrosis. The  adrenal glands are normal. There is no ascites or adenopathy. CT PELVIS: The bladder is very distended with smooth thin walls. The uterus and  adnexa are unremarkable. The rectum is normal. No free fluid accumulating  filling the pelvis. There is no inguinal hernia. No aggressive osseous lesions identified. Impression  1. Hydropic gallbladder with thin walls. Dependent sludge or small stones noted  in the gallbladder fundus. If there is clinical concern for cholecystitis,  consider further evaluation with right upper quadrant abdominal ultrasound. 2. Otherwise no acute abdominal or pelvic abnormality. The appendix is normal.      US Result (most recent):  US ABDOMEN LIMITED 09/20/2022    Narrative  Right upper quadrant abdominal ultrasound. CLINICAL INDICATION: Cholelithiasis    PROCEDURE: Realtime grayscale and color Doppler evaluation of the right upper  abdominal quadrant. COMPARISON: CT of the abdomen and pelvis dated 9/20/2022    FINDINGS: The liver is normal in size and echogenicity without focal lesion the  gallbladder is distended and near completely full of gallstones. The gallbladder  wall is thin at 2 mm. A negative sonographic Yessenia Luke sign is reported. The common  bile duct measures 3.7 mm. The imaged portion the pancreas is unremarkable. The  right kidney is normal in size measuring 10.8 cm. There is no hydronephrosis. Echogenicity is normal. The aorta and IVC are patent and unremarkable. Impression  Distended gallbladder with cholelithiasis. The gallbladder is near  completely filled with stones. Assessment/Plan:     Matt Alba is a 81 y.o. female who presents with recurrent abdominal pain and cholelithiasis with very enlarged GB.   Hopefully symptoms are related to GB and stones as pathology. Unlikely that GB is functioning and will proceed with cholecystectomy. She has no jaundice. Strong possibility that procedure will convert to open with size limitation of GB. We discussed proceeding with laparoscopic, possible open cholecystectomy. We will give ICG for immunofluorescent cholangiography. I discussed the patient's condition and treatment options with the patient. I discussed risks of surgery in language the patient could understand including bleeding, infection, need for further surgery, abscess, fistula, SBO, DVT, PE, heart attack, stroke, renal failure, respiratory failure, ventilatory dependence, and death. The patient voiced understanding of all this and all questions were answered. Alternatives to surgery were discussed also and risks of the alternatives. The patient requested that we proceed with surgery. Informed consent was obtained.      Patient Active Problem List    Diagnosis Date Noted    Intractable pain 09/20/2022     Priority: Medium    Syncope 03/17/2022    SOB (shortness of breath) 03/17/2022    Acute gastric ulcer without hemorrhage or perforation 04/16/2021    Stage 3a chronic kidney disease ([facility] [address] 04/16/2021    Pain of right sacroiliac joint 01/05/2021    Right hip pain 01/05/2021    Grief 01/05/2021    Irritable bowel syndrome with both constipation and diarrhea 01/05/2021    Right lower quadrant abdominal pain 08/17/2020    Endometriosis 08/17/2020    Vitamin D deficiency 07/23/2020    Caregiver with fatigue 07/23/2020    Dental caries 07/23/2020    Flu-like symptoms 02/10/2020    Acute non-recurrent maxillary sinusitis 02/10/2020    Non-recurrent acute serous otitis media of both ears 02/10/2020    Osteopenia of neck of left femur 08/23/2019     T -2.3 August 2019  Repeat 2021        Paronychia of finger of right hand 08/30/2018    Sciatica of right side 04/03/2018    Raynaud's disease without gangrene 04/03/2018    Chronic neck pain 04/03/2018 Encounter for monitoring chronic NSAID therapy 04/03/2018    Chronic fatigue 04/03/2018    Exposure to varicella zoster virus (VZV) 04/03/2018          Ahsan Cortez MD,  FACS

## 2022-09-21 NOTE — ANESTHESIA PRE PROCEDURE
Department of Anesthesiology  Preprocedure Note       Name:  Simi Bangura   Age:  81 y.o.  :  1940                                          MRN:  [id number]         Date:  2022      Surgeon: Gisel Lozada):  Madelaine Buckner MD    Procedure: Procedure(s):  CHOLECYSTECTOMY LAPAROSCOPIC    Medications prior to admission:   Prior to Admission medications    Medication Sig Start Date End Date Taking?  Authorizing Provider   polyethylene glycol (GLYCOLAX) 17 g packet Take by mouth as needed    Historical Provider, MD   Magnesium Citrate 100 MG CAPS Take by mouth    Historical Provider, MD   DIPHENHYDRAMINE HCL PO Take by mouth    Historical Provider, MD   FEXOFENADINE HCL PO Take by mouth    Historical Provider, MD   Multiple Vitamins-Minerals (MULTIVITAMIN ADULTS PO) Take 1 tablet by mouth daily    Historical Provider, MD   Probiotic Product (PROBIOTIC BLEND PO) Strength: ; Form: ; SIG: take 1 tab  daily 10/21/14   Historical Provider, MD   sucralfate (CARAFATE) 1 GM tablet TAKE 1 TABLET BY MOUTH FOUR TIMES DAILY 21   Ar Automatic Reconciliation       Current medications:    Current Facility-Administered Medications   Medication Dose Route Frequency Provider Last Rate Last Admin    indocyanine green (IC-GREEN) syringe 2.5 mg  2.5 mg IntraVENous On Call to [address], MD        ceFAZolin (ANCEF) 2000 mg in sterile water 20 mL IV syringe  2,000 mg IntraVENous On Call to [address], MD        medicated lip ointment (BLISTEX)   Topical PRN TYREE Templeton - CNP   Given at 22 0840    dextrose 5 % and 0.45 % sodium chloride infusion   IntraVENous Continuous Mercedes Dove MD 75 mL/hr at 22 1019 New Bag at 22 1019    fentaNYL (SUBLIMAZE) injection 100 mcg  100 mcg IntraVENous Once PRN Lore Crowder MD        lactated ringers infusion   IntraVENous Continuous Lore Crowder  mL/hr at 22 1847 New Bag at 22 1847    sodium chloride flush 0.9 % injection 5-40 mL  5-40 mL IntraVENous 2 times per day Minus Bump, MD        sodium chloride flush 0.9 % injection 5-40 mL  5-40 mL IntraVENous PRN Minus BuMD ayesha        midazolam PF (VERSED) injection 2 mg  2 mg IntraVENous Once PRN Minus MD Terrell        sodium chloride flush 0.9 % injection 5-40 mL  5-40 mL IntraVENous 2 times per day Maria Guadalupe Chau, DO   10 mL at 09/21/22 0844    sodium chloride flush 0.9 % injection 5-40 mL  5-40 mL IntraVENous PRN Maria Guadalupe Chau, DO        0.9 % sodium chloride infusion   IntraVENous PRN Maria Guadalupe Chau, DO        potassium chloride (KLOR-CON M) extended release tablet 40 mEq  40 mEq Oral PRN Maria Guadalupe Chau, DO        Or    potassium bicarb-citric acid (EFFER-K) effervescent tablet 40 mEq  40 mEq Oral PRN Maria Guadalupe Chau, DO        Or    potassium chloride 10 mEq/100 mL IVPB (Peripheral Line)  10 mEq IntraVENous PRN Maria Guadalupe Chau, DO        potassium chloride 10 mEq/100 mL IVPB (Peripheral Line)  10 mEq IntraVENous PRN Maria Guadalupe Chau, DO        magnesium sulfate 2000 mg in 50 mL IVPB premix  2,000 mg IntraVENous PRN Maria Guadalupe Chau, DO        ondansetron (ZOFRAN-ODT) disintegrating tablet 4 mg  4 mg Oral Q8H PRN Maria Guadalupe Chau DO        Or    ondansetron [facility]) injection 4 mg  4 mg IntraVENous Q6H PRN Maria Guadalupe Chau, DO   4 mg at 09/21/22 0031    polyethylene glycol (GLYCOLAX) packet 17 g  17 g Oral BID Maria Guadalupe Chau DO        famotidine (PEPCID) tablet 10 mg  10 mg Oral Daily PRN Maria Guadalupe Chau DO        aluminum & magnesium hydroxide-simethicone (MAALOX) 200-200-20 MG/5ML suspension 30 mL  30 mL Oral Q6H PRN Maria Guadalupe Chau, DO        acetaminophen (TYLENOL) tablet 650 mg  650 mg Oral Q6H PRN Maria Guadalupe Chau DO        Or    acetaminophen (TYLENOL) suppository 650 mg  650 mg Rectal Q6H PRN Maria Guadalupe Chau, DO        bisacodyl (DULCOLAX) suppository 10 mg  10 mg Rectal Daily PRN Derral Ear DO Geovanny        [Held by provider] enoxaparin (LOVENOX) injection 40 mg  40 mg SubCUTAneous Daily Claven Ek, DO   40 mg at 09/20/22 2014    docusate sodium (COLACE) capsule 100 mg  100 mg Oral Daily Claven Ek, DO   100 mg at 09/21/22 0840    pantoprazole (PROTONIX) tablet 40 mg  40 mg Oral QAM AC Claven Ek, DO   40 mg at 09/21/22 0703    oxyCODONE (ROXICODONE) immediate release tablet 5 mg  5 mg Oral Q4H PRN Claven Ek, DO        morphine injection 2 mg  2 mg IntraVENous Q3H PRN Claven Ek, DO           Allergies:     Allergies   Allergen Reactions    Sulfamethoxazole-Trimethoprim Hives    Oxycodone-Acetaminophen Nausea And Vomiting       Problem List:    Patient Active Problem List   Diagnosis Code    Pain of right sacroiliac joint M53.3    Right hip pain M25.551    Right lower quadrant abdominal pain R10.31    Sciatica of right side M54.31    Vitamin D deficiency E55.9    Flu-like symptoms R68.89    Raynaud's disease without gangrene I73.00    Chronic neck pain M54.2, G89.29    Caregiver with fatigue R53.83    Osteopenia of neck of left femur M85.852    Acute non-recurrent maxillary sinusitis J01.00    Grief F43.21    Paronychia of finger of right hand L03.011    Irritable bowel syndrome with both constipation and diarrhea K58.2    Acute gastric ulcer without hemorrhage or perforation K25.3    Stage 3a chronic kidney disease (HCC) N18.31    Non-recurrent acute serous otitis media of both ears H65.03    Dental caries K02.9    Encounter for monitoring chronic NSAID therapy Z51.81, Z79.1    Endometriosis N80.9    Chronic fatigue R53.82    Exposure to varicella zoster virus (VZV) Z20.820    Syncope R55    SOB (shortness of breath) R06.02    Intractable pain R52    Calculus of gallbladder with acute on chronic cholecystitis without obstruction K80.12       Past Medical History:        Diagnosis Date    Constipation     Duodenal ulcer     Endometriosis        Past Surgical History:        Procedure Laterality Date    CERVICAL LAMINECTOMY  1998    TONSILLECTOMY      TUBAL LIGATION         Social History:    Social History     Tobacco Use    Smoking status: Never    Smokeless tobacco: Never   Substance Use Topics    Alcohol use: No                                Counseling given: Not Answered      Vital Signs (Current):   Vitals:    09/21/22 0721 09/21/22 0848 09/21/22 1059 09/21/22 1440   BP: (!) 118/51  (!) 110/45 (!) 106/46   Pulse: 61  58 54   Resp: 18  18 18   Temp: 98.1 °F (36.7 °C)  99 °F (37.2 °C) 98.6 °F (37 °C)   TempSrc: Oral  Oral Oral   SpO2: 99% 98% 100% 99%   Weight:       Height:                                                  BP Readings from Last 3 Encounters:   09/21/22 (!) 106/46   09/20/22 (!) 109/48       NPO Status:                                                                                 BMI:   Wt Readings from Last 3 Encounters:   09/20/22 132 lb (59.9 kg)   09/20/22 132 lb (59.9 kg)   03/17/22 130 lb (59 kg)     Body mass index is 20.67 kg/m².     CBC:   Lab Results   Component Value Date/Time    WBC 9.2 09/21/2022 03:44 AM    RBC 3.55 09/21/2022 03:44 AM    HGB 11.5 09/21/2022 03:44 AM    HCT 34.5 09/21/2022 03:44 AM    MCV 97.2 09/21/2022 03:44 AM    RDW 13.3 09/21/2022 03:44 AM     09/21/2022 03:44 AM       CMP:   Lab Results   Component Value Date/Time     09/21/2022 03:44 AM    K 4.0 09/21/2022 03:44 AM     09/21/2022 03:44 AM    CO2 25 09/21/2022 03:44 AM    BUN 14 09/21/2022 03:44 AM    CREATININE 0.80 09/21/2022 03:44 AM    GFRAA >60 09/21/2022 03:44 AM    AGRATIO 0.9 03/17/2022 01:22 PM    LABGLOM >60 09/21/2022 03:44 AM    GLUCOSE 118 09/21/2022 03:44 AM    PROT 6.9 09/20/2022 09:48 AM    CALCIUM 8.5 09/21/2022 03:44 AM    BILITOT 0.5 09/20/2022 09:48 AM    ALKPHOS 105 09/20/2022 09:48 AM    ALKPHOS 83 03/17/2022 01:22 PM    AST 28 09/20/2022 09:48 AM    ALT 28 09/20/2022 09:48 AM POC Tests: No results for input(s): POCGLU, POCNA, POCK, POCCL, POCBUN, POCHEMO, POCHCT in the last 72 hours. Coags: No results found for: PROTIME, INR, APTT    HCG (If Applicable): No results found for: PREGTESTUR, PREGSERUM, HCG, HCGQUANT     ABGs: No results found for: PHART, PO2ART, RCE7LSY, EYQ2PFM, BEART, X0WCHCXX     Type & Screen (If Applicable):  No results found for: LABABO, LABRH    Drug/Infectious Status (If Applicable):  No results found for: HIV, HEPCAB    COVID-19 Screening (If Applicable):   Lab Results   Component Value Date/Time    COVID19 Not detected 03/17/2022 12:41 PM           Anesthesia Evaluation  Patient summary reviewed and Nursing notes reviewed no history of anesthetic complications:   Airway: Mallampati: I  TM distance: >3 FB   Neck ROM: full  Mouth opening: > = 3 FB   Dental: normal exam         Pulmonary: breath sounds clear to auscultation      (-) shortness of breath                           Cardiovascular:  Exercise tolerance: good (>4 METS),       (-) CAD      Rhythm: regular  Rate: normal                    Neuro/Psych:               GI/Hepatic/Renal:   (+) PUD,           Endo/Other:                      ROS comment: Raynauds  endometriosis Abdominal:             Vascular: Other Findings:           Anesthesia Plan      general     ASA 2       Induction: intravenous and rapid sequence. Anesthetic plan and risks discussed with patient and child/children.                         Vika Reeves MD   9/21/2022

## 2022-09-21 NOTE — PROGRESS NOTES
Pt is resting in bed with no complaint of pain or discomfort at this time. Pt has family at the bedside. Pt is alert and oriented times 4. Pt has LR infusing at 100ml/h. Pt is on room air with even and unlabored respirations. Dual skin assessment complete with GREGORY Armas, No open wounds or skin breakdown noted on pt.

## 2022-09-22 ENCOUNTER — APPOINTMENT (OUTPATIENT)
Dept: GENERAL RADIOLOGY | Age: 82
DRG: 417 | End: 2022-09-22
Attending: INTERNAL MEDICINE
Payer: MEDICARE

## 2022-09-22 LAB
ANION GAP SERPL CALC-SCNC: 4 MMOL/L (ref 4–13)
BASOPHILS # BLD: 0 K/UL (ref 0–0.2)
BASOPHILS NFR BLD: 0 % (ref 0–2)
BUN SERPL-MCNC: 14 MG/DL (ref 8–23)
CALCIUM SERPL-MCNC: 8.4 MG/DL (ref 8.3–10.4)
CHLORIDE SERPL-SCNC: 108 MMOL/L (ref 101–110)
CO2 SERPL-SCNC: 26 MMOL/L (ref 21–32)
CREAT SERPL-MCNC: 0.8 MG/DL (ref 0.6–1)
DIFFERENTIAL METHOD BLD: ABNORMAL
EOSINOPHIL # BLD: 0 K/UL (ref 0–0.8)
EOSINOPHIL NFR BLD: 0 % (ref 0.5–7.8)
ERYTHROCYTE [DISTWIDTH] IN BLOOD BY AUTOMATED COUNT: 13.2 % (ref 11.9–14.6)
GLUCOSE SERPL-MCNC: 138 MG/DL (ref 65–100)
HCT VFR BLD AUTO: 33.4 % (ref 35.8–46.3)
HGB BLD-MCNC: 11 G/DL (ref 11.7–15.4)
IMM GRANULOCYTES # BLD AUTO: 0 K/UL (ref 0–0.5)
IMM GRANULOCYTES NFR BLD AUTO: 0 % (ref 0–5)
LACTATE SERPL-SCNC: 1.8 MMOL/L (ref 0.4–2)
LYMPHOCYTES # BLD: 0.8 K/UL (ref 0.5–4.6)
LYMPHOCYTES NFR BLD: 5 % (ref 13–44)
MCH RBC QN AUTO: 32.2 PG (ref 26.1–32.9)
MCHC RBC AUTO-ENTMCNC: 32.9 G/DL (ref 31.4–35)
MCV RBC AUTO: 97.7 FL (ref 79.6–97.8)
MONOCYTES # BLD: 1.1 K/UL (ref 0.1–1.3)
MONOCYTES NFR BLD: 7 % (ref 4–12)
NEUTS SEG # BLD: 13.2 K/UL (ref 1.7–8.2)
NEUTS SEG NFR BLD: 88 % (ref 43–78)
NRBC # BLD: 0 K/UL (ref 0–0.2)
PLATELET # BLD AUTO: 236 K/UL (ref 150–450)
PMV BLD AUTO: 9.4 FL (ref 9.4–12.3)
POTASSIUM SERPL-SCNC: 4 MMOL/L (ref 3.5–5.1)
PROCALCITONIN SERPL-MCNC: <0.05 NG/ML (ref 0–0.49)
RBC # BLD AUTO: 3.42 M/UL (ref 4.05–5.2)
SODIUM SERPL-SCNC: 138 MMOL/L (ref 136–145)
WBC # BLD AUTO: 15.2 K/UL (ref 4.3–11.1)

## 2022-09-22 PROCEDURE — 71045 X-RAY EXAM CHEST 1 VIEW: CPT

## 2022-09-22 PROCEDURE — G0378 HOSPITAL OBSERVATION PER HR: HCPCS

## 2022-09-22 PROCEDURE — 96376 TX/PRO/DX INJ SAME DRUG ADON: CPT

## 2022-09-22 PROCEDURE — 2580000003 HC RX 258: Performed by: ANESTHESIOLOGY

## 2022-09-22 PROCEDURE — 36415 COLL VENOUS BLD VENIPUNCTURE: CPT

## 2022-09-22 PROCEDURE — 80048 BASIC METABOLIC PNL TOTAL CA: CPT

## 2022-09-22 PROCEDURE — 6360000002 HC RX W HCPCS: Performed by: INTERNAL MEDICINE

## 2022-09-22 PROCEDURE — 6370000000 HC RX 637 (ALT 250 FOR IP): Performed by: STUDENT IN AN ORGANIZED HEALTH CARE EDUCATION/TRAINING PROGRAM

## 2022-09-22 PROCEDURE — 84145 PROCALCITONIN (PCT): CPT

## 2022-09-22 PROCEDURE — 6370000000 HC RX 637 (ALT 250 FOR IP): Performed by: NURSE PRACTITIONER

## 2022-09-22 PROCEDURE — 96375 TX/PRO/DX INJ NEW DRUG ADDON: CPT

## 2022-09-22 PROCEDURE — 6360000002 HC RX W HCPCS: Performed by: FAMILY MEDICINE

## 2022-09-22 PROCEDURE — 6370000000 HC RX 637 (ALT 250 FOR IP): Performed by: INTERNAL MEDICINE

## 2022-09-22 PROCEDURE — 83605 ASSAY OF LACTIC ACID: CPT

## 2022-09-22 PROCEDURE — 96374 THER/PROPH/DIAG INJ IV PUSH: CPT

## 2022-09-22 PROCEDURE — 85025 COMPLETE CBC W/AUTO DIFF WBC: CPT

## 2022-09-22 PROCEDURE — 2580000003 HC RX 258: Performed by: INTERNAL MEDICINE

## 2022-09-22 PROCEDURE — 87040 BLOOD CULTURE FOR BACTERIA: CPT

## 2022-09-22 RX ORDER — OXYCODONE HYDROCHLORIDE 5 MG/1
10 TABLET ORAL EVERY 4 HOURS PRN
Status: DISCONTINUED | OUTPATIENT
Start: 2022-09-22 | End: 2022-09-26 | Stop reason: HOSPADM

## 2022-09-22 RX ORDER — ACETAMINOPHEN 500 MG
1000 TABLET ORAL EVERY 8 HOURS SCHEDULED
Status: DISCONTINUED | OUTPATIENT
Start: 2022-09-22 | End: 2022-09-26 | Stop reason: HOSPADM

## 2022-09-22 RX ORDER — OXYCODONE HYDROCHLORIDE 5 MG/1
5 TABLET ORAL EVERY 4 HOURS PRN
Status: DISCONTINUED | OUTPATIENT
Start: 2022-09-22 | End: 2022-09-26 | Stop reason: HOSPADM

## 2022-09-22 RX ORDER — GABAPENTIN 100 MG/1
100 CAPSULE ORAL 3 TIMES DAILY
Status: DISCONTINUED | OUTPATIENT
Start: 2022-09-22 | End: 2022-09-26 | Stop reason: HOSPADM

## 2022-09-22 RX ADMIN — PANTOPRAZOLE SODIUM 40 MG: 40 TABLET, DELAYED RELEASE ORAL at 06:05

## 2022-09-22 RX ADMIN — GABAPENTIN 100 MG: 100 CAPSULE ORAL at 14:22

## 2022-09-22 RX ADMIN — SODIUM CHLORIDE, POTASSIUM CHLORIDE, SODIUM LACTATE AND CALCIUM CHLORIDE: 600; 310; 30; 20 INJECTION, SOLUTION INTRAVENOUS at 06:05

## 2022-09-22 RX ADMIN — SODIUM CHLORIDE, PRESERVATIVE FREE 10 ML: 5 INJECTION INTRAVENOUS at 21:12

## 2022-09-22 RX ADMIN — PHENOL 1 SPRAY: 1.5 LIQUID ORAL at 09:54

## 2022-09-22 RX ADMIN — ACETAMINOPHEN 1000 MG: 500 TABLET, FILM COATED ORAL at 21:11

## 2022-09-22 RX ADMIN — ACETAMINOPHEN 1000 MG: 500 TABLET, FILM COATED ORAL at 14:22

## 2022-09-22 RX ADMIN — BENZOCAINE AND MENTHOL 1 LOZENGE: 15; 3.6 LOZENGE ORAL at 09:54

## 2022-09-22 RX ADMIN — OXYCODONE 10 MG: 5 TABLET ORAL at 21:22

## 2022-09-22 RX ADMIN — MORPHINE SULFATE 2 MG: 2 INJECTION, SOLUTION INTRAMUSCULAR; INTRAVENOUS at 09:12

## 2022-09-22 RX ADMIN — GABAPENTIN 100 MG: 100 CAPSULE ORAL at 10:29

## 2022-09-22 RX ADMIN — GABAPENTIN 100 MG: 100 CAPSULE ORAL at 21:11

## 2022-09-22 RX ADMIN — OXYCODONE 10 MG: 5 TABLET ORAL at 10:13

## 2022-09-22 RX ADMIN — MORPHINE SULFATE 2 MG: 2 INJECTION, SOLUTION INTRAMUSCULAR; INTRAVENOUS at 06:05

## 2022-09-22 RX ADMIN — MORPHINE SULFATE 2 MG: 2 INJECTION, SOLUTION INTRAMUSCULAR; INTRAVENOUS at 13:17

## 2022-09-22 RX ADMIN — DOCUSATE SODIUM 100 MG: 100 CAPSULE, LIQUID FILLED ORAL at 08:37

## 2022-09-22 RX ADMIN — MORPHINE SULFATE 2 MG: 2 INJECTION, SOLUTION INTRAMUSCULAR; INTRAVENOUS at 17:16

## 2022-09-22 RX ADMIN — HYDROMORPHONE HYDROCHLORIDE 0.5 MG: 1 INJECTION, SOLUTION INTRAMUSCULAR; INTRAVENOUS; SUBCUTANEOUS at 00:06

## 2022-09-22 RX ADMIN — OXYCODONE 10 MG: 5 TABLET ORAL at 14:27

## 2022-09-22 RX ADMIN — POLYETHYLENE GLYCOL 3350 17 G: 17 POWDER, FOR SOLUTION ORAL at 21:12

## 2022-09-22 RX ADMIN — SODIUM CHLORIDE, PRESERVATIVE FREE 10 ML: 5 INJECTION INTRAVENOUS at 08:38

## 2022-09-22 RX ADMIN — OXYCODONE 5 MG: 5 TABLET ORAL at 08:38

## 2022-09-22 ASSESSMENT — PAIN DESCRIPTION - LOCATION
LOCATION: ABDOMEN

## 2022-09-22 ASSESSMENT — PAIN - FUNCTIONAL ASSESSMENT
PAIN_FUNCTIONAL_ASSESSMENT: ACTIVITIES ARE NOT PREVENTED

## 2022-09-22 ASSESSMENT — PAIN SCALES - GENERAL
PAINLEVEL_OUTOF10: 10
PAINLEVEL_OUTOF10: 5
PAINLEVEL_OUTOF10: 8
PAINLEVEL_OUTOF10: 9
PAINLEVEL_OUTOF10: 7
PAINLEVEL_OUTOF10: 7
PAINLEVEL_OUTOF10: 10
PAINLEVEL_OUTOF10: 9
PAINLEVEL_OUTOF10: 8
PAINLEVEL_OUTOF10: 0
PAINLEVEL_OUTOF10: 8
PAINLEVEL_OUTOF10: 6
PAINLEVEL_OUTOF10: 9
PAINLEVEL_OUTOF10: 10
PAINLEVEL_OUTOF10: 8
PAINLEVEL_OUTOF10: 6
PAINLEVEL_OUTOF10: 10

## 2022-09-22 ASSESSMENT — PAIN DESCRIPTION - DESCRIPTORS
DESCRIPTORS: SHARP
DESCRIPTORS: ACHING
DESCRIPTORS: SHARP
DESCRIPTORS: SHARP

## 2022-09-22 ASSESSMENT — PAIN DESCRIPTION - ORIENTATION
ORIENTATION: MID
ORIENTATION: OTHER (COMMENT)
ORIENTATION: PROXIMAL
ORIENTATION: OTHER (COMMENT)
ORIENTATION: OTHER (COMMENT)
ORIENTATION: PROXIMAL
ORIENTATION: OTHER (COMMENT)

## 2022-09-22 ASSESSMENT — PAIN DESCRIPTION - PAIN TYPE: TYPE: ACUTE PAIN

## 2022-09-22 NOTE — PROGRESS NOTES
Received report from [facility]. Patient is resting in bed with eyes closed. Awakens when spoken to. Patient is alert and oriented times 4. Patient states sore throat, requesting cough drops or spray. Will contact MD. Abdominal dressing clean, dry and intact. LR running at 125mL/hr. Call light within reach. Will continue to monitor.

## 2022-09-22 NOTE — PROGRESS NOTES
[address], [address] [place] [address]  [facility], [address] [facility]  [phone]    Office Note/Consult Note   Joce Camacho   MRN: [id number]     : 1940        HPI: Joce Camacho is a 81 y.o. female who is seen at request of Dr. Tad Pablo for intractable pain from cholelithiasis. She has had years of symptoms that could have been biliary colic vs. IBS vs. Chronic constipation. She has h/o endometriosis. She has had CT imaging which did/does not show her gallstones that are well seen and fill her GB on US. Her GB is enlarged with a thin wall. She has several medical issues, but no prior abdominal surgeries. She is currently without pain, but would like to proceed with cholecystectomy. I have personally reviewed images including previous CT which did not describe the enlarged GB present. It is only slightly smaller than current scan.    22 CHOLECYSTECTOMY LAPAROSCOPIC WITH ICG CHOLANGIOGRAPHY    21 POD1: \"thirsty\" with abdominal pain as expected. VSS/AF. Abd: soft, midline and trochar site dressings CDI.   Tenderness with exam.   WBC 15, Hgb 11.2, Cr 0.8    Past Medical History:   Diagnosis Date    Constipation     Duodenal ulcer     Endometriosis      Past Surgical History:   Procedure Laterality Date    CERVICAL LAMINECTOMY  1998    TONSILLECTOMY      TUBAL LIGATION       Current Facility-Administered Medications   Medication Dose Route Frequency    benzocaine-menthol (CEPACOL SORE THROAT) lozenge 1 lozenge  1 lozenge Oral Q2H PRN    phenol 1.4 % mouth spray 1 spray  1 spray Mouth/Throat Q2H PRN    medicated lip ointment (BLISTEX)   Topical PRN    dextrose 5 % and 0.45 % sodium chloride infusion   IntraVENous Continuous    lactated ringers infusion   IntraVENous Continuous    HYDROmorphone HCl PF (DILAUDID) injection 0.25 mg  0.25 mg IntraVENous Q5 Min PRN    HYDROmorphone HCl PF (DILAUDID) injection 0.5 mg  0.5 mg IntraVENous Q10 Min PRN    sodium chloride flush 0.9 % injection 5-40 mL  5-40 mL IntraVENous 2 times per day    sodium chloride flush 0.9 % injection 5-40 mL  5-40 mL IntraVENous PRN    0.9 % sodium chloride infusion   IntraVENous PRN    potassium chloride (KLOR-CON M) extended release tablet 40 mEq  40 mEq Oral PRN    Or    potassium bicarb-citric acid (EFFER-K) effervescent tablet 40 mEq  40 mEq Oral PRN    Or    potassium chloride 10 mEq/100 mL IVPB (Peripheral Line)  10 mEq IntraVENous PRN    potassium chloride 10 mEq/100 mL IVPB (Peripheral Line)  10 mEq IntraVENous PRN    magnesium sulfate 2000 mg in 50 mL IVPB premix  2,000 mg IntraVENous PRN    ondansetron (ZOFRAN-ODT) disintegrating tablet 4 mg  4 mg Oral Q8H PRN    Or    ondansetron (ZOFRAN) injection 4 mg  4 mg IntraVENous Q6H PRN    polyethylene glycol (GLYCOLAX) packet 17 g  17 g Oral BID    famotidine (PEPCID) tablet 10 mg  10 mg Oral Daily PRN    aluminum & magnesium hydroxide-simethicone (MAALOX) 200-200-20 MG/5ML suspension 30 mL  30 mL Oral Q6H PRN    acetaminophen (TYLENOL) tablet 650 mg  650 mg Oral Q6H PRN    Or    acetaminophen (TYLENOL) suppository 650 mg  650 mg Rectal Q6H PRN    bisacodyl (DULCOLAX) suppository 10 mg  10 mg Rectal Daily PRN    [Held by provider] enoxaparin (LOVENOX) injection 40 mg  40 mg SubCUTAneous Daily    docusate sodium (COLACE) capsule 100 mg  100 mg Oral Daily    pantoprazole (PROTONIX) tablet 40 mg  40 mg Oral QAM AC    oxyCODONE (ROXICODONE) immediate release tablet 5 mg  5 mg Oral Q4H PRN    morphine injection 2 mg  2 mg IntraVENous Q3H PRN     ALLERGIES:  Sulfamethoxazole-trimethoprim and Oxycodone-acetaminophen    Social History     Socioeconomic History    Marital status:      Spouse name: None    Number of children: None    Years of education: None    Highest education level: None   Tobacco Use    Smoking status: Never    Smokeless tobacco: Never   Substance and Sexual Activity    Alcohol use: No    Drug use:  No Social History     Tobacco Use   Smoking Status Never   Smokeless Tobacco Never     Family History   Problem Relation Age of Onset    Diabetes Brother     Diabetes Father     Heart Disease Mother     Heart Disease Brother        ROS: The patient has no difficulty with chest pain or shortness of breath. No fever or chills. The patient denies any personal or family history of abnormal clotting or bleeding. Comprehensive review of systems was otherwise unremarkable except as noted above. Physical Exam:   Constitutional: Alert oriented cooperative patient in no acute distress. BP (!) 125/50   Pulse 61   Temp 98.1 °F (36.7 °C) (Oral)   Resp 17   Ht 5' 7\" (1.702 m)   Wt 132 lb (59.9 kg)   SpO2 100%   BMI 20.67 kg/m²   Eyes:Sclera are clear without icterus. ENMT: no obvious neck masses, no ear or lip lesions  CV: RRR. Normal perfusion  Resp: No JVD. Breathing is  non-labored. GI: thin, soft and non-distended with midline and trochar dressing sites CDI. Musculoskeletal: unremarkable with normal function.    Neuro:  No obvious focal deficits  Psychiatric: normal affect and mood, no memory impairment    Lab Results   Component Value Date/Time    WBC 15.2 09/22/2022 03:50 AM    HGB 11.0 09/22/2022 03:50 AM    HCT 33.4 09/22/2022 03:50 AM     09/22/2022 03:50 AM    MCV 97.7 09/22/2022 03:50 AM       Lab Results   Component Value Date     09/22/2022    K 4.0 09/22/2022     09/22/2022    CO2 26 09/22/2022    BUN 14 09/22/2022    CREATININE 0.80 09/22/2022    GLUCOSE 138 (H) 09/22/2022    CALCIUM 8.4 09/22/2022    PROT 6.9 09/20/2022    LABALBU 4.6 09/20/2022    BILITOT 0.5 09/20/2022    ALKPHOS 105 09/20/2022    AST 28 09/20/2022    ALT 28 09/20/2022    LABGLOM >60 09/22/2022    GFRAA >60 09/22/2022    AGRATIO 0.9 (L) 03/17/2022    GLOB 2.3 09/20/2022         CT Result (most recent):  CT ABDOMEN PELVIS W IV CONTRAST 09/20/2022    Narrative  CT OF THE ABDOMEN AND PELVIS WITH CONTRAST. CLINICAL INDICATION: Right lower quadrant abdominal pain    PROCEDURE: Serial thin section axial images obtained from the lung bases through  the proximal femurs following the administration of 100 cc of Isovue 370  intravenous contrast.  Radiation dose reduction techniques were used for this  study. Our CT scanners use one or all of the following: Automated exposure  control, adjusted of the mA and/or kV according to patient size, iterative  reconstruction    COMPARISON: CT abdomen and pelvis dated 8/21/2020    FINDINGS:    CT ABDOMEN: The gallbladder is very distended with a small amount of either  dependent sludge or small gallstones. The gallbladder wall is thin. No  pericholecystic inflammatory fat stranding. The appendix is normal. The bowel is  normal in course, caliber, and wall thickness. The stomach is decompressed. The  pancreas is normal. The liver and spleen are unremarkable. The kidneys are  symmetric in size and contrast enhancement. There is no hydronephrosis. The  adrenal glands are normal. There is no ascites or adenopathy. CT PELVIS: The bladder is very distended with smooth thin walls. The uterus and  adnexa are unremarkable. The rectum is normal. No free fluid accumulating  filling the pelvis. There is no inguinal hernia. No aggressive osseous lesions identified. Impression  1. Hydropic gallbladder with thin walls. Dependent sludge or small stones noted  in the gallbladder fundus. If there is clinical concern for cholecystitis,  consider further evaluation with right upper quadrant abdominal ultrasound. 2. Otherwise no acute abdominal or pelvic abnormality. The appendix is normal.      US Result (most recent):  US ABDOMEN LIMITED 09/20/2022    Narrative  Right upper quadrant abdominal ultrasound. CLINICAL INDICATION: Cholelithiasis    PROCEDURE: Realtime grayscale and color Doppler evaluation of the right upper  abdominal quadrant.     COMPARISON: CT of the abdomen and pelvis dated 9/20/2022    FINDINGS: The liver is normal in size and echogenicity without focal lesion the  gallbladder is distended and near completely full of gallstones. The gallbladder  wall is thin at 2 mm. A negative sonographic Harriet Eddie sign is reported. The common  bile duct measures 3.7 mm. The imaged portion the pancreas is unremarkable. The  right kidney is normal in size measuring 10.8 cm. There is no hydronephrosis. Echogenicity is normal. The aorta and IVC are patent and unremarkable. Impression  Distended gallbladder with cholelithiasis. The gallbladder is near  completely filled with stones. Assessment/Plan:     Nika Dye is a 81 y.o. female who presents with recurrent abdominal pain and cholelithiasis with very enlarged GB. Hopefully symptoms are related to GB and stones as pathology. Unlikely that GB is functioning and will proceed with cholecystectomy. She has no jaundice. Strong possibility that procedure will convert to open with size limitation of GB. We discussed proceeding with laparoscopic, possible open cholecystectomy. We will give ICG for immunofluorescent cholangiography. I discussed the patient's condition and treatment options with the patient. I discussed risks of surgery in language the patient could understand including bleeding, infection, need for further surgery, abscess, fistula, SBO, DVT, PE, heart attack, stroke, renal failure, respiratory failure, ventilatory dependence, and death. The patient voiced understanding of all this and all questions were answered. Alternatives to surgery were discussed also and risks of the alternatives. The patient requested that we proceed with surgery. Informed consent was obtained.        Cholelithiasis with chronic cholecystitis  S/P 9/21/22 CHOLECYSTECTOMY LAPAROSCOPIC WITH ICG CHOLANGIOGRAPHY  >doing well  >CLD diet with IVFs (CLD for several days)  >monitor for pain and treat   >Dressings off in 4 days  >IS        Patient Active Problem List    Diagnosis Date Noted    Calculus of gallbladder with acute on chronic cholecystitis without obstruction 09/21/2022     Priority: Medium    Intractable pain 09/20/2022     Priority: Medium    Syncope 03/17/2022    SOB (shortness of breath) 03/17/2022    Acute gastric ulcer without hemorrhage or perforation 04/16/2021    Stage 3a chronic kidney disease ([facility] 04/16/2021    Pain of right sacroiliac joint 01/05/2021    Right hip pain 01/05/2021    Grief 01/05/2021    Irritable bowel syndrome with both constipation and diarrhea 01/05/2021    Right lower quadrant abdominal pain 08/17/2020    Endometriosis 08/17/2020    Vitamin D deficiency 07/23/2020    Caregiver with fatigue 07/23/2020    Dental caries 07/23/2020    Flu-like symptoms 02/10/2020    Acute non-recurrent maxillary sinusitis 02/10/2020    Non-recurrent acute serous otitis media of both ears 02/10/2020    Osteopenia of neck of left femur 08/23/2019     T -2.3 August 2019  Repeat 2021        Paronychia of finger of right hand 08/30/2018    Sciatica of right side 04/03/2018    Raynaud's disease without gangrene 04/03/2018    Chronic neck pain 04/03/2018    Encounter for monitoring chronic NSAID therapy 04/03/2018    Chronic fatigue 04/03/2018    Exposure to varicella zoster virus (VZV) 04/03/2018          Arvin Butterfield, TYREE - CNP,  FACS

## 2022-09-22 NOTE — BRIEF OP NOTE
Brief Postoperative Note      Patient: Chelsea Dillard  YOB: 1940  MRN: [id number]    Date of Procedure: 9/21/2022    Pre-Op Diagnosis: Cholecystitis [K81.9]    Post-Op Diagnosis: Same and Cholelithiasis with chronic cholecystitis       Procedure(s):  CHOLECYSTECTOMY LAPAROSCOPIC WITH ICG CHOLANGIOGRAPHY    Surgeon(s):  Mariel Irene MD    Assistant:  Surgical Assistant: Baltazar Washington    Anesthesia: General    Estimated Blood Loss (mL): Minimal    Complications: None    Specimens:   ID Type Source Tests Collected by Time Destination   A : Gallbladder Tissue Gallbladder SURGICAL PATHOLOGY Mariel Irene MD 9/21/2022 2050        Implants:  * No implants in log *      Drains:   Urinary Catheter 09/21/22 [place] (Active)       [REMOVED] NG/OG/NJ/NE Tube Orogastric 18 fr Center mouth (Removed)       Findings: Large palpable GB to below umbilicus. Chronic adhesions to T-colon and duodenum and limited working room. Small thermal injury to T-colon wall (~5 mm). ICG uptake in GB and CBD, cystic duct well seen. Normal anatomy. Delicate cystic duct. ML clips used. Connected midepigastric and infraumbilical port sites through small umbilical hernia to laparotomy for GB extraction and to oversew area on T-colon. Anesthesia placed rectus sheath blocks. GB opened on back table filled with TNTC 5-6 mm stones and normal green bile. Normal anatomy, no mass.   Liver normal.    Electronically signed by Mariel Irene MD on 9/21/2022 at 9:06 PM

## 2022-09-22 NOTE — PROGRESS NOTES
Patient states abdominal pain is 10/10. 10 mg Narco given. Call bell within reach.  Will continue to monitor

## 2022-09-22 NOTE — PROGRESS NOTES
Patient states pain 7/10 in abdomen. 2 mL of morphine given. Call bell within reach.  Will continue to monitor

## 2022-09-22 NOTE — PROGRESS NOTES
09/22/22 0910   Resting (Room Air)   SpO2 97   HR 89   After Walk   Comments Unable to walk patient due to pain level.    Does the Patient Qualify for Home O2 No   Does the Patient Need Portable Oxygen Tanks No

## 2022-09-22 NOTE — ANESTHESIA PROCEDURE NOTES
Peripheral Block    Patient location during procedure: OR  Reason for block: post-op pain management and at surgeon's request  Start time: 9/21/2022 8:59 PM  End time: 9/21/2022 9:05 PM  Staffing  Performed: anesthesiologist   Anesthesiologist: Lupillo Mac MD  Preanesthetic Checklist  Completed: patient identified, IV checked, site marked, risks and benefits discussed, surgical/procedural consents, equipment checked, pre-op evaluation, timeout performed, anesthesia consent given, oxygen available and monitors applied/VS acknowledged  Peripheral Block   Patient position: supine  Prep: ChloraPrep  Provider prep: mask and sterile gloves  Patient monitoring: cardiac monitor, continuous pulse ox, continuous capnometry, frequent blood pressure checks, IV access and oxygen  Block type: TAP  Laterality: bilateral  Injection technique: single-shot  Guidance: ultrasound guided    Needle   Needle type: insulated echogenic nerve stimulator needle   Needle gauge: 21 G  Needle localization: anatomical landmarks and ultrasound guidance  Assessment   Injection assessment: negative aspiration for heme  Slow fractionated injection: yes  Real-time US image taken/store: yes  Outcomes: uncomplicated    Additional Notes  Active ultrasound guidance before during and after injection; local visualized surrounding nerve distribution area. Image stored and placed in record. 1 attempt each side. No gross abnormalities observed.     30 ml each side  Medications Administered  ropivacaine (NAROPIN) injection 0.5% - Perineural   60 mL - 9/21/2022 8:59:00 PM

## 2022-09-22 NOTE — OP NOTE
Operative Note      Patient: Kiley Pemberton  YOB: 1940  MRN: [id number]    Date of Procedure: 9/21/2022    Pre-Op Diagnosis: Cholecystitis [K81.9]     Post-Op Diagnosis: Same and Cholelithiasis with chronic cholecystitis       Procedure(s):  CHOLECYSTECTOMY LAPAROSCOPIC WITH ICG CHOLANGIOGRAPHY [cpt 38691-48]     Surgeon(s):  Alyssa Mosley MD     Assistant:  Surgical Assistant: Jeff Moses     Anesthesia: General     Estimated Blood Loss (mL): Minimal     Complications: None     Specimens:   ID Type Source Tests Collected by Time Destination   A : Gallbladder Tissue Gallbladder SURGICAL PATHOLOGY Alyssa Mosley MD 9/21/2022 2050           Implants:  * No implants in log *      Drains:   Urinary Catheter 09/21/22 Schaefer (Active)       [REMOVED] NG/OG/NJ/NE Tube Orogastric 18 fr Center mouth (Removed)         Findings: Large palpable GB to below umbilicus. Chronic adhesions to T-colon and duodenum and limited working room. Small thermal injury to T-colon wall (~5 mm). ICG uptake in GB and CBD, cystic duct well seen. Normal anatomy. Delicate cystic duct. ML clips used. Connected midepigastric and infraumbilical port sites through small umbilical hernia to laparotomy for GB extraction and to oversew area on T-colon. Anesthesia placed rectus sheath blocks. GB opened on back table filled with TNTC 5-6 mm stones and normal green bile. Normal anatomy, no mass. Liver normal.    Detailed Description of Procedure: The risks, benefits, potential complications, treatment options, and expected outcomes were discussed with the patient pre-operatively. The patient voiced understanding and gave informed consent preoperatively. The patient was taken to the Operating Room, and the [facility] time-out protocol checklist was followed. After the induction of adequate anesthesia, the abdomen was prepped and draped in the usual sterile fashion. Preoperative antibiotics were given.   A Schaefer catheter had been placed. The patient had known large GB by imaging and could be palpated and visualized in the R abdomen to below the umbilicus. An infraumbilical incision was made and a cut down approach was used to place a balloon tip Juan trocar under direct vision. After adequate pneumoperitioneum, two 5 mm static and dynamic retraction ports were placed in the R abdomen at the level of the umbilicus and an 11 mm trocar also placed in the midepigastrium. The liver appeared very normal for her age. The massively enlarged gallbladder was not tense but was full. It could be grasped and reflected cephalad requiring multiple regrasps to optimize position due to its large size. Unfortunately, there were extensive adhesions between the undersurface of the gallbladder and the transverse colon and duodenum. Traction and countertraction was used to help separate these structures for electrosurgical energy division using the hook cautery. A small area of thermal change was visible on the wall of the transverse colon as the structures were . There was no perforation. This area would be reexamined later. The gallbladder was large and pale, consistent with chronic cholecystitis as well as the chronic inflammatory adhesions. The patient had received 2.5 mg of ICG. Near infrared immunofluorescent imaging was performed using the spy system technology. The course of the common duct, cystic duct and green fluorescence within the gallbladder were seen thereby obtaining real-time cholangiography. Careful and tedious dissection was performed to free up the cystic duct and artery from the surrounding tissues. A clear window was created between the inferior aspect of the gallbladder wall, the inferior gallbladder fossa, the cystic duct, and the cystic artery. The critical view was thereby obtained with this 1 duct and 1 artery being the only structures attached to the gallbladder in this view.   The cystic duct could clearly be seen to enter the gallbladder and the cystic artery seen to traverse on the gallbladder wall toward the fundus of the gallbladder. The cystic duct was delicate. We placed 3 ML clips on the distal cystic duct (patient side) and 2 clips on the proximal (specimen side) of the cystic duct. We then placed 2 clips on the proximal cystic artery and 2 clips on the distal cystic artery. Both structures were then divided. The cystic artery could be seen to pulsate at its clipped end as usual.  The gallbladder was then removed from its attachments to the liver within the gallbladder peritoneal plane using the hook monopolar electrosurgical energy device. The gallbladder was much too large to be placed in an Endo Catch bag. Since also the colon wall and duodenum wall needed to be closely examined for thermal injury, decision was made to perform a midline laparotomy between the 2 midline trochars. The infraumbilical trocar vertical incision was connected to the mid epigastric trocar vertical incision and following removal of the balloon trocar and desufflation of the pneumoperitoneum, the midline was opened through the mid linea alba. The remaining trochars were removed. The specimen which was within the grasper jaws was removed prior to trocar removal and placed on the back table. The gallbladder was intact without spillage. The transverse colon was easily exteriorized and the area of thermal injury appeared to be 5 mm and did not have clear identification of depth of injury, but it was decided to imbricate this area with interrupted Lembert sutures of 3-0 Vicryl. The duodenum was reinspected and there was no evidence of any thermal injury. Viscera were returned to anatomic position. The right upper quadrant area was reinspected and there was no evidence of bile leak or bleeding. The clips on the artery and duct were intact and hemostatsis confirmed.   The midline fascia closed with running double-stranded 0 PDS suture. Subcuticular 4-0 Vicryl was used to close the skin incisions. The wounds were dressed sterilely with Steri-Strips, 2 x 2's and tegaderm and an OpSite for the midline incision. Anesthesia placed rectus sheath blocks. GB opened on back table filled with TNTC 5-6 mm stones and normal green bile. Normal anatomy, no mass. All sponge, instruments, and needle counts were correct. The patient was taken to PACU in good condition. This procedure was much more complex than a standard laparoscopic cholecystectomy with cholangiography requiring additional time to oversew the colon and open/close the laparotomy. The cholecystectomy was performed laparoscopically.     Electronically signed by German Tirado MD on 9/21/2022 at 9:35 PM

## 2022-09-22 NOTE — ANESTHESIA POSTPROCEDURE EVALUATION
Department of Anesthesiology  Postprocedure Note    Patient: Canelo Alves  MRN: [id number]  YOB: 1940  Date of evaluation: 9/21/2022      Procedure Summary     Date: 09/21/22 Room / Location: [facility] MAIN OR  / [facility] MAIN OR    Anesthesia Start: 1910 Anesthesia Stop: 2114    Procedure: CHOLECYSTECTOMY LAPAROSCOPIC (Abdomen) Diagnosis:       Cholecystitis      (Cholecystitis [K81.9])    Providers: Omero Merino MD Responsible Provider: Radha James MD    Anesthesia Type: General ASA Status: 2          Anesthesia Type: General    Arsenio Phase I: Arsenio Score: 9    Arsenio Phase II:        Anesthesia Post Evaluation    Patient location during evaluation: PACU  Patient participation: complete - patient participated  Level of consciousness: awake and alert  Airway patency: patent  Nausea & Vomiting: no nausea  Cardiovascular status: hemodynamically stable  Respiratory status: acceptable  Hydration status: euvolemic

## 2022-09-22 NOTE — PROGRESS NOTES
Pt having abdominal pain 10/10. Given 2 mg of Morphine. Will continue to monitor. Call bell within reach.

## 2022-09-22 NOTE — PROGRESS NOTES
Pt complained of abdominal pain at a 10 while breathing. MD made aware via perfect serve.  1 time dose of dilaudid 0.5mg given IV

## 2022-09-22 NOTE — ACP (ADVANCE CARE PLANNING)
Advance Care Planning     Advance Care Planning Inpatient Note  [facility] Department    Today's Date: 9/22/2022  Unit: SFD 8 MED SURG    Received request from IDT Member. Upon review of chart and communication with care team, patient's decision making abilities are not in question. . Patient was/were present in the room during visit. Goals of ACP Conversation:  Discuss advance care planning documents    Health Care Decision Makers:     No healthcare decision makers have been documented. Click here to complete [address] including selection of the Healthcare Decision Maker Relationship (ie \"Primary\")  Summary:  Patient stated that Son has found her existing POA paperwork and is bringing it in for the hospital to document. Advance Care Planning Documents (Patient Wishes):  None     Assessment:   Patient is  and wants to name her son as HCPOA    Interventions:  Requested patient/family to submit existing document for our records: Healthcare Power of /Advance Directive Appointment of [address] Preferences Communicated:   No    Outcomes/Plan:  Patient's family plans to bring in existing documentation.    is available as needed    Electronically signed by Chaplain Etta on 9/22/2022 at 2:00 PM

## 2022-09-22 NOTE — PROGRESS NOTES
Hospitalist Progress Note   Admit Date:  2022  6:39 PM   Name:  Margarita Kim   Age:  81 y.o. Sex:  female  :  1940   MRN:  [id number]   Room:  [facility]/    Presenting Complaint: No chief complaint on file. Reason(s) for Admission: Intractable pain [R52]     Hospital Course:   Margarita Kim is a 81 y.o. female with medical history of chronic constipation, remote peptic ulcer duodenal  and significant endometriosis. She has had a cervical laminectomy tonsillectomy and tubal ligation in the past but relatively benign medical history for her age. Who presented with 1-1/2-year history of postprandial abdominal discomfort-recently over the past several weeks increased abdominal discomfort and particularly right lower quadrant pain which progressed to the point where she sought care in an emergency department today at [facility] emergency facility. Initially felt to be urinary source but urinalysis negative and CT abdomen and pelvis revealed sludge versus other and a very thin-walled hydropic gallbladder which prompted ultrasound which showed that the gallbladder is nearly completely filled with stones. Patient had intractable pain and would not be discharged home as recommended because of intractable pain requiring IV narcotics and [facility] requested admission here for intractable pain. Surgery was notified and they recommended outpatient follow-up in the office. N.p.o. status IV hydration. CT imaging which did/does not show her gallstones that are well seen and fill her GB on US. Her GB is enlarged with a thin wall. Patient had laparoscopic cholecystectomy with ICG cholangiography by surgery on . Subjective & 24hr Events (22): Pt is seen and examined at the bedside. Pt states her abdominal pain is worse and pain medication is not helping. She is complaining of sore throat. Daughter-in-law at the bedside. Requested for abdominal binder. Assessment & Plan:      Cholelithiasis  S/p Laparoscopic cholecystectomy with ICG cholangiography POD 1   Pain management with Oxycodone moderate pain and IV narcotics for severe pain  Advanced the diet to clear liquids  Abdominal binder  Surgical following and appreciate recommendations    Leukocytosis of 15 K  Most likely due to recent surgery   lactic acid, pro calcitonin normal.  Follow-up with blood cultures      Acute hypoxic respiratory failure  Patient is saturating well on 2 L of oxygen  Wean off oxygen as tolerated  RT assessed and she saturating 97% on room air  Incentive spirometer     Chronic constipation--MiraLAX twice daily stool softener-monitor for worsening with narcotics     History of duodenal ulcer disease remote. Continue  PPI daily. Anticipated discharge needs: Anticipating hospital stay for 2 to 3 days         Diet:  ADULT DIET; Clear Liquid  DVT PPx: SCD for now. Will start lovenox tomorrow 9/23  Code status: Full Code    Hospital Problems:  Principal Problem:    Intractable pain  Active Problems:    Calculus of gallbladder with acute on chronic cholecystitis without obstruction  Resolved Problems:    * No resolved hospital problems.  *      Objective:   Patient Vitals for the past 24 hrs:   Temp Pulse Resp BP SpO2   09/22/22 1317 -- -- 18 -- --   09/22/22 1107 97.9 °F (36.6 °C) 59 19 (!) 122/51 100 %   09/22/22 1043 -- -- 16 -- --   09/22/22 1013 -- -- 18 -- --   09/22/22 0942 -- -- 16 -- --   09/22/22 0912 -- -- 16 -- --   09/22/22 0838 -- -- 16 -- --   09/22/22 0722 98.1 °F (36.7 °C) 61 17 (!) 125/50 100 %   09/22/22 0605 -- -- 16 -- --   09/22/22 0317 97.9 °F (36.6 °C) 66 16 (!) 122/53 99 %   09/22/22 0036 -- -- 16 -- --   09/22/22 0006 -- -- 14 -- --   09/21/22 2250 -- -- 14 -- --   09/21/22 2220 -- -- 14 -- --   09/21/22 2206 97.7 °F (36.5 °C) 59 12 (!) 154/52 98 %   09/21/22 2150 -- 68 16 (!) 163/72 97 %   09/21/22 2145 98.2 °F (36.8 °C) 68 16 (!) 173/69 98 %   09/21/22 2140 -- 67 16 (!) 202/84 93 %   09/21/22 2135 -- 81 16 (!) 189/82 98 %   09/21/22 2130 -- 75 16 (!) 192/88 98 %   09/21/22 2125 -- 72 16 (!) 112/56 97 %   09/21/22 2120 -- 69 15 (!) 182/79 97 %   09/21/22 2115 -- 79 15 (!) 184/80 97 %   09/21/22 2113 97.8 °F (36.6 °C) 84 14 (!) 184/80 97 %   09/21/22 1841 98.4 °F (36.9 °C) 50 12 (!) 109/48 97 %       Oxygen Therapy  SpO2: 100 %  Pulse Oximeter Device Mode: Intermittent  Pulse Oximeter Device Location: Finger  O2 Device: Nasal cannula  O2 Flow Rate (L/min): 2 L/min    Estimated body mass index is 20.67 kg/m² as calculated from the following:    Height as of this encounter: 5' 7\" (1.702 m). Weight as of this encounter: 132 lb (59.9 kg). Intake/Output Summary (Last 24 hours) at 9/22/2022 1524  Last data filed at 9/22/2022 0845  Gross per 24 hour   Intake 3038 ml   Output 310 ml   Net 2728 ml         Physical Exam:     Blood pressure (!) 122/51, pulse 59, temperature 97.9 °F (36.6 °C), temperature source Oral, resp. rate 18, height 5' 7\" (1.702 m), weight 132 lb (59.9 kg), SpO2 100 %. General:    Well nourished. Head:  Normocephalic, atraumatic  Eyes:  Sclerae appear normal.  Pupils equally round. ENT:  Nares appear normal, no drainage. Moist oral mucosa  Neck:  No restricted ROM. Trachea midline   CV:   RRR. No m/r/g. No jugular venous distension. Lungs:   CTAB. No wheezing, rhonchi, or rales. Symmetric expansion. Abdomen: Bowel sounds present. Soft, Abdominal tenderness is present,  Dressing is present, no guarding or rigidity. extremities: No cyanosis or clubbing. No edema  Skin:     No rashes and normal coloration. Warm and dry. Neuro:  CN II-XII grossly intact. Sensation intact. A&Ox3  Psych:  Normal mood and affect.       I have personally reviewed labs and tests showing:  Recent Labs:  Recent Results (from the past 48 hour(s))   Basic Metabolic Panel w/ Reflex to MG    Collection Time: 09/21/22  3:44 AM   Result Value Ref Range Sodium 139 136 - 145 mmol/L    Potassium 4.0 3.5 - 5.1 mmol/L    Chloride 107 101 - 110 mmol/L    CO2 25 21 - 32 mmol/L    Anion Gap 7 4 - 13 mmol/L    Glucose 118 (H) 65 - 100 mg/dL    BUN 14 8 - 23 MG/DL    Creatinine 0.80 0.6 - 1.0 MG/DL    GFR African American >60 >60 ml/min/1.73m2    GFR Non- >60 >60 ml/min/1.73m2    Calcium 8.5 8.3 - 10.4 MG/DL   CBC with Auto Differential    Collection Time: 09/21/22  3:44 AM   Result Value Ref Range    WBC 9.2 4.3 - 11.1 K/uL    RBC 3.55 (L) 4.05 - 5.2 M/uL    Hemoglobin 11.5 (L) 11.7 - 15.4 g/dL    Hematocrit 34.5 (L) 35.8 - 46.3 %    MCV 97.2 79.6 - 97.8 FL    MCH 32.4 26.1 - 32.9 PG    MCHC 33.3 31.4 - 35.0 g/dL    RDW 13.3 11.9 - 14.6 %    Platelets 092 257 - 670 K/uL    MPV 9.9 9.4 - 12.3 FL    nRBC 0.00 0.0 - 0.2 K/uL    Differential Type AUTOMATED      Seg Neutrophils 82 (H) 43 - 78 %    Lymphocytes 10 (L) 13 - 44 %    Monocytes 8 4.0 - 12.0 %    Eosinophils % 0 (L) 0.5 - 7.8 %    Basophils 0 0.0 - 2.0 %    Immature Granulocytes 0 0.0 - 5.0 %    Segs Absolute 7.5 1.7 - 8.2 K/UL    Absolute Lymph # 0.9 0.5 - 4.6 K/UL    Absolute Mono # 0.8 0.1 - 1.3 K/UL    Absolute Eos # 0.0 0.0 - 0.8 K/UL    Basophils Absolute 0.0 0.0 - 0.2 K/UL    Absolute Immature Granulocyte 0.0 0.0 - 0.5 K/UL   Basic Metabolic Panel w/ Reflex to MG    Collection Time: 09/22/22  3:50 AM   Result Value Ref Range    Sodium 138 136 - 145 mmol/L    Potassium 4.0 3.5 - 5.1 mmol/L    Chloride 108 101 - 110 mmol/L    CO2 26 21 - 32 mmol/L    Anion Gap 4 4 - 13 mmol/L    Glucose 138 (H) 65 - 100 mg/dL    BUN 14 8 - 23 MG/DL    Creatinine 0.80 0.6 - 1.0 MG/DL    GFR African American >60 >60 ml/min/1.73m2    GFR Non- >60 >60 ml/min/1.73m2    Calcium 8.4 8.3 - 10.4 MG/DL   CBC with Auto Differential    Collection Time: 09/22/22  3:50 AM   Result Value Ref Range    WBC 15.2 (H) 4.3 - 11.1 K/uL    RBC 3.42 (L) 4.05 - 5.2 M/uL    Hemoglobin 11.0 (L) 11.7 - 15.4 g/dL    Hematocrit 33.4 (L) 35.8 - 46.3 %    MCV 97.7 79.6 - 97.8 FL    MCH 32.2 26.1 - 32.9 PG    MCHC 32.9 31.4 - 35.0 g/dL    RDW 13.2 11.9 - 14.6 %    Platelets 190 351 - 804 K/uL    MPV 9.4 9.4 - 12.3 FL    nRBC 0.00 0.0 - 0.2 K/uL    Differential Type AUTOMATED      Seg Neutrophils 88 (H) 43 - 78 %    Lymphocytes 5 (L) 13 - 44 %    Monocytes 7 4.0 - 12.0 %    Eosinophils % 0 (L) 0.5 - 7.8 %    Basophils 0 0.0 - 2.0 %    Immature Granulocytes 0 0.0 - 5.0 %    Segs Absolute 13.2 (H) 1.7 - 8.2 K/UL    Absolute Lymph # 0.8 0.5 - 4.6 K/UL    Absolute Mono # 1.1 0.1 - 1.3 K/UL    Absolute Eos # 0.0 0.0 - 0.8 K/UL    Basophils Absolute 0.0 0.0 - 0.2 K/UL    Absolute Immature Granulocyte 0.0 0.0 - 0.5 K/UL   Procalcitonin    Collection Time: 09/22/22  8:41 AM   Result Value Ref Range    Procalcitonin <0.05 0.00 - 0.49 ng/mL   Lactic Acid    Collection Time: 09/22/22  8:41 AM   Result Value Ref Range    Lactic Acid, Plasma 1.8 0.4 - 2.0 MMOL/L       I have personally reviewed imaging studies showing: Other Studies:  XR CHEST PORTABLE   Final Result   No acute abnormality.           Current Meds:  Current Facility-Administered Medications   Medication Dose Route Frequency    benzocaine-menthol (CEPACOL SORE THROAT) lozenge 1 lozenge  1 lozenge Oral Q2H PRN    phenol 1.4 % mouth spray 1 spray  1 spray Mouth/Throat Q2H PRN    acetaminophen (TYLENOL) tablet 1,000 mg  1,000 mg Oral 3 times per day    oxyCODONE (ROXICODONE) immediate release tablet 5 mg  5 mg Oral Q4H PRN    oxyCODONE (ROXICODONE) immediate release tablet 10 mg  10 mg Oral Q4H PRN    gabapentin (NEURONTIN) capsule 100 mg  100 mg Oral TID    medicated lip ointment (BLISTEX)   Topical PRN    sodium chloride flush 0.9 % injection 5-40 mL  5-40 mL IntraVENous 2 times per day    sodium chloride flush 0.9 % injection 5-40 mL  5-40 mL IntraVENous PRN    0.9 % sodium chloride infusion   IntraVENous PRN    potassium chloride (KLOR-CON M) extended release tablet 40 mEq  40 mEq Oral PRN Or    potassium bicarb-citric acid (EFFER-K) effervescent tablet 40 mEq  40 mEq Oral PRN    Or    potassium chloride 10 mEq/100 mL IVPB (Peripheral Line)  10 mEq IntraVENous PRN    potassium chloride 10 mEq/100 mL IVPB (Peripheral Line)  10 mEq IntraVENous PRN    magnesium sulfate 2000 mg in 50 mL IVPB premix  2,000 mg IntraVENous PRN    ondansetron (ZOFRAN-ODT) disintegrating tablet 4 mg  4 mg Oral Q8H PRN    Or    ondansetron (ZOFRAN) injection 4 mg  4 mg IntraVENous Q6H PRN    polyethylene glycol (GLYCOLAX) packet 17 g  17 g Oral BID    famotidine (PEPCID) tablet 10 mg  10 mg Oral Daily PRN    aluminum & magnesium hydroxide-simethicone (MAALOX) 200-200-20 MG/5ML suspension 30 mL  30 mL Oral Q6H PRN    [Held by provider] enoxaparin (LOVENOX) injection 40 mg  40 mg SubCUTAneous Daily    docusate sodium (COLACE) capsule 100 mg  100 mg Oral Daily    pantoprazole (PROTONIX) tablet 40 mg  40 mg Oral QAM AC    morphine injection 2 mg  2 mg IntraVENous Q3H PRN       Signed:  Nilda Angel MD    Part of this note may have been written by using a voice dictation software. The note has been proof read but may still contain some grammatical/other typographical errors.

## 2022-09-22 NOTE — PROGRESS NOTES
Advance Care Planning     Advance Care Planning Inpatient Note  [facility] Department    Today's Date: 9/22/2022  Unit: SFD 8 MED SURG    Received request from IDT Member. Upon review of chart and communication with care team, patient's decision making abilities are not in question. . Patient was/were present in the room during visit. Goals of ACP Conversation:  Discuss advance care planning documents    Health Care Decision Makers:     No healthcare decision makers have been documented. Click here to complete [address] including selection of the Healthcare Decision Maker Relationship (ie \"Primary\")  Summary:  Patient stated that Son has found her existing POA paperwork and is bringing it in for the hospital to document. Advance Care Planning Documents (Patient Wishes):  None     Assessment:   Patient is  and wants to name her son as HCPOA    Interventions:  Requested patient/family to submit existing document for our records: Healthcare Power of /Advance Directive Appointment of [address] Preferences Communicated:   No    Outcomes/Plan:  Patient's family plans to bring in existing documentation.    is available as needed    Electronically signed by Chaplain Torres on 9/22/2022 at 2:00 PM

## 2022-09-22 NOTE — PROGRESS NOTES
Pt is alert and oriented times 4. Pt is on 2L NC with even and unlabored respirations. Pt has family at the bedside. Pt abdominal dressing are clean and intact.

## 2022-09-22 NOTE — PROGRESS NOTES
Patient is resting in bed. Rt is on RA . Patient states feeling much better than earliere today. Family member by bedside. Call bell is within reach.  Preparing to give bedside report

## 2022-09-22 NOTE — PROGRESS NOTES
Physical Therapy Note:    Attempted to see patient this PM for physical therapy evaluation session. RN reporting pt needs to hold today due to increase pain and now on morphine. . Will follow and re-attempt as schedule permits/patient available.  Thank you,    Sravani Jackson, PT     Rehab Caseload Tracker

## 2022-09-22 NOTE — PROGRESS NOTES
Pt resting with complaints of abdominal pain, PRN 2mg morphine given IV. Pt is alert and oriented times 4. Call light is in place. Will continue to monitor.

## 2022-09-23 LAB
ANION GAP SERPL CALC-SCNC: 5 MMOL/L (ref 4–13)
BASOPHILS # BLD: 0 K/UL (ref 0–0.2)
BASOPHILS NFR BLD: 0 % (ref 0–2)
BUN SERPL-MCNC: 11 MG/DL (ref 8–23)
CALCIUM SERPL-MCNC: 8.3 MG/DL (ref 8.3–10.4)
CHLORIDE SERPL-SCNC: 100 MMOL/L (ref 101–110)
CO2 SERPL-SCNC: 27 MMOL/L (ref 21–32)
CREAT SERPL-MCNC: 0.7 MG/DL (ref 0.6–1)
DIFFERENTIAL METHOD BLD: ABNORMAL
EOSINOPHIL # BLD: 0.2 K/UL (ref 0–0.8)
EOSINOPHIL NFR BLD: 2 % (ref 0.5–7.8)
ERYTHROCYTE [DISTWIDTH] IN BLOOD BY AUTOMATED COUNT: 13.3 % (ref 11.9–14.6)
GLUCOSE SERPL-MCNC: 122 MG/DL (ref 65–100)
HCT VFR BLD AUTO: 32.7 % (ref 35.8–46.3)
HGB BLD-MCNC: 10.4 G/DL (ref 11.7–15.4)
IMM GRANULOCYTES # BLD AUTO: 0 K/UL (ref 0–0.5)
IMM GRANULOCYTES NFR BLD AUTO: 1 % (ref 0–5)
LYMPHOCYTES # BLD: 1.1 K/UL (ref 0.5–4.6)
LYMPHOCYTES NFR BLD: 14 % (ref 13–44)
MCH RBC QN AUTO: 31.1 PG (ref 26.1–32.9)
MCHC RBC AUTO-ENTMCNC: 31.8 G/DL (ref 31.4–35)
MCV RBC AUTO: 97.9 FL (ref 79.6–97.8)
MONOCYTES # BLD: 1 K/UL (ref 0.1–1.3)
MONOCYTES NFR BLD: 13 % (ref 4–12)
NEUTS SEG # BLD: 5.7 K/UL (ref 1.7–8.2)
NEUTS SEG NFR BLD: 70 % (ref 43–78)
NRBC # BLD: 0 K/UL (ref 0–0.2)
PLATELET # BLD AUTO: 222 K/UL (ref 150–450)
PMV BLD AUTO: 9.8 FL (ref 9.4–12.3)
POTASSIUM SERPL-SCNC: 3.9 MMOL/L (ref 3.5–5.1)
RBC # BLD AUTO: 3.34 M/UL (ref 4.05–5.2)
SODIUM SERPL-SCNC: 132 MMOL/L (ref 136–145)
WBC # BLD AUTO: 8.1 K/UL (ref 4.3–11.1)

## 2022-09-23 PROCEDURE — 6360000002 HC RX W HCPCS: Performed by: STUDENT IN AN ORGANIZED HEALTH CARE EDUCATION/TRAINING PROGRAM

## 2022-09-23 PROCEDURE — 36415 COLL VENOUS BLD VENIPUNCTURE: CPT

## 2022-09-23 PROCEDURE — 85025 COMPLETE CBC W/AUTO DIFF WBC: CPT

## 2022-09-23 PROCEDURE — 97165 OT EVAL LOW COMPLEX 30 MIN: CPT

## 2022-09-23 PROCEDURE — 97112 NEUROMUSCULAR REEDUCATION: CPT

## 2022-09-23 PROCEDURE — 96372 THER/PROPH/DIAG INJ SC/IM: CPT

## 2022-09-23 PROCEDURE — G0378 HOSPITAL OBSERVATION PER HR: HCPCS

## 2022-09-23 PROCEDURE — 6370000000 HC RX 637 (ALT 250 FOR IP): Performed by: INTERNAL MEDICINE

## 2022-09-23 PROCEDURE — 97530 THERAPEUTIC ACTIVITIES: CPT

## 2022-09-23 PROCEDURE — 6360000002 HC RX W HCPCS: Performed by: INTERNAL MEDICINE

## 2022-09-23 PROCEDURE — 97161 PT EVAL LOW COMPLEX 20 MIN: CPT

## 2022-09-23 PROCEDURE — 80048 BASIC METABOLIC PNL TOTAL CA: CPT

## 2022-09-23 PROCEDURE — 2580000003 HC RX 258: Performed by: INTERNAL MEDICINE

## 2022-09-23 PROCEDURE — 6370000000 HC RX 637 (ALT 250 FOR IP): Performed by: NURSE PRACTITIONER

## 2022-09-23 PROCEDURE — 96376 TX/PRO/DX INJ SAME DRUG ADON: CPT

## 2022-09-23 PROCEDURE — 97535 SELF CARE MNGMENT TRAINING: CPT

## 2022-09-23 RX ORDER — ENOXAPARIN SODIUM 100 MG/ML
40 INJECTION SUBCUTANEOUS DAILY
Status: DISCONTINUED | OUTPATIENT
Start: 2022-09-23 | End: 2022-09-26 | Stop reason: HOSPADM

## 2022-09-23 RX ADMIN — MORPHINE SULFATE 2 MG: 2 INJECTION, SOLUTION INTRAMUSCULAR; INTRAVENOUS at 08:11

## 2022-09-23 RX ADMIN — MORPHINE SULFATE 2 MG: 2 INJECTION, SOLUTION INTRAMUSCULAR; INTRAVENOUS at 19:05

## 2022-09-23 RX ADMIN — POLYETHYLENE GLYCOL 3350 17 G: 17 POWDER, FOR SOLUTION ORAL at 08:11

## 2022-09-23 RX ADMIN — SODIUM CHLORIDE, PRESERVATIVE FREE 10 ML: 5 INJECTION INTRAVENOUS at 21:34

## 2022-09-23 RX ADMIN — MORPHINE SULFATE 2 MG: 2 INJECTION, SOLUTION INTRAMUSCULAR; INTRAVENOUS at 14:23

## 2022-09-23 RX ADMIN — SODIUM CHLORIDE, PRESERVATIVE FREE 10 ML: 5 INJECTION INTRAVENOUS at 08:11

## 2022-09-23 RX ADMIN — PANTOPRAZOLE SODIUM 40 MG: 40 TABLET, DELAYED RELEASE ORAL at 05:20

## 2022-09-23 RX ADMIN — ALUMINUM HYDROXIDE, MAGNESIUM HYDROXIDE, AND SIMETHICONE 30 ML: 200; 200; 20 SUSPENSION ORAL at 11:56

## 2022-09-23 RX ADMIN — OXYCODONE 10 MG: 5 TABLET ORAL at 21:51

## 2022-09-23 RX ADMIN — GABAPENTIN 100 MG: 100 CAPSULE ORAL at 21:33

## 2022-09-23 RX ADMIN — POLYETHYLENE GLYCOL 3350 17 G: 17 POWDER, FOR SOLUTION ORAL at 21:34

## 2022-09-23 RX ADMIN — OXYCODONE 10 MG: 5 TABLET ORAL at 16:59

## 2022-09-23 RX ADMIN — ENOXAPARIN SODIUM 40 MG: 100 INJECTION SUBCUTANEOUS at 16:59

## 2022-09-23 RX ADMIN — BENZOCAINE AND MENTHOL 1 LOZENGE: 15; 3.6 LOZENGE ORAL at 08:16

## 2022-09-23 RX ADMIN — ACETAMINOPHEN 1000 MG: 500 TABLET, FILM COATED ORAL at 14:15

## 2022-09-23 RX ADMIN — GABAPENTIN 100 MG: 100 CAPSULE ORAL at 08:11

## 2022-09-23 RX ADMIN — OXYCODONE 10 MG: 5 TABLET ORAL at 05:21

## 2022-09-23 RX ADMIN — ACETAMINOPHEN 1000 MG: 500 TABLET, FILM COATED ORAL at 05:21

## 2022-09-23 RX ADMIN — OXYCODONE 10 MG: 5 TABLET ORAL at 11:51

## 2022-09-23 RX ADMIN — MORPHINE SULFATE 2 MG: 2 INJECTION, SOLUTION INTRAMUSCULAR; INTRAVENOUS at 03:34

## 2022-09-23 RX ADMIN — Medication: at 08:17

## 2022-09-23 RX ADMIN — ACETAMINOPHEN 1000 MG: 500 TABLET, FILM COATED ORAL at 21:34

## 2022-09-23 RX ADMIN — DOCUSATE SODIUM 100 MG: 100 CAPSULE, LIQUID FILLED ORAL at 08:11

## 2022-09-23 RX ADMIN — GABAPENTIN 100 MG: 100 CAPSULE ORAL at 14:15

## 2022-09-23 ASSESSMENT — PAIN DESCRIPTION - DESCRIPTORS
DESCRIPTORS: ACHING
DESCRIPTORS: SHARP
DESCRIPTORS: ACHING;CRAMPING
DESCRIPTORS: ACHING
DESCRIPTORS: DISCOMFORT
DESCRIPTORS: SHARP
DESCRIPTORS: ACHING
DESCRIPTORS: CRAMPING;DISCOMFORT
DESCRIPTORS: CRAMPING

## 2022-09-23 ASSESSMENT — PAIN SCALES - GENERAL
PAINLEVEL_OUTOF10: 0
PAINLEVEL_OUTOF10: 7
PAINLEVEL_OUTOF10: 10
PAINLEVEL_OUTOF10: 9
PAINLEVEL_OUTOF10: 8
PAINLEVEL_OUTOF10: 8
PAINLEVEL_OUTOF10: 3
PAINLEVEL_OUTOF10: 8
PAINLEVEL_OUTOF10: 5
PAINLEVEL_OUTOF10: 9
PAINLEVEL_OUTOF10: 6
PAINLEVEL_OUTOF10: 7
PAINLEVEL_OUTOF10: 10
PAINLEVEL_OUTOF10: 0
PAINLEVEL_OUTOF10: 6
PAINLEVEL_OUTOF10: 7
PAINLEVEL_OUTOF10: 9

## 2022-09-23 ASSESSMENT — PAIN DESCRIPTION - LOCATION
LOCATION: ABDOMEN

## 2022-09-23 ASSESSMENT — PAIN DESCRIPTION - ORIENTATION
ORIENTATION: OTHER (COMMENT)
ORIENTATION: MID
ORIENTATION: OTHER (COMMENT)
ORIENTATION: OTHER (COMMENT)
ORIENTATION: MID
ORIENTATION: OTHER (COMMENT)
ORIENTATION: RIGHT;LEFT
ORIENTATION: UPPER
ORIENTATION: MID

## 2022-09-23 ASSESSMENT — PAIN - FUNCTIONAL ASSESSMENT
PAIN_FUNCTIONAL_ASSESSMENT: ACTIVITIES ARE NOT PREVENTED

## 2022-09-23 NOTE — PROGRESS NOTES
Patient in bed with pain to abdomen with pain level of 8 and repositioned for comfort. Incision site dressings to abdomen is clean, dry and intact. 02 at 2 liters via Nc with RR even/unlabored.   Safety in place with call light in reach

## 2022-09-23 NOTE — PROGRESS NOTES
Patient awake resting in bed. Respirations present. On room air. No signs of distress. AxO x4. Patient c/o pain. PRN medication given. Bed low and locked. Call light within reach. Abdomen distention noted. Patient c/o little relief with PRN pain medication. Patient states she has had not had episode of flatulence. Renata Titus, general surgery NP, notified of the above via [employer]serve. Bedside report given to oncoming RNCortez.

## 2022-09-23 NOTE — PROGRESS NOTES
Hospitalist Progress Note   Admit Date:  2022  6:39 PM   Name:  Jerod Singh   Age:  81 y.o. Sex:  female  :  1940   MRN:  [id number]   Room:  [facility]/    Presenting Complaint: No chief complaint on file. Reason(s) for Admission: Intractable pain [R52]     Hospital Course:   Jerod Singh is a 81 y.o. female with medical history of chronic constipation, remote peptic ulcer duodenal  and significant endometriosis. She has had a cervical laminectomy tonsillectomy and tubal ligation in the past but relatively benign medical history for her age. Who presented with 1-1/2-year history of postprandial abdominal discomfort-recently over the past several weeks increased abdominal discomfort and particularly right lower quadrant pain which progressed to the point where she sought care in an emergency department today at [facility] facility. Initially felt to be urinary source but urinalysis negative and CT abdomen and pelvis revealed sludge versus other and a very thin-walled hydropic gallbladder which prompted ultrasound which showed that the gallbladder is nearly completely filled with stones. Patient had intractable pain and would not be discharged home as recommended because of intractable pain requiring IV narcotics and [facility] [place] [facility] requested admission here for intractable pain. Surgery was notified and they recommended outpatient follow-up in the office. N.p.o. status IV hydration. CT imaging which did/does not show her gallstones that are well seen and fill her GB on US. Her GB is enlarged with a thin wall. Patient had laparoscopic cholecystectomy with ICG cholangiography by surgery on . Subjective & 24hr Events (22): Pt is seen and examined at the bedside. Pt states her abdominal pain is improving. Patient denies nausea, vomiting, diarrhea.     Assessment & Plan:      Cholelithiasis  S/p Laparoscopic cholecystectomy with ICG cholangiography POD 1   Pain management with Oxycodone moderate pain and IV narcotics for severe pain  Advanced the diet to clear liquids with ensure  Abdominal binder  Surgical following and appreciate recommendations    Leukocytosis improving  Most likely due to recent surgery   lactic acid, pro calcitonin normal.  Follow-up with blood cultures      Hyponatremia  Sodium of 132  Monitor sodium for now    Acute hypoxic respiratory failure  Patient is saturating well on 2 L of oxygen  Wean off oxygen as tolerated  RT assessed and she saturating 97% on room air  Incentive spirometer     Chronic constipation--MiraLAX twice daily stool softener-monitor for worsening with narcotics     History of duodenal ulcer disease remote. Continue  PPI daily. PT /OT recommended home health       Anticipated discharge needs: Anticipating hospital stay for 2 to 3 days         Diet:  ADULT ORAL NUTRITION SUPPLEMENT; Breakfast, Lunch, Dinner; Clear Liquid Oral Supplement  ADULT DIET; Full Liquid  DVT PPx: Lovenox  Code status: Full Code    Hospital Problems:  Principal Problem:    Intractable pain  Active Problems:    Calculus of gallbladder with acute on chronic cholecystitis without obstruction  Resolved Problems:    * No resolved hospital problems.  *      Objective:   Patient Vitals for the past 24 hrs:   Temp Pulse Resp BP SpO2   09/23/22 1452 97.5 °F (36.4 °C) 61 18 (!) 113/51 100 %   09/23/22 1423 -- -- 16 -- --   09/23/22 1151 -- -- 16 -- --   09/23/22 1111 97.5 °F (36.4 °C) 55 17 (!) 148/56 100 %   09/23/22 0811 -- -- 16 -- --   09/23/22 0708 97.7 °F (36.5 °C) 56 17 (!) 121/53 100 %   09/23/22 0334 97.7 °F (36.5 °C) 58 18 (!) 117/45 96 %   09/22/22 2334 98.1 °F (36.7 °C) 55 18 (!) 108/58 100 %   09/22/22 1943 97.5 °F (36.4 °C) 57 18 (!) 117/45 --   09/22/22 1716 -- -- 16 -- --       Oxygen Therapy  SpO2: 100 %  Pulse Oximeter Device Mode: Intermittent  Pulse Oximeter Device Location: Finger  O2 Device: Nasal cannula  O2 Flow Rate (L/min): 2 L/min    Estimated body mass index is 20.67 kg/m² as calculated from the following:    Height as of this encounter: 5' 7\" (1.702 m). Weight as of this encounter: 132 lb (59.9 kg). No intake or output data in the 24 hours ending 09/23/22 1540        Physical Exam:     Blood pressure (!) 113/51, pulse 61, temperature 97.5 °F (36.4 °C), temperature source Oral, resp. rate 18, height 5' 7\" (1.702 m), weight 132 lb (59.9 kg), SpO2 100 %. General:    Well nourished. Head:  Normocephalic, atraumatic  Eyes:  Sclerae appear normal.  Pupils equally round. ENT:  Nares appear normal, no drainage. Moist oral mucosa  Neck:  No restricted ROM. Trachea midline   CV:   RRR. No m/r/g. No jugular venous distension. Lungs:   CTAB. No wheezing, rhonchi, or rales. Symmetric expansion. Abdomen: Bowel sounds present. Soft, Abdominal tenderness is present,  Dressing is present, no guarding or rigidity. extremities: No cyanosis or clubbing. No edema  Skin:     No rashes and normal coloration. Warm and dry. Neuro:  CN II-XII grossly intact. Sensation intact. A&Ox3  Psych:  Normal mood and affect.       I have personally reviewed labs and tests showing:  Recent Labs:  Recent Results (from the past 48 hour(s))   Basic Metabolic Panel w/ Reflex to MG    Collection Time: 09/22/22  3:50 AM   Result Value Ref Range    Sodium 138 136 - 145 mmol/L    Potassium 4.0 3.5 - 5.1 mmol/L    Chloride 108 101 - 110 mmol/L    CO2 26 21 - 32 mmol/L    Anion Gap 4 4 - 13 mmol/L    Glucose 138 (H) 65 - 100 mg/dL    BUN 14 8 - 23 MG/DL    Creatinine 0.80 0.6 - 1.0 MG/DL    GFR African American >60 >60 ml/min/1.73m2    GFR Non- >60 >60 ml/min/1.73m2    Calcium 8.4 8.3 - 10.4 MG/DL   CBC with Auto Differential    Collection Time: 09/22/22  3:50 AM   Result Value Ref Range    WBC 15.2 (H) 4.3 - 11.1 K/uL    RBC 3.42 (L) 4.05 - 5.2 M/uL    Hemoglobin 11.0 (L) 11.7 - 15.4 g/dL    Hematocrit 33.4 (L) 35.8 - 46.3 %    MCV 97.7 79.6 - 97.8 FL    MCH 32.2 26.1 - 32.9 PG    MCHC 32.9 31.4 - 35.0 g/dL    RDW 13.2 11.9 - 14.6 %    Platelets 186 591 - 393 K/uL    MPV 9.4 9.4 - 12.3 FL    nRBC 0.00 0.0 - 0.2 K/uL    Differential Type AUTOMATED      Seg Neutrophils 88 (H) 43 - 78 %    Lymphocytes 5 (L) 13 - 44 %    Monocytes 7 4.0 - 12.0 %    Eosinophils % 0 (L) 0.5 - 7.8 %    Basophils 0 0.0 - 2.0 %    Immature Granulocytes 0 0.0 - 5.0 %    Segs Absolute 13.2 (H) 1.7 - 8.2 K/UL    Absolute Lymph # 0.8 0.5 - 4.6 K/UL    Absolute Mono # 1.1 0.1 - 1.3 K/UL    Absolute Eos # 0.0 0.0 - 0.8 K/UL    Basophils Absolute 0.0 0.0 - 0.2 K/UL    Absolute Immature Granulocyte 0.0 0.0 - 0.5 K/UL   Culture, Blood 1    Collection Time: 09/22/22  8:41 AM    Specimen: Blood   Result Value Ref Range    Special Requests RIGHT  Antecubital        Culture NO GROWTH AFTER 22 HOURS     Procalcitonin    Collection Time: 09/22/22  8:41 AM   Result Value Ref Range    Procalcitonin <0.05 0.00 - 0.49 ng/mL   Lactic Acid    Collection Time: 09/22/22  8:41 AM   Result Value Ref Range    Lactic Acid, Plasma 1.8 0.4 - 2.0 MMOL/L   Culture, Blood 1    Collection Time: 09/22/22  8:42 AM    Specimen: Blood   Result Value Ref Range    Special Requests LEFT  FOREARM        Culture NO GROWTH AFTER 22 HOURS     Basic Metabolic Panel w/ Reflex to MG    Collection Time: 09/23/22  3:59 AM   Result Value Ref Range    Sodium 132 (L) 136 - 145 mmol/L    Potassium 3.9 3.5 - 5.1 mmol/L    Chloride 100 (L) 101 - 110 mmol/L    CO2 27 21 - 32 mmol/L    Anion Gap 5 4 - 13 mmol/L    Glucose 122 (H) 65 - 100 mg/dL    BUN 11 8 - 23 MG/DL    Creatinine 0.70 0.6 - 1.0 MG/DL    GFR African American >60 >60 ml/min/1.73m2    GFR Non- >60 >60 ml/min/1.73m2    Calcium 8.3 8.3 - 10.4 MG/DL   CBC with Auto Differential    Collection Time: 09/23/22  3:59 AM   Result Value Ref Range    WBC 8.1 4.3 - 11.1 K/uL    RBC 3.34 (L) 4.05 - 5.2 M/uL    Hemoglobin 10.4 (L) 11.7 - 15.4 g/dL    Hematocrit IntraVENous PRN    magnesium sulfate 2000 mg in 50 mL IVPB premix  2,000 mg IntraVENous PRN    ondansetron (ZOFRAN-ODT) disintegrating tablet 4 mg  4 mg Oral Q8H PRN    Or    ondansetron (ZOFRAN) injection 4 mg  4 mg IntraVENous Q6H PRN    polyethylene glycol (GLYCOLAX) packet 17 g  17 g Oral BID    famotidine (PEPCID) tablet 10 mg  10 mg Oral Daily PRN    aluminum & magnesium hydroxide-simethicone (MAALOX) 200-200-20 MG/5ML suspension 30 mL  30 mL Oral Q6H PRN    [Held by provider] enoxaparin (LOVENOX) injection 40 mg  40 mg SubCUTAneous Daily    docusate sodium (COLACE) capsule 100 mg  100 mg Oral Daily    pantoprazole (PROTONIX) tablet 40 mg  40 mg Oral QAM AC    morphine injection 2 mg  2 mg IntraVENous Q3H PRN       Signed:  Clarissa Pineda MD    Part of this note may have been written by using a voice dictation software. The note has been proof read but may still contain some grammatical/other typographical errors.

## 2022-09-23 NOTE — CARE COORDINATION
MSN, CM:  patient had a laparoscopic cholecystectomy yesterday. Patient's discharge plan is home with [facility] when medically stable. Case Management will continue to follow.

## 2022-09-23 NOTE — PROGRESS NOTES
Received report from Cortez Naik, [facility]. Patient awake resting in bed. Respirations present. On 2 L NC. No signs of distress. AxO x4. No needs expressed. Bed low and locked. Call light within reach. Will continue to monitor.

## 2022-09-23 NOTE — PROGRESS NOTES
ACUTE OCCUPATIONAL THERAPY GOALS:   (Developed with and agreed upon by patient and/or caregiver.)  1. Patient will complete lower body bathing and dressing with MOD I and adaptive equipment as needed. 2.Patient will complete upper body bathing and dressing with MOD I and adaptive equipment as needed. 3. Patient will complete toileting with MOD I.   4. Patient will tolerate 30 minutes of OT treatment with 1-2 rest breaks to increase activity tolerance for ADLs. 5. Patient will complete functional transfers with MOD I and adaptive equipment as needed. 6. Patient will complete simple grooming task standing at the sink with MOD I. Timeframe: 7 visits      OCCUPATIONAL THERAPY Initial Assessment and Daily Note       OT Visit Days: 1  Acknowledge Orders  Time  OT Charge Capture  Rehab Caseload Tracker      Aminata Stern is a 81 y.o. female   PRIMARY DIAGNOSIS: Intractable pain  Intractable pain [R52]  Procedure(s) (LRB):  CHOLECYSTECTOMY LAPAROSCOPIC (N/A)  2 Days Post-Op  Reason for Referral: Generalized Muscle Weakness (M62.81)  Other lack of cordination (R27.8)  Difficulty in walking, Not elsewhere classified (R26.2)  Observation: Payor: [facility] MEDICARE / Plan: Alanna Curling / Product Type: *No Product type* /     ASSESSMENT:     REHAB RECOMMENDATIONS:   Recommendation to date pending progress:  Setting:  Home Health Therapy    Equipment:    To Be Determined     ASSESSMENT:  Ms. Rianna Kerr presented to the hospital with abdominal discomfort--pt is now s/p procedure listed above. At baseline, pt lives alone and is independent for all ADLs and IADls. Pt has a strong family support system and plans to d/c home with one of her sons until she is safe to live alone again. Today, pt was received supine in bed. Completed bed mobility and LB dressing with Lali. CGA for functional transfers and ambulation with RW. Performed toileting and grooming task standing at the sink with CGA.  Pt required increased time for all mobility and ADLs due to pain. Pt has an abdominal binder for comfort--wore it during session. Anticipate pt to progress quickly towards acute OT goals. Recommend return home with family support and [facility] therapy services if needed. Melinda Phelan currently demonstrates overall deficits in strength, balance, activity tolerance, and ADL performance. Patient would benefit from skilled OT services at this time in order to address functional deficits and OT goals stated above. [address] [facility] [address] [address] AM-PAC 6 Clicks Daily Activity Inpatient Short Form:    AM-PAC Daily Activity Inpatient   How much help for putting on and taking off regular lower body clothing?: A Little  How much help for Bathing?: A Little  How much help for Toileting?: A Little  How much help for putting on and taking off regular upper body clothing?: A Little  How much help for taking care of personal grooming?: A Little  How much help for eating meals?: None  AM-[facility] Inpatient Daily Activity Raw Score: 19  AM-PAC Inpatient ADL T-Scale Score : 40.22  ADL Inpatient CMS 0-100% Score: 42.8  ADL Inpatient CMS G-Code Modifier : CK     SUBJECTIVE:     Ms. Rush Santoyo states, \"Oh I want that binder over the ice. \"     Social/Functional Lives With: Alone  Type of Home: House  Home Layout: One level  Home Access: Ramped entrance  Bathroom Shower/Tub: Walk-in shower  [employer] Help From: Family  ADL Assistance: Independent  Homemaking Assistance: Independent  Homemaking Responsibilities: Yes  Ambulation Assistance: Independent  Transfer Assistance: Independent  Active : Yes  Mode of Transportation: [place]  Occupation: Retired, Part time employment  Type of Occupation: Teacher    OBJECTIVE:     Magdalena Preciado / Zack Young / Mariia Orn: NA    RESTRICTIONS/PRECAUTIONS:  Restrictions/Precautions: Surgical Protocols    PAIN: VITALS / O2:   Pre Treatment:   Pain Assessment: 0-10  Pain Level: 8  Pain Location: Abdomen      Post Treatment: no change--RN aware Vitals          Oxygen            GROSS EVALUATION: INTACT IMPAIRED   (See Comments)   UE AROM [x] []   UE PROM [x] []   Strength []  Generalized weakness, functional for bADLs     Posture / Balance []  Fair+ sitting and standing    Sensation [x]     Coordination []       Tone [x]       Edema [x]    Activity Tolerance []  Limited by pain      Hand Dominance R [] L []      COGNITION/  PERCEPTION: INTACT IMPAIRED   (See Comments)   Orientation [x]     Vision [x]     Hearing [x]     Cognition  [x]     Perception [x]       MOBILITY: I Mod I S SBA CGA Min Mod Max Total  NT x2 Comments:   Bed Mobility    Rolling [] [] [] [] [] [x] [] [] [] [] []    Supine to Sit [] [] [] [] [] [x] [] [] [] [] [] Via log roll   Scooting [] [] [] [] [] [] [] [] [] [x] []    Sit to Supine [] [] [] [] [x] [] [] [] [] [] [] Via reverse log roll   Transfers    Sit to Stand [] [] [] [] [x] [] [] [] [] [] []    Bed to Chair [] [] [] [] [] [] [] [] [] [x] []    Stand to Sit [] [] [] [] [x] [] [] [] [] [] []    Tub/Shower [] [] [] [] [] [] [] [] [] [x] []     Toilet [] [] [] [] [x] [] [] [] [] [] []      [] [] [] [] [] [] [] [] [] [] []    I=Independent, Mod I=Modified Independent, S=Supervision/Setup, SBA=Standby Assistance, CGA=Contact Guard Assistance, Min=Minimal Assistance, Mod=Moderate Assistance, Max=Maximal Assistance, Total=Total Assistance, NT=Not Tested    ACTIVITIES OF DAILY LIVING: I Mod I S SBA CGA Min Mod Max Total NT Comments   BASIC ADLs:              Upper Body Bathing  [] [] [] [] [] [] [] [] [] [x]    Lower Body Bathing [] [] [] [] [] [] [] [] [] [x]    Toileting [] [] [] [] [x] [] [] [] [] []    Upper Body Dressing [] [] [] [] [] [] [] [] [] [x]    Lower Body Dressing [] [] [] [] [] [x] [] [] [] [] Socks    Feeding [] [] [] [] [] [] [] [] [] [x]    Grooming [] [] [] [] [x] [] [] [] [] [] Washed hands standing at the sink   Personal Device Care [] [] [] [] [] [] [] [] [] [x]    Functional Mobility [] [] [] [] [x] [] [] [] [] [] RW   I=Independent, Mod I=Modified Independent, S=Supervision/Setup, SBA=Standby Assistance, CGA=Contact Guard Assistance, Min=Minimal Assistance, Mod=Moderate Assistance, Max=Maximal Assistance, Total=Total Assistance, NT=Not Tested    PLAN:   [facility]: 3 times/week for duration of hospital stay or until stated goals are met, whichever comes first.    PROBLEM LIST:   (Skilled intervention is medically necessary to address:)  Decreased ADL/Functional Activities  Decreased Activity Tolerance  Decreased Balance  Decreased Coordination  Decreased Safety Awareness  Decreased Strength  Decreased Transfer Abilities   INTERVENTIONS PLANNED:  (Benefits and precautions of occupational therapy have been discussed with the patient.)  Self Care Training  Therapeutic Activity  Therapeutic Exercise/HEP  Neuromuscular Re-education  Manual Therapy  Education         TREATMENT:     EVALUATION: LOW COMPLEXITY: (Untimed Charge)    TREATMENT:   Co-Treatment PT/OT necessary due to patient's decreased overall endurance/tolerance levels, as well as need for high level skilled assistance to complete functional transfers/mobility and functional tasks  Neuromuscular Re-education (10 Minutes): Neuromuscular Re-education included Balance Training, Coordination training, Postural training, Sitting balance training, and Standing balance training to improve Balance, Coordination, Functional Mobility, and Postural Control. Self Care (10 minutes): Patient participated in toileting, lower body dressing, and grooming ADLs in unsupported sitting and standing with minimal verbal cueing to increase independence, decrease assistance required, increase activity tolerance, and increase safety awareness. Patient also participated in functional mobility and functional transfer training to increase independence, decrease assistance required, increase activity tolerance, and increase safety awareness.      TREATMENT GRID:  N/A    AFTER TREATMENT PRECAUTIONS: Bed, Call light within reach, Needs within reach, and RN notified    INTERDISCIPLINARY COLLABORATION:  RN/ PCT, PT/ PTA, and OT/ TATE    EDUCATION:  Education Given To: Patient  Education Provided: Role of Therapy;Plan of Care; Fall Prevention Strategies  Education Outcome: Verbalized understanding;Demonstrated understanding    TOTAL TREATMENT DURATION AND TIME:  Time In: 1107  Time Out: [address]  Minutes: [facility], OT

## 2022-09-23 NOTE — PROGRESS NOTES
Morphine 2 mg IV given for abdominal pain with pain level at 9 and will assess med with call light in reach

## 2022-09-23 NOTE — PROGRESS NOTES
Roxicodone 10 mg po given for abdominal pain per pt request and will assess med with call light n reach

## 2022-09-23 NOTE — PROGRESS NOTES
ACUTE PHYSICAL THERAPY GOALS:   (Developed with and agreed upon by patient and/or caregiver. )  LTG:  (1.)Ms. Lissett Deluna will move from supine to sit and sit to supine , scoot up and down, and roll side to side in bed with STAND BY ASSIST within 7 treatment day(s). (2.)Ms. Lissett Deluna will transfer from bed to chair and chair to bed with STAND BY ASSIST using the least restrictive device within 7 treatment day(s). (3.)Ms. Lissett Deluna will ambulate with STAND BY ASSIST for 500+ feet with the least restrictive device within 7 treatment day(s). ________________________________________________________________________________________________       PHYSICAL THERAPY Initial Assessment and AM  (Link to Caseload Tracking: PT Visit Days : 1  Acknowledge Orders  Time In/Out  PT Charge Capture  Rehab Caseload Tracker    Zena Cruz is a 81 y.o. female   PRIMARY DIAGNOSIS: Intractable pain  Intractable pain [R52]  Procedure(s) (LRB):  CHOLECYSTECTOMY LAPAROSCOPIC (N/A)  2 Days Post-Op  Reason for Referral: Difficulty in walking, Not elsewhere classified (R26.2)  Observation: Payor: Otnoiel Hanson / Plan: Cherl Slice / Product Type: *No Product type* /     ASSESSMENT:     REHAB RECOMMENDATIONS:   Recommendation to date pending progress:  Setting:  Home Health Therapy-if needed    Equipment:    To Be Determined     ASSESSMENT:  Ms. Lissett Deluna presents with decreased bed mobility, transfers, ambulation, balance, and overall general functional mobility s/p above surgery on 9/21/22. Pt reports pain controlled currently in supine, would like abdominal binder donned for ambulation. Pt log rolled to L side with MIN A, cues for process. Pt CGA to MIN A transfers and ambulation. Pt shuffled steps without RW, improved with use of RW in hallway for 250'. Pt cues for posture and pacing. Pt returned to room, assisted to toilet with CGA, then back to sidelying position with CGA. Pt overall moving well, generally sore post surgery. PT to follow for acute care needs to address deficits noted above. Possible HHPT at discharge pending progress with mobility.       [address]\"" [address] [id number] [facility] 6 Clicks Basic Mobility Inpatient Short Form  AM-PAC Mobility Inpatient   How much difficulty turning over in bed?: A Little  How much difficulty sitting down on / standing up from a chair with arms?: A Little  How much difficulty moving from lying on back to sitting on side of bed?: A Little  How much help from another person moving to and from a bed to a chair?: A Little  How much help from another person needed to walk in hospital room?: A Little  How much help from another person for climbing 3-5 steps with a railing?: A Little  [facility] Inpatient Mobility Raw Score : 18  AM-PAC Inpatient T-Scale Score : 43.63  Mobility Inpatient CMS 0-100% Score: 46.58  Mobility Inpatient CMS G-Code Modifier : CK    SUBJECTIVE:   Ms. Pee Pabon states, \"I am no stranger to pain\"     Social/Functional Lives With: Alone  Type of Home: House  Home Layout: One level  Home Access: Ramped entrance  Bathroom Shower/Tub: Walk-in shower  Receives Help From: Family  ADL Assistance: Independent  Homemaking Assistance: Independent  Homemaking Responsibilities: Yes  Ambulation Assistance: Independent  Transfer Assistance: Independent  Active : Yes  Mode of Transportation: [employer]  Occupation: Retired, Part time employment  Type of Occupation: Teacher    OBJECTIVE:     PAIN: Rhunette Began / O2: PRECAUTION / Cinthia Lightning / Caleen Independence:   Pre Treatment:   Pain Assessment: 0-10  Pain Level: 5      Post Treatment: 5/10 Vitals        Oxygen  O2 Therapy: Oxygen  O2 Flow Rate (L/min): 2 L/min   None    RESTRICTIONS/PRECAUTIONS:  Restrictions/Precautions: Surgical Protocols                 GROSS EVALUATION: Intact Impaired (Comments):   AROM [x]     PROM [x]    Strength [x]     Balance [] Standing - Static: Fair, Good  Standing - Dynamic: Fair, +   Posture [] Rounded Shoulders   Sensation [x]     Coordination [x] Tone [x]     Edema [] mild   Activity Tolerance []  General fatigue    []      COGNITION/  PERCEPTION: Intact Impaired (Comments):   Orientation [x]     Vision [x]     Hearing [x]     Cognition  [x]       MOBILITY: I Mod I S SBA CGA Min Mod Max Total  NT x2 Comments:   Bed Mobility    Rolling [] [] [] [] [] [x] [] [] [] [] [] Log rolling   Supine to Sit [] [] [] [] [] [x] [] [] [] [] []    Scooting [] [] [] [] [x] [] [] [] [] [] []    Sit to Supine [] [] [] [] [] [x] [] [] [] [] [] Positioned sidelying   Transfers    Sit to Stand [] [] [] [] [x] [x] [] [] [] [] []    Bed to Chair [] [] [] [] [] [] [] [] [] [x] []    Stand to Sit [] [] [] [] [x] [x] [] [] [] [] []     [] [] [] [] [] [] [] [] [] [] []    I=Independent, Mod I=Modified Independent, S=Supervision, SBA=Standby Assistance, CGA=Contact Guard Assistance,   Min=Minimal Assistance, Mod=Moderate Assistance, Max=Maximal Assistance, Total=Total Assistance, NT=Not Tested    GAIT: I Mod I S SBA CGA Min Mod Max Total  NT x2 Comments:   Level of Assistance [] [] [] [] [x] [x] [] [] [] [] []    Distance 250 feet    DME Rolling Walker    Gait Quality Decreased rubio , Decreased step clearance, and Decreased step length    Weightbearing Status Restrictions/Precautions  Restrictions/Precautions: Surgical Protocols    Stairs      I=Independent, Mod I=Modified Independent, S=Supervision, SBA=Standby Assistance, CGA=Contact Guard Assistance,   Min=Minimal Assistance, Mod=Moderate Assistance, Max=Maximal Assistance, Total=Total Assistance, NT=Not Tested    PLAN:   FREQUENCY AND DURATION: 3 times/week for duration of hospital stay or until stated goals are met, whichever comes first.    THERAPY PROGNOSIS: Good    PROBLEM LIST:   (Skilled intervention is medically necessary to address:)  Decreased ADL/Functional Activities  Decreased Activity Tolerance  Decreased Balance  Decreased Gait Ability  Decreased Transfer Abilities INTERVENTIONS PLANNED:   (Benefits and precautions of physical therapy have been discussed with the patient.)  Therapeutic Activity  Therapeutic Exercise/HEP  Neuromuscular Re-education  Gait Training  Education       TREATMENT:   EVALUATION: LOW COMPLEXITY: (Untimed Charge)    TREATMENT:   Co-Treatment PT/OT necessary due to patient's decreased overall endurance/tolerance levels, as well as need for high level skilled assistance to complete functional transfers/mobility and functional tasks  Therapeutic Activity (23 Minutes): Therapeutic activity included Rolling, Supine to Sit, Sit to Supine, Scooting, Transfer Training, Ambulation on level ground, Sitting balance , and Standing balance to improve functional Activity tolerance, Balance, Coordination, Mobility, and Strength.     TREATMENT GRID:  N/A    AFTER TREATMENT PRECAUTIONS: Bed, Bed/Chair Locked, Call light within reach, Needs within reach, RN notified, and Side rails x3    INTERDISCIPLINARY COLLABORATION:  RN/ PCT, PT/ PTA, and OT/ TATE    EDUCATION: Education Given To: Patient  Education Provided: Transfer Training;Role of Therapy  Education Method: Verbal  Barriers to Learning: None  Education Outcome: Verbalized understanding    TIME IN/OUT:  Time In: 1107  Time Out: [address]  Minutes: Carolee Nolasco 82, PT

## 2022-09-23 NOTE — PROGRESS NOTES
Roxicodone 10 mg po given for abdominal pain with pain level at 10 and will assess patient.   Call light in reach

## 2022-09-23 NOTE — PROGRESS NOTES
[address] [place] Flores, [address] [place] [address], [address] [facility]  [phone]    Office Note/Consult Note   Pepito Childress   MRN: [id number]     : 1940        HPI: Pepito Childress is a 81 y.o. female who is seen at request of Dr. Bartolo Boston for intractable pain from cholelithiasis. She has had years of symptoms that could have been biliary colic vs. IBS vs. Chronic constipation. She has h/o endometriosis. She has had CT imaging which did/does not show her gallstones that are well seen and fill her GB on US. Her GB is enlarged with a thin wall. She has several medical issues, but no prior abdominal surgeries. She is currently without pain, but would like to proceed with cholecystectomy. I have personally reviewed images including previous CT which did not describe the enlarged GB present. It is only slightly smaller than current scan.    22 CHOLECYSTECTOMY LAPAROSCOPIC WITH ICG CHOLANGIOGRAPHY    21 POD1: \"thirsty\" with abdominal pain as expected. VSS/AF. Abd: soft, midline and trochar site dressings CDI. Tenderness with exam.   WBC 15, Hgb 11.2, Cr 0.8    22 POD2: midline and trocar drsg are c/d/I with no wilber incisional signs of infection. Tolerating clear liquid diet with no N or V. Endorses flatus. Abd pain 10/10 at worst but moderately controlled with tylenol and Neurontin as pt does not want to take narcotic pain medication.  Cr 0.70 wbc 8.1 hgb 10.4  Past Medical History:   Diagnosis Date    Constipation     Duodenal ulcer     Endometriosis      Past Surgical History:   Procedure Laterality Date    CERVICAL LAMINECTOMY      CHOLECYSTECTOMY, LAPAROSCOPIC N/A 2022    CHOLECYSTECTOMY LAPAROSCOPIC performed by Nalini Hernandez MD at [facility] MAIN OR    TONSILLECTOMY      TUBAL LIGATION       Current Facility-Administered Medications   Medication Dose Route Frequency    benzocaine-menthol (CEPACOL SORE THROAT) lozenge 1 lozenge  1 lozenge Oral Q2H PRN    phenol 1.4 % mouth spray 1 spray  1 spray Mouth/Throat Q2H PRN    acetaminophen (TYLENOL) tablet 1,000 mg  1,000 mg Oral 3 times per day    oxyCODONE (ROXICODONE) immediate release tablet 5 mg  5 mg Oral Q4H PRN    oxyCODONE (ROXICODONE) immediate release tablet 10 mg  10 mg Oral Q4H PRN    gabapentin (NEURONTIN) capsule 100 mg  100 mg Oral TID    medicated lip ointment (BLISTEX)   Topical PRN    sodium chloride flush 0.9 % injection 5-40 mL  5-40 mL IntraVENous 2 times per day    sodium chloride flush 0.9 % injection 5-40 mL  5-40 mL IntraVENous PRN    0.9 % sodium chloride infusion   IntraVENous PRN    potassium chloride (KLOR-CON M) extended release tablet 40 mEq  40 mEq Oral PRN    Or    potassium bicarb-citric acid (EFFER-K) effervescent tablet 40 mEq  40 mEq Oral PRN    Or    potassium chloride 10 mEq/100 mL IVPB (Peripheral Line)  10 mEq IntraVENous PRN    potassium chloride 10 mEq/100 mL IVPB (Peripheral Line)  10 mEq IntraVENous PRN    magnesium sulfate 2000 mg in 50 mL IVPB premix  2,000 mg IntraVENous PRN    ondansetron (ZOFRAN-ODT) disintegrating tablet 4 mg  4 mg Oral Q8H PRN    Or    ondansetron (ZOFRAN) injection 4 mg  4 mg IntraVENous Q6H PRN    polyethylene glycol (GLYCOLAX) packet 17 g  17 g Oral BID    famotidine (PEPCID) tablet 10 mg  10 mg Oral Daily PRN    aluminum & magnesium hydroxide-simethicone (MAALOX) 200-200-20 MG/5ML suspension 30 mL  30 mL Oral Q6H PRN    [Held by provider] enoxaparin (LOVENOX) injection 40 mg  40 mg SubCUTAneous Daily    docusate sodium (COLACE) capsule 100 mg  100 mg Oral Daily    pantoprazole (PROTONIX) tablet 40 mg  40 mg Oral QAM AC    morphine injection 2 mg  2 mg IntraVENous Q3H PRN     ALLERGIES:  Sulfamethoxazole-trimethoprim and Oxycodone-acetaminophen    Social History     Socioeconomic History    Marital status:      Spouse name: None    Number of children: None    Years of education: None    Highest education level: None   Tobacco Use    Smoking status: Never    Smokeless tobacco: Never   Substance and Sexual Activity    Alcohol use: No    Drug use: No     Social History     Tobacco Use   Smoking Status Never   Smokeless Tobacco Never     Family History   Problem Relation Age of Onset    Diabetes Brother     Diabetes Father     Heart Disease Mother     Heart Disease Brother        ROS: The patient has no difficulty with chest pain or shortness of breath. No fever or chills. The patient denies any personal or family history of abnormal clotting or bleeding. Comprehensive review of systems was otherwise unremarkable except as noted above. Physical Exam:   Constitutional: Alert oriented cooperative patient in no acute distress. BP (!) 148/56   Pulse 55   Temp 97.5 °F (36.4 °C) (Oral)   Resp 16   Ht 5' 7\" (1.702 m)   Wt 132 lb (59.9 kg)   SpO2 100%   BMI 20.67 kg/m²   Eyes:Sclera are clear without icterus. ENMT: no obvious neck masses, no ear or lip lesions  CV: RRR. Normal perfusion  Resp: No JVD. Breathing is  non-labored. GI: thin, soft and non-distended with midline and trochar dressing sites CDI. Bowel sounds active. +flatus  Musculoskeletal: unremarkable with normal function.    Neuro:  No obvious focal deficits  Psychiatric: normal affect and mood, no memory impairment    Lab Results   Component Value Date/Time    WBC 8.1 09/23/2022 03:59 AM    HGB 10.4 09/23/2022 03:59 AM    HCT 32.7 09/23/2022 03:59 AM     09/23/2022 03:59 AM    MCV 97.9 09/23/2022 03:59 AM       Lab Results   Component Value Date     (L) 09/23/2022    K 3.9 09/23/2022     (L) 09/23/2022    CO2 27 09/23/2022    BUN 11 09/23/2022    CREATININE 0.70 09/23/2022    GLUCOSE 122 (H) 09/23/2022    CALCIUM 8.3 09/23/2022    PROT 6.9 09/20/2022    LABALBU 4.6 09/20/2022    BILITOT 0.5 09/20/2022    ALKPHOS 105 09/20/2022    AST 28 09/20/2022    ALT 28 09/20/2022    LABGLOM >60 09/23/2022    GFRAA >60 09/23/2022 AGRATIO 0.9 (L) 03/17/2022    GLOB 2.3 09/20/2022         CT Result (most recent):  CT ABDOMEN PELVIS W IV CONTRAST 09/20/2022    Narrative  CT OF THE ABDOMEN AND PELVIS WITH CONTRAST. CLINICAL INDICATION: Right lower quadrant abdominal pain    PROCEDURE: Serial thin section axial images obtained from the lung bases through  the proximal femurs following the administration of 100 cc of Isovue 370  intravenous contrast.  Radiation dose reduction techniques were used for this  study. Our CT scanners use one or all of the following: Automated exposure  control, adjusted of the mA and/or kV according to patient size, iterative  reconstruction    COMPARISON: CT abdomen and pelvis dated 8/21/2020    FINDINGS:    CT ABDOMEN: The gallbladder is very distended with a small amount of either  dependent sludge or small gallstones. The gallbladder wall is thin. No  pericholecystic inflammatory fat stranding. The appendix is normal. The bowel is  normal in course, caliber, and wall thickness. The stomach is decompressed. The  pancreas is normal. The liver and spleen are unremarkable. The kidneys are  symmetric in size and contrast enhancement. There is no hydronephrosis. The  adrenal glands are normal. There is no ascites or adenopathy. CT PELVIS: The bladder is very distended with smooth thin walls. The uterus and  adnexa are unremarkable. The rectum is normal. No free fluid accumulating  filling the pelvis. There is no inguinal hernia. No aggressive osseous lesions identified. Impression  1. Hydropic gallbladder with thin walls. Dependent sludge or small stones noted  in the gallbladder fundus. If there is clinical concern for cholecystitis,  consider further evaluation with right upper quadrant abdominal ultrasound. 2. Otherwise no acute abdominal or pelvic abnormality.  The appendix is normal.      US Result (most recent):  US ABDOMEN LIMITED 09/20/2022    Narrative  Right upper quadrant abdominal ultrasound. CLINICAL INDICATION: Cholelithiasis    PROCEDURE: Realtime grayscale and color Doppler evaluation of the right upper  abdominal quadrant. COMPARISON: CT of the abdomen and pelvis dated 9/20/2022    FINDINGS: The liver is normal in size and echogenicity without focal lesion the  gallbladder is distended and near completely full of gallstones. The gallbladder  wall is thin at 2 mm. A negative sonographic Joy Dauer sign is reported. The common  bile duct measures 3.7 mm. The imaged portion the pancreas is unremarkable. The  right kidney is normal in size measuring 10.8 cm. There is no hydronephrosis. Echogenicity is normal. The aorta and IVC are patent and unremarkable. Impression  Distended gallbladder with cholelithiasis. The gallbladder is near  completely filled with stones. Assessment/Plan:     Milla Garcia is a 81 y.o. female who presents with recurrent abdominal pain and cholelithiasis with very enlarged GB. Hopefully symptoms are related to GB and stones as pathology. Unlikely that GB is functioning and will proceed with cholecystectomy. She has no jaundice. Strong possibility that procedure will convert to open with size limitation of GB. We discussed proceeding with laparoscopic, possible open cholecystectomy. We will give ICG for immunofluorescent cholangiography. I discussed the patient's condition and treatment options with the patient. I discussed risks of surgery in language the patient could understand including bleeding, infection, need for further surgery, abscess, fistula, SBO, DVT, PE, heart attack, stroke, renal failure, respiratory failure, ventilatory dependence, and death. The patient voiced understanding of all this and all questions were answered. Alternatives to surgery were discussed also and risks of the alternatives. The patient requested that we proceed with surgery. Informed consent was obtained.        Cholelithiasis with chronic cholecystitis  S/P 9/21/22 CHOLECYSTECTOMY LAPAROSCOPIC WITH ICG CHOLANGIOGRAPHY  >doing well  >CLD diet with IVFs (CLD for several days)- will add clear ensure with meals   >monitor for pain and treat   >Dressings off 5 days after surgery  >IS- pt able to inspire 1500 ml volume 9/23/22        Patient Active Problem List    Diagnosis Date Noted    Calculus of gallbladder with acute on chronic cholecystitis without obstruction 09/21/2022     Priority: Medium    Intractable pain 09/20/2022     Priority: Medium    Syncope 03/17/2022    SOB (shortness of breath) 03/17/2022    Acute gastric ulcer without hemorrhage or perforation 04/16/2021    Stage 3a chronic kidney disease ([facility] [address] 04/16/2021    Pain of right sacroiliac joint 01/05/2021    Right hip pain 01/05/2021    Grief 01/05/2021    Irritable bowel syndrome with both constipation and diarrhea 01/05/2021    Right lower quadrant abdominal pain 08/17/2020    Endometriosis 08/17/2020    Vitamin D deficiency 07/23/2020    Caregiver with fatigue 07/23/2020    Dental caries 07/23/2020    Flu-like symptoms 02/10/2020    Acute non-recurrent maxillary sinusitis 02/10/2020    Non-recurrent acute serous otitis media of both ears 02/10/2020    Osteopenia of neck of left femur 08/23/2019     T -2.3 August 2019  Repeat 2021        Paronychia of finger of right hand 08/30/2018    Sciatica of right side 04/03/2018    Raynaud's disease without gangrene 04/03/2018    Chronic neck pain 04/03/2018    Encounter for monitoring chronic NSAID therapy 04/03/2018    Chronic fatigue 04/03/2018    Exposure to varicella zoster virus (VZV) 04/03/2018          TYREE Rincon - NP,  FACS

## 2022-09-23 NOTE — PROGRESS NOTES
Pain relieved with patient resting in bed with no distress with call light in reach and will prepare bedside shift report for oncoming nurse

## 2022-09-24 LAB
ANION GAP SERPL CALC-SCNC: 5 MMOL/L (ref 4–13)
BASOPHILS # BLD: 0 K/UL (ref 0–0.2)
BASOPHILS NFR BLD: 1 % (ref 0–2)
BUN SERPL-MCNC: 8 MG/DL (ref 8–23)
CALCIUM SERPL-MCNC: 8.5 MG/DL (ref 8.3–10.4)
CHLORIDE SERPL-SCNC: 103 MMOL/L (ref 101–110)
CO2 SERPL-SCNC: 28 MMOL/L (ref 21–32)
CREAT SERPL-MCNC: 0.6 MG/DL (ref 0.6–1)
DIFFERENTIAL METHOD BLD: ABNORMAL
EOSINOPHIL # BLD: 0.2 K/UL (ref 0–0.8)
EOSINOPHIL NFR BLD: 3 % (ref 0.5–7.8)
ERYTHROCYTE [DISTWIDTH] IN BLOOD BY AUTOMATED COUNT: 13 % (ref 11.9–14.6)
GLUCOSE SERPL-MCNC: 105 MG/DL (ref 65–100)
HCT VFR BLD AUTO: 30.6 % (ref 35.8–46.3)
HGB BLD-MCNC: 10.2 G/DL (ref 11.7–15.4)
IMM GRANULOCYTES # BLD AUTO: 0 K/UL (ref 0–0.5)
IMM GRANULOCYTES NFR BLD AUTO: 0 % (ref 0–5)
LYMPHOCYTES # BLD: 0.9 K/UL (ref 0.5–4.6)
LYMPHOCYTES NFR BLD: 14 % (ref 13–44)
MCH RBC QN AUTO: 31.9 PG (ref 26.1–32.9)
MCHC RBC AUTO-ENTMCNC: 33.3 G/DL (ref 31.4–35)
MCV RBC AUTO: 95.6 FL (ref 79.6–97.8)
MONOCYTES # BLD: 0.9 K/UL (ref 0.1–1.3)
MONOCYTES NFR BLD: 15 % (ref 4–12)
NEUTS SEG # BLD: 4.4 K/UL (ref 1.7–8.2)
NEUTS SEG NFR BLD: 67 % (ref 43–78)
NRBC # BLD: 0 K/UL (ref 0–0.2)
PLATELET # BLD AUTO: 213 K/UL (ref 150–450)
PMV BLD AUTO: 9.9 FL (ref 9.4–12.3)
POTASSIUM SERPL-SCNC: 3.8 MMOL/L (ref 3.5–5.1)
RBC # BLD AUTO: 3.2 M/UL (ref 4.05–5.2)
SODIUM SERPL-SCNC: 136 MMOL/L (ref 136–145)
WBC # BLD AUTO: 6.4 K/UL (ref 4.3–11.1)

## 2022-09-24 PROCEDURE — 80048 BASIC METABOLIC PNL TOTAL CA: CPT

## 2022-09-24 PROCEDURE — 6370000000 HC RX 637 (ALT 250 FOR IP): Performed by: NURSE PRACTITIONER

## 2022-09-24 PROCEDURE — 36415 COLL VENOUS BLD VENIPUNCTURE: CPT

## 2022-09-24 PROCEDURE — 6360000002 HC RX W HCPCS: Performed by: STUDENT IN AN ORGANIZED HEALTH CARE EDUCATION/TRAINING PROGRAM

## 2022-09-24 PROCEDURE — 85025 COMPLETE CBC W/AUTO DIFF WBC: CPT

## 2022-09-24 PROCEDURE — 2580000003 HC RX 258: Performed by: INTERNAL MEDICINE

## 2022-09-24 PROCEDURE — 96372 THER/PROPH/DIAG INJ SC/IM: CPT

## 2022-09-24 PROCEDURE — G0378 HOSPITAL OBSERVATION PER HR: HCPCS

## 2022-09-24 PROCEDURE — 6370000000 HC RX 637 (ALT 250 FOR IP): Performed by: INTERNAL MEDICINE

## 2022-09-24 RX ADMIN — GABAPENTIN 100 MG: 100 CAPSULE ORAL at 09:53

## 2022-09-24 RX ADMIN — ACETAMINOPHEN 1000 MG: 500 TABLET, FILM COATED ORAL at 05:08

## 2022-09-24 RX ADMIN — PANTOPRAZOLE SODIUM 40 MG: 40 TABLET, DELAYED RELEASE ORAL at 05:09

## 2022-09-24 RX ADMIN — POLYETHYLENE GLYCOL 3350 17 G: 17 POWDER, FOR SOLUTION ORAL at 09:53

## 2022-09-24 RX ADMIN — OXYCODONE 5 MG: 5 TABLET ORAL at 17:02

## 2022-09-24 RX ADMIN — SODIUM CHLORIDE, PRESERVATIVE FREE 10 ML: 5 INJECTION INTRAVENOUS at 09:55

## 2022-09-24 RX ADMIN — GABAPENTIN 100 MG: 100 CAPSULE ORAL at 13:00

## 2022-09-24 RX ADMIN — SODIUM CHLORIDE, PRESERVATIVE FREE 10 ML: 5 INJECTION INTRAVENOUS at 21:50

## 2022-09-24 RX ADMIN — OXYCODONE 10 MG: 5 TABLET ORAL at 21:50

## 2022-09-24 RX ADMIN — ACETAMINOPHEN 1000 MG: 500 TABLET, FILM COATED ORAL at 21:49

## 2022-09-24 RX ADMIN — GABAPENTIN 100 MG: 100 CAPSULE ORAL at 21:49

## 2022-09-24 RX ADMIN — ENOXAPARIN SODIUM 40 MG: 100 INJECTION SUBCUTANEOUS at 16:58

## 2022-09-24 RX ADMIN — ACETAMINOPHEN 1000 MG: 500 TABLET, FILM COATED ORAL at 13:00

## 2022-09-24 RX ADMIN — DOCUSATE SODIUM 100 MG: 100 CAPSULE, LIQUID FILLED ORAL at 09:53

## 2022-09-24 RX ADMIN — POLYETHYLENE GLYCOL 3350 17 G: 17 POWDER, FOR SOLUTION ORAL at 21:50

## 2022-09-24 RX ADMIN — MAGNESIUM HYDROXIDE 30 ML: 2400 SUSPENSION ORAL at 16:58

## 2022-09-24 ASSESSMENT — PAIN DESCRIPTION - LOCATION
LOCATION: ABDOMEN

## 2022-09-24 ASSESSMENT — PAIN DESCRIPTION - DESCRIPTORS
DESCRIPTORS: PRESSURE
DESCRIPTORS: ACHING;CRAMPING
DESCRIPTORS: ACHING
DESCRIPTORS: PRESSURE

## 2022-09-24 ASSESSMENT — PAIN DESCRIPTION - PAIN TYPE
TYPE: ACUTE PAIN
TYPE: ACUTE PAIN

## 2022-09-24 ASSESSMENT — PAIN SCALES - GENERAL
PAINLEVEL_OUTOF10: 0
PAINLEVEL_OUTOF10: 8
PAINLEVEL_OUTOF10: 0
PAINLEVEL_OUTOF10: 0
PAINLEVEL_OUTOF10: 7
PAINLEVEL_OUTOF10: 9
PAINLEVEL_OUTOF10: 0
PAINLEVEL_OUTOF10: 5
PAINLEVEL_OUTOF10: 7
PAINLEVEL_OUTOF10: 0
PAINLEVEL_OUTOF10: 7

## 2022-09-24 ASSESSMENT — PAIN DESCRIPTION - ORIENTATION
ORIENTATION: LOWER
ORIENTATION: MID
ORIENTATION: MID;LOWER
ORIENTATION: MID
ORIENTATION: MID

## 2022-09-24 ASSESSMENT — PAIN - FUNCTIONAL ASSESSMENT: PAIN_FUNCTIONAL_ASSESSMENT: ACTIVITIES ARE NOT PREVENTED

## 2022-09-24 NOTE — PROGRESS NOTES
Patient in bed with some abdominal pain with pain level at 6 and will provide pain management per orders. Dressings to abdomen are intact, clean with no drainage noted. RA with RR even/unlabored.   Call light in reach and family at bedside

## 2022-09-24 NOTE — PROGRESS NOTES
Roxicodone 10 mg po given for abdominal pain with pain level of 7 and will assess med with call light in reach

## 2022-09-24 NOTE — PROGRESS NOTES
MD rounding at bedside and aware of lower BP, 90/63. States to encourage PO fluids at this time. Patient is without complaints and asymptomatic.

## 2022-09-24 NOTE — PROGRESS NOTES
Shift exam complete. A/O x4. Ambulates to BR independently. Continues to c/o constipation. Abdomen is distended and tender. 3 incision sites with dressing C,D,I in place. Abdomen with small areas of dark bruising near incision sites. Denies pain, N/V; using ice pack to abdomen. Safety measures include SR X2, bed in l/l position, damian light in reach and door open.

## 2022-09-24 NOTE — PROGRESS NOTES
Hospitalist Progress Note   Admit Date:  2022  6:39 PM   Name:  Margarita Kim   Age:  81 y.o. Sex:  female  :  1940   MRN:  [id number]   Room:  [facility]/    Presenting Complaint: No chief complaint on file. Reason(s) for Admission: Intractable pain [R52]     Hospital Course:   Margarita Kim is a 81 y.o. female with medical history of chronic constipation, remote peptic ulcer duodenal  and significant endometriosis. She has had a cervical laminectomy tonsillectomy and tubal ligation in the past but relatively benign medical history for her age. Who presented with 1-1/2-year history of postprandial abdominal discomfort-recently over the past several weeks increased abdominal discomfort and particularly right lower quadrant pain which progressed to the point where she sought care in an emergency department today at [address] [place] [address] emergency facility. Initially felt to be urinary source but urinalysis negative and CT abdomen and pelvis revealed sludge versus other and a very thin-walled hydropic gallbladder which prompted ultrasound which showed that the gallbladder is nearly completely filled with stones. Patient had intractable pain and would not be discharged home as recommended because of intractable pain requiring IV narcotics and [facility] requested admission here for intractable pain. Surgery was notified and they recommended outpatient follow-up in the office. N.p.o. status IV hydration. CT imaging which did/does not show her gallstones that are well seen and fill her GB on US. Her GB is enlarged with a thin wall. Patient had laparoscopic cholecystectomy with ICG cholangiography by surgery on . Subjective & 24hr Events (22): Pt is seen and examined at the bedside. Pt states her abdominal pain is improving. She was weaned off on oxygen and is saturating well on room air. She is ambulating well and had a bowel movement.   Patient denies nausea, vomiting, diarrhea. Assessment & Plan:      Cholelithiasis  S/p Laparoscopic cholecystectomy with ICG cholangiography POD 1   Pain management with Oxycodone moderate pain and IV narcotics for severe pain  Continue clear liquids with ensure  Abdominal binder  Surgical following and appreciate recommendations    Leukocytosis   Resolved    Hyponatremia  Resolved    Acute hypoxic respiratory failure  Resolved  Saturating well on room air  Continue incentive spirometer     Chronic constipation--MiraLAX twice daily stool softener-monitor for worsening with narcotics     History of duodenal ulcer disease remote. Continue  PPI daily. PT /OT recommended home health       Anticipated discharge needs: Anticipating hospital stay for 1 to 2 days          Diet:  ADULT ORAL NUTRITION SUPPLEMENT; Breakfast, Lunch, Dinner; Clear Liquid Oral Supplement  ADULT DIET; Clear Liquid  DVT PPx: Lovenox  Code status: Full Code    Hospital Problems:  Principal Problem:    Intractable pain  Active Problems:    Calculus of gallbladder with acute on chronic cholecystitis without obstruction  Resolved Problems:    * No resolved hospital problems. *      Objective:   Patient Vitals for the past 24 hrs:   Temp Pulse Resp BP SpO2   09/24/22 1457 97.3 °F (36.3 °C) 64 17 (!) 109/55 100 %   09/24/22 1051 98.1 °F (36.7 °C) 62 16 90/63 99 %   09/24/22 0727 97.7 °F (36.5 °C) 61 16 (!) 120/54 96 %   09/24/22 0327 97.7 °F (36.5 °C) 63 16 (!) 105/47 96 %   09/23/22 2258 98 °F (36.7 °C) 61 17 (!) 91/46 95 %   09/23/22 1943 97.8 °F (36.6 °C) 66 18 (!) 134/48 97 %   09/23/22 1905 -- -- 16 -- --   09/23/22 1659 -- -- 16 -- --       Oxygen Therapy  SpO2: 100 %  Pulse Oximeter Device Mode: Intermittent  Pulse Oximeter Device Location: Finger  O2 Device: None (Room air)  O2 Flow Rate (L/min): 2 L/min    Estimated body mass index is 20.67 kg/m² as calculated from the following:    Height as of this encounter: 5' 7\" (1.702 m).     Weight as of this encounter: 132 lb (59.9 kg). Intake/Output Summary (Last 24 hours) at 9/24/2022 1617  Last data filed at 9/24/2022 1450  Gross per 24 hour   Intake 200 ml   Output 1450 ml   Net -1250 ml           Physical Exam:     Blood pressure (!) 109/55, pulse 64, temperature 97.3 °F (36.3 °C), temperature source Axillary, resp. rate 17, height 5' 7\" (1.702 m), weight 132 lb (59.9 kg), SpO2 100 %. General:    Well nourished. Head:  Normocephalic, atraumatic  Eyes:  Sclerae appear normal.  Pupils equally round. ENT:  Nares appear normal, no drainage. Moist oral mucosa  Neck:  No restricted ROM. Trachea midline   CV:   RRR. No m/r/g. No jugular venous distension. Lungs:   CTAB. No wheezing, rhonchi, or rales. Symmetric expansion. Abdomen: Bowel sounds present. Soft, Abdominal tenderness is present,  Dressing is present, no guarding or rigidity. extremities: No cyanosis or clubbing. No edema  Skin:     No rashes and normal coloration. Warm and dry. Neuro:  CN II-XII grossly intact. Sensation intact. A&Ox3  Psych:  Normal mood and affect.       I have personally reviewed labs and tests showing:  Recent Labs:  Recent Results (from the past 48 hour(s))   Basic Metabolic Panel w/ Reflex to MG    Collection Time: 09/23/22  3:59 AM   Result Value Ref Range    Sodium 132 (L) 136 - 145 mmol/L    Potassium 3.9 3.5 - 5.1 mmol/L    Chloride 100 (L) 101 - 110 mmol/L    CO2 27 21 - 32 mmol/L    Anion Gap 5 4 - 13 mmol/L    Glucose 122 (H) 65 - 100 mg/dL    BUN 11 8 - 23 MG/DL    Creatinine 0.70 0.6 - 1.0 MG/DL    GFR African American >60 >60 ml/min/1.73m2    GFR Non- >60 >60 ml/min/1.73m2    Calcium 8.3 8.3 - 10.4 MG/DL   CBC with Auto Differential    Collection Time: 09/23/22  3:59 AM   Result Value Ref Range    WBC 8.1 4.3 - 11.1 K/uL    RBC 3.34 (L) 4.05 - 5.2 M/uL    Hemoglobin 10.4 (L) 11.7 - 15.4 g/dL    Hematocrit 32.7 (L) 35.8 - 46.3 %    MCV 97.9 (H) 79.6 - 97.8 FL    MCH 31.1 26.1 - 32.9 PG    MCHC 31.8 31.4 - 35.0 g/dL    RDW 13.3 11.9 - 14.6 %    Platelets 730 618 - 504 K/uL    MPV 9.8 9.4 - 12.3 FL    nRBC 0.00 0.0 - 0.2 K/uL    Differential Type AUTOMATED      Seg Neutrophils 70 43 - 78 %    Lymphocytes 14 13 - 44 %    Monocytes 13 (H) 4.0 - 12.0 %    Eosinophils % 2 0.5 - 7.8 %    Basophils 0 0.0 - 2.0 %    Immature Granulocytes 1 0.0 - 5.0 %    Segs Absolute 5.7 1.7 - 8.2 K/UL    Absolute Lymph # 1.1 0.5 - 4.6 K/UL    Absolute Mono # 1.0 0.1 - 1.3 K/UL    Absolute Eos # 0.2 0.0 - 0.8 K/UL    Basophils Absolute 0.0 0.0 - 0.2 K/UL    Absolute Immature Granulocyte 0.0 0.0 - 0.5 K/UL   Basic Metabolic Panel w/ Reflex to MG    Collection Time: 09/24/22  3:54 AM   Result Value Ref Range    Sodium 136 136 - 145 mmol/L    Potassium 3.8 3.5 - 5.1 mmol/L    Chloride 103 101 - 110 mmol/L    CO2 28 21 - 32 mmol/L    Anion Gap 5 4 - 13 mmol/L    Glucose 105 (H) 65 - 100 mg/dL    BUN 8 8 - 23 MG/DL    Creatinine 0.60 0.6 - 1.0 MG/DL    GFR African American >60 >60 ml/min/1.73m2    GFR Non- >60 >60 ml/min/1.73m2    Calcium 8.5 8.3 - 10.4 MG/DL   CBC with Auto Differential    Collection Time: 09/24/22  3:54 AM   Result Value Ref Range    WBC 6.4 4.3 - 11.1 K/uL    RBC 3.20 (L) 4.05 - 5.2 M/uL    Hemoglobin 10.2 (L) 11.7 - 15.4 g/dL    Hematocrit 30.6 (L) 35.8 - 46.3 %    MCV 95.6 79.6 - 97.8 FL    MCH 31.9 26.1 - 32.9 PG    MCHC 33.3 31.4 - 35.0 g/dL    RDW 13.0 11.9 - 14.6 %    Platelets 764 834 - 809 K/uL    MPV 9.9 9.4 - 12.3 FL    nRBC 0.00 0.0 - 0.2 K/uL    Differential Type AUTOMATED      Seg Neutrophils 67 43 - 78 %    Lymphocytes 14 13 - 44 %    Monocytes 15 (H) 4.0 - 12.0 %    Eosinophils % 3 0.5 - 7.8 %    Basophils 1 0.0 - 2.0 %    Immature Granulocytes 0 0.0 - 5.0 %    Segs Absolute 4.4 1.7 - 8.2 K/UL    Absolute Lymph # 0.9 0.5 - 4.6 K/UL    Absolute Mono # 0.9 0.1 - 1.3 K/UL    Absolute Eos # 0.2 0.0 - 0.8 K/UL    Basophils Absolute 0.0 0.0 - 0.2 K/UL    Absolute Immature Granulocyte 0.0 0.0 - 0.5 K/UL       I have personally reviewed imaging studies showing: Other Studies:  XR CHEST PORTABLE   Final Result   No acute abnormality.           Current Meds:  Current Facility-Administered Medications   Medication Dose Route Frequency    enoxaparin (LOVENOX) injection 40 mg  40 mg SubCUTAneous Daily    benzocaine-menthol (CEPACOL SORE THROAT) lozenge 1 lozenge  1 lozenge Oral Q2H PRN    phenol 1.4 % mouth spray 1 spray  1 spray Mouth/Throat Q2H PRN    acetaminophen (TYLENOL) tablet 1,000 mg  1,000 mg Oral 3 times per day    oxyCODONE (ROXICODONE) immediate release tablet 5 mg  5 mg Oral Q4H PRN    oxyCODONE (ROXICODONE) immediate release tablet 10 mg  10 mg Oral Q4H PRN    gabapentin (NEURONTIN) capsule 100 mg  100 mg Oral TID    medicated lip ointment (BLISTEX)   Topical PRN    sodium chloride flush 0.9 % injection 5-40 mL  5-40 mL IntraVENous 2 times per day    sodium chloride flush 0.9 % injection 5-40 mL  5-40 mL IntraVENous PRN    0.9 % sodium chloride infusion   IntraVENous PRN    potassium chloride (KLOR-CON M) extended release tablet 40 mEq  40 mEq Oral PRN    Or    potassium bicarb-citric acid (EFFER-K) effervescent tablet 40 mEq  40 mEq Oral PRN    Or    potassium chloride 10 mEq/100 mL IVPB (Peripheral Line)  10 mEq IntraVENous PRN    potassium chloride 10 mEq/100 mL IVPB (Peripheral Line)  10 mEq IntraVENous PRN    magnesium sulfate 2000 mg in 50 mL IVPB premix  2,000 mg IntraVENous PRN    ondansetron (ZOFRAN-ODT) disintegrating tablet 4 mg  4 mg Oral Q8H PRN    Or    ondansetron (ZOFRAN) injection 4 mg  4 mg IntraVENous Q6H PRN    polyethylene glycol (GLYCOLAX) packet 17 g  17 g Oral BID    famotidine (PEPCID) tablet 10 mg  10 mg Oral Daily PRN    aluminum & magnesium hydroxide-simethicone (MAALOX) 200-200-20 MG/5ML suspension 30 mL  30 mL Oral Q6H PRN    docusate sodium (COLACE) capsule 100 mg  100 mg Oral Daily    pantoprazole (PROTONIX) tablet 40 mg  40 mg Oral QAM AC    morphine injection 2 mg  2 mg IntraVENous Q3H PRN       Signed:  Saba Mart MD    Part of this note may have been written by using a voice dictation software. The note has been proof read but may still contain some grammatical/other typographical errors.

## 2022-09-24 NOTE — PROGRESS NOTES
[address]  [phone]    Office Note/Consult Note   Mohsen West   MRN: [id number]     : 1940        HPI: Mohsen West is a 81 y.o. female who is seen at request of Dr. Kris Mac for intractable pain from cholelithiasis. She has had years of symptoms that could have been biliary colic vs. IBS vs. Chronic constipation. She has h/o endometriosis. She has had CT imaging which did/does not show her gallstones that are well seen and fill her GB on US. Her GB is enlarged with a thin wall. She has several medical issues, but no prior abdominal surgeries. She is currently without pain, but would like to proceed with cholecystectomy. I have personally reviewed images including previous CT which did not describe the enlarged GB present. It is only slightly smaller than current scan.    22 CHOLECYSTECTOMY LAPAROSCOPIC WITH ICG CHOLANGIOGRAPHY    21 POD1: \"thirsty\" with abdominal pain as expected. VSS/AF. Abd: soft, midline and trochar site dressings CDI. Tenderness with exam.   WBC 15, Hgb 11.2, Cr 0.8    21 POD3: Pt sitting up in bed. C/o abdominal pain. Tried Full liquids yesterday but felt bloated. Concerned about not having a BM. States relies on mag citrate at home. Reports +flatus/-BM. AF, NAD. WBC 6.4, Hgb 10.2.     Past Medical History:   Diagnosis Date    Constipation     Duodenal ulcer     Endometriosis      Past Surgical History:   Procedure Laterality Date    CERVICAL LAMINECTOMY      CHOLECYSTECTOMY, LAPAROSCOPIC N/A 2022    CHOLECYSTECTOMY LAPAROSCOPIC performed by Marita Hill MD at [facility] MAIN OR    TONSILLECTOMY      TUBAL LIGATION       Current Facility-Administered Medications   Medication Dose Route Frequency    enoxaparin (LOVENOX) injection 40 mg  40 mg SubCUTAneous Daily    benzocaine-menthol (CEPACOL SORE THROAT) lozenge 1 lozenge  1 lozenge Oral Q2H PRN    phenol 1.4 % mouth spray 1 spray  1 spray Mouth/Throat Q2H PRN    acetaminophen (TYLENOL) tablet 1,000 mg  1,000 mg Oral 3 times per day    oxyCODONE (ROXICODONE) immediate release tablet 5 mg  5 mg Oral Q4H PRN    oxyCODONE (ROXICODONE) immediate release tablet 10 mg  10 mg Oral Q4H PRN    gabapentin (NEURONTIN) capsule 100 mg  100 mg Oral TID    medicated lip ointment (BLISTEX)   Topical PRN    sodium chloride flush 0.9 % injection 5-40 mL  5-40 mL IntraVENous 2 times per day    sodium chloride flush 0.9 % injection 5-40 mL  5-40 mL IntraVENous PRN    0.9 % sodium chloride infusion   IntraVENous PRN    potassium chloride (KLOR-CON M) extended release tablet 40 mEq  40 mEq Oral PRN    Or    potassium bicarb-citric acid (EFFER-K) effervescent tablet 40 mEq  40 mEq Oral PRN    Or    potassium chloride 10 mEq/100 mL IVPB (Peripheral Line)  10 mEq IntraVENous PRN    potassium chloride 10 mEq/100 mL IVPB (Peripheral Line)  10 mEq IntraVENous PRN    magnesium sulfate 2000 mg in 50 mL IVPB premix  2,000 mg IntraVENous PRN    ondansetron (ZOFRAN-ODT) disintegrating tablet 4 mg  4 mg Oral Q8H PRN    Or    ondansetron (ZOFRAN) injection 4 mg  4 mg IntraVENous Q6H PRN    polyethylene glycol (GLYCOLAX) packet 17 g  17 g Oral BID    famotidine (PEPCID) tablet 10 mg  10 mg Oral Daily PRN    aluminum & magnesium hydroxide-simethicone (MAALOX) 200-200-20 MG/5ML suspension 30 mL  30 mL Oral Q6H PRN    docusate sodium (COLACE) capsule 100 mg  100 mg Oral Daily    pantoprazole (PROTONIX) tablet 40 mg  40 mg Oral QAM AC    morphine injection 2 mg  2 mg IntraVENous Q3H PRN     ALLERGIES:  Sulfamethoxazole-trimethoprim and Oxycodone-acetaminophen    Social History     Socioeconomic History    Marital status:      Spouse name: None    Number of children: None    Years of education: None    Highest education level: None   Tobacco Use    Smoking status: Never    Smokeless tobacco: Never   Substance and Sexual Activity    Alcohol use: No    Drug use: No     Social History     Tobacco Use   Smoking Status Never   Smokeless Tobacco Never     Family History   Problem Relation Age of Onset    Diabetes Brother     Diabetes Father     Heart Disease Mother     Heart Disease Brother        ROS: The patient has no difficulty with chest pain or shortness of breath. No fever or chills. The patient denies any personal or family history of abnormal clotting or bleeding. Comprehensive review of systems was otherwise unremarkable except as noted above. Physical Exam:   Constitutional: Alert oriented cooperative patient in no acute distress. BP (!) 109/55   Pulse 64   Temp 97.3 °F (36.3 °C) (Axillary)   Resp 17   Ht 5' 7\" (1.702 m)   Wt 132 lb (59.9 kg)   SpO2 100%   BMI 20.67 kg/m²   Eyes: Sclera are clear without icterus. ENMT: no obvious neck masses, no ear or lip lesions  CV: RRR. Normal perfusion  Resp: No JVD. Breathing is  non-labored. GI: thin, soft and distended with midline and trochar dressing sites CDI. Musculoskeletal: unremarkable with normal function.    Neuro:  No obvious focal deficits  Psychiatric: normal affect and mood, no memory impairment    Lab Results   Component Value Date/Time    WBC 6.4 09/24/2022 03:54 AM    HGB 10.2 09/24/2022 03:54 AM    HCT 30.6 09/24/2022 03:54 AM     09/24/2022 03:54 AM    MCV 95.6 09/24/2022 03:54 AM       Lab Results   Component Value Date     09/24/2022    K 3.8 09/24/2022     09/24/2022    CO2 28 09/24/2022    BUN 8 09/24/2022    CREATININE 0.60 09/24/2022    GLUCOSE 105 (H) 09/24/2022    CALCIUM 8.5 09/24/2022    PROT 6.9 09/20/2022    LABALBU 4.6 09/20/2022    BILITOT 0.5 09/20/2022    ALKPHOS 105 09/20/2022    AST 28 09/20/2022    ALT 28 09/20/2022    LABGLOM >60 09/24/2022    GFRAA >60 09/24/2022    AGRATIO 0.9 (L) 03/17/2022    GLOB 2.3 09/20/2022         CT Result (most recent):  CT ABDOMEN PELVIS W IV CONTRAST 09/20/2022    Narrative  CT OF THE ABDOMEN AND PELVIS WITH CONTRAST. CLINICAL INDICATION: Right lower quadrant abdominal pain    PROCEDURE: Serial thin section axial images obtained from the lung bases through  the proximal femurs following the administration of 100 cc of Isovue 370  intravenous contrast.  Radiation dose reduction techniques were used for this  study. Our CT scanners use one or all of the following: Automated exposure  control, adjusted of the mA and/or kV according to patient size, iterative  reconstruction    COMPARISON: CT abdomen and pelvis dated 8/21/2020    FINDINGS:    CT ABDOMEN: The gallbladder is very distended with a small amount of either  dependent sludge or small gallstones. The gallbladder wall is thin. No  pericholecystic inflammatory fat stranding. The appendix is normal. The bowel is  normal in course, caliber, and wall thickness. The stomach is decompressed. The  pancreas is normal. The liver and spleen are unremarkable. The kidneys are  symmetric in size and contrast enhancement. There is no hydronephrosis. The  adrenal glands are normal. There is no ascites or adenopathy. CT PELVIS: The bladder is very distended with smooth thin walls. The uterus and  adnexa are unremarkable. The rectum is normal. No free fluid accumulating  filling the pelvis. There is no inguinal hernia. No aggressive osseous lesions identified. Impression  1. Hydropic gallbladder with thin walls. Dependent sludge or small stones noted  in the gallbladder fundus. If there is clinical concern for cholecystitis,  consider further evaluation with right upper quadrant abdominal ultrasound. 2. Otherwise no acute abdominal or pelvic abnormality. The appendix is normal.      US Result (most recent):  US ABDOMEN LIMITED 09/20/2022    Narrative  Right upper quadrant abdominal ultrasound.     CLINICAL INDICATION: Cholelithiasis    PROCEDURE: Realtime grayscale and color Doppler evaluation of the right upper  abdominal quadrant. COMPARISON: CT of the abdomen and pelvis dated 9/20/2022    FINDINGS: The liver is normal in size and echogenicity without focal lesion the  gallbladder is distended and near completely full of gallstones. The gallbladder  wall is thin at 2 mm. A negative sonographic Bretta Ruts sign is reported. The common  bile duct measures 3.7 mm. The imaged portion the pancreas is unremarkable. The  right kidney is normal in size measuring 10.8 cm. There is no hydronephrosis. Echogenicity is normal. The aorta and IVC are patent and unremarkable. Impression  Distended gallbladder with cholelithiasis. The gallbladder is near  completely filled with stones. Assessment/Plan:     Pepito Childress is a 81 y.o. female who presents with recurrent abdominal pain and cholelithiasis with very enlarged GB. Hopefully symptoms are related to GB and stones as pathology. Unlikely that GB is functioning and will proceed with cholecystectomy. She has no jaundice. Strong possibility that procedure will convert to open with size limitation of GB. We discussed proceeding with laparoscopic, possible open cholecystectomy. We will give ICG for immunofluorescent cholangiography. I discussed the patient's condition and treatment options with the patient. I discussed risks of surgery in language the patient could understand including bleeding, infection, need for further surgery, abscess, fistula, SBO, DVT, PE, heart attack, stroke, renal failure, respiratory failure, ventilatory dependence, and death. The patient voiced understanding of all this and all questions were answered. Alternatives to surgery were discussed also and risks of the alternatives. The patient requested that we proceed with surgery. Informed consent was obtained.        Cholelithiasis with chronic cholecystitis  S/P 9/21/22 CHOLECYSTECTOMY LAPAROSCOPIC WITH ICG CHOLANGIOGRAPHY  >doing well  >CLD diet with IVFs   >monitor for pain and treat   >Dressings off in 4 days  >IS  >MOM        Patient Active Problem List    Diagnosis Date Noted    Calculus of gallbladder with acute on chronic cholecystitis without obstruction 09/21/2022     Priority: Medium    Intractable pain 09/20/2022     Priority: Medium    Syncope 03/17/2022    SOB (shortness of breath) 03/17/2022    Acute gastric ulcer without hemorrhage or perforation 04/16/2021    Stage 3a chronic kidney disease ([address] 04/16/2021    Pain of right sacroiliac joint 01/05/2021    Right hip pain 01/05/2021    Grief 01/05/2021    Irritable bowel syndrome with both constipation and diarrhea 01/05/2021    Right lower quadrant abdominal pain 08/17/2020    Endometriosis 08/17/2020    Vitamin D deficiency 07/23/2020    Caregiver with fatigue 07/23/2020    Dental caries 07/23/2020    Flu-like symptoms 02/10/2020    Acute non-recurrent maxillary sinusitis 02/10/2020    Non-recurrent acute serous otitis media of both ears 02/10/2020    Osteopenia of neck of left femur 08/23/2019     T -2.3 August 2019  Repeat 2021        Paronychia of finger of right hand 08/30/2018    Sciatica of right side 04/03/2018    Raynaud's disease without gangrene 04/03/2018    Chronic neck pain 04/03/2018    Encounter for monitoring chronic NSAID therapy 04/03/2018    Chronic fatigue 04/03/2018    Exposure to varicella zoster virus (VZV) 04/03/2018          Elyse Blount NP    [facility]  Agree with above evaluation and plan

## 2022-09-24 NOTE — PROGRESS NOTES
Patient resting in bed with no distress noted.   Call light in reach and will prepare bedside shift report for oncoming nurse

## 2022-09-25 PROBLEM — K80.20 CHOLELITHIASIS WITHOUT CHOLECYSTITIS: Status: ACTIVE | Noted: 2022-09-25

## 2022-09-25 LAB
ANION GAP SERPL CALC-SCNC: 3 MMOL/L (ref 4–13)
BASOPHILS # BLD: 0 K/UL (ref 0–0.2)
BASOPHILS NFR BLD: 1 % (ref 0–2)
BUN SERPL-MCNC: 7 MG/DL (ref 8–23)
CALCIUM SERPL-MCNC: 8.2 MG/DL (ref 8.3–10.4)
CHLORIDE SERPL-SCNC: 108 MMOL/L (ref 101–110)
CO2 SERPL-SCNC: 28 MMOL/L (ref 21–32)
CREAT SERPL-MCNC: 0.5 MG/DL (ref 0.6–1)
DIFFERENTIAL METHOD BLD: ABNORMAL
EOSINOPHIL # BLD: 0.2 K/UL (ref 0–0.8)
EOSINOPHIL NFR BLD: 5 % (ref 0.5–7.8)
ERYTHROCYTE [DISTWIDTH] IN BLOOD BY AUTOMATED COUNT: 13.2 % (ref 11.9–14.6)
GLUCOSE SERPL-MCNC: 95 MG/DL (ref 65–100)
HCT VFR BLD AUTO: 30.5 % (ref 35.8–46.3)
HGB BLD-MCNC: 9.8 G/DL (ref 11.7–15.4)
IMM GRANULOCYTES # BLD AUTO: 0 K/UL (ref 0–0.5)
IMM GRANULOCYTES NFR BLD AUTO: 0 % (ref 0–5)
LYMPHOCYTES # BLD: 1.1 K/UL (ref 0.5–4.6)
LYMPHOCYTES NFR BLD: 25 % (ref 13–44)
MCH RBC QN AUTO: 30.9 PG (ref 26.1–32.9)
MCHC RBC AUTO-ENTMCNC: 32.1 G/DL (ref 31.4–35)
MCV RBC AUTO: 96.2 FL (ref 79.6–97.8)
MONOCYTES # BLD: 0.8 K/UL (ref 0.1–1.3)
MONOCYTES NFR BLD: 17 % (ref 4–12)
NEUTS SEG # BLD: 2.4 K/UL (ref 1.7–8.2)
NEUTS SEG NFR BLD: 52 % (ref 43–78)
NRBC # BLD: 0 K/UL (ref 0–0.2)
PHOSPHATE SERPL-MCNC: 2.6 MG/DL (ref 2.3–3.7)
PLATELET # BLD AUTO: 215 K/UL (ref 150–450)
PMV BLD AUTO: 9.9 FL (ref 9.4–12.3)
POTASSIUM SERPL-SCNC: 3.6 MMOL/L (ref 3.5–5.1)
RBC # BLD AUTO: 3.17 M/UL (ref 4.05–5.2)
SODIUM SERPL-SCNC: 139 MMOL/L (ref 136–145)
WBC # BLD AUTO: 4.5 K/UL (ref 4.3–11.1)

## 2022-09-25 PROCEDURE — 2580000003 HC RX 258: Performed by: INTERNAL MEDICINE

## 2022-09-25 PROCEDURE — 80048 BASIC METABOLIC PNL TOTAL CA: CPT

## 2022-09-25 PROCEDURE — 2060000000 HC ICU INTERMEDIATE R&B

## 2022-09-25 PROCEDURE — 6370000000 HC RX 637 (ALT 250 FOR IP): Performed by: NURSE PRACTITIONER

## 2022-09-25 PROCEDURE — 6370000000 HC RX 637 (ALT 250 FOR IP): Performed by: INTERNAL MEDICINE

## 2022-09-25 PROCEDURE — 85025 COMPLETE CBC W/AUTO DIFF WBC: CPT

## 2022-09-25 PROCEDURE — 36415 COLL VENOUS BLD VENIPUNCTURE: CPT

## 2022-09-25 PROCEDURE — 84100 ASSAY OF PHOSPHORUS: CPT

## 2022-09-25 PROCEDURE — 6360000002 HC RX W HCPCS: Performed by: STUDENT IN AN ORGANIZED HEALTH CARE EDUCATION/TRAINING PROGRAM

## 2022-09-25 RX ORDER — CALCIUM CARBONATE 200(500)MG
500 TABLET,CHEWABLE ORAL 3 TIMES DAILY PRN
Status: DISCONTINUED | OUTPATIENT
Start: 2022-09-25 | End: 2022-09-26 | Stop reason: HOSPADM

## 2022-09-25 RX ADMIN — DOCUSATE SODIUM 100 MG: 100 CAPSULE, LIQUID FILLED ORAL at 08:54

## 2022-09-25 RX ADMIN — POLYETHYLENE GLYCOL 3350 17 G: 17 POWDER, FOR SOLUTION ORAL at 21:22

## 2022-09-25 RX ADMIN — GABAPENTIN 100 MG: 100 CAPSULE ORAL at 08:54

## 2022-09-25 RX ADMIN — ACETAMINOPHEN 1000 MG: 500 TABLET, FILM COATED ORAL at 06:24

## 2022-09-25 RX ADMIN — OXYCODONE 10 MG: 5 TABLET ORAL at 06:25

## 2022-09-25 RX ADMIN — GABAPENTIN 100 MG: 100 CAPSULE ORAL at 14:26

## 2022-09-25 RX ADMIN — ENOXAPARIN SODIUM 40 MG: 100 INJECTION SUBCUTANEOUS at 14:27

## 2022-09-25 RX ADMIN — POLYETHYLENE GLYCOL 3350 17 G: 17 POWDER, FOR SOLUTION ORAL at 08:54

## 2022-09-25 RX ADMIN — SODIUM CHLORIDE, PRESERVATIVE FREE 10 ML: 5 INJECTION INTRAVENOUS at 21:23

## 2022-09-25 RX ADMIN — SODIUM CHLORIDE, PRESERVATIVE FREE 10 ML: 5 INJECTION INTRAVENOUS at 08:54

## 2022-09-25 RX ADMIN — PANTOPRAZOLE SODIUM 40 MG: 40 TABLET, DELAYED RELEASE ORAL at 06:24

## 2022-09-25 RX ADMIN — ACETAMINOPHEN 1000 MG: 500 TABLET, FILM COATED ORAL at 14:26

## 2022-09-25 RX ADMIN — GABAPENTIN 100 MG: 100 CAPSULE ORAL at 21:22

## 2022-09-25 RX ADMIN — ACETAMINOPHEN 1000 MG: 500 TABLET, FILM COATED ORAL at 21:22

## 2022-09-25 ASSESSMENT — PAIN SCALES - GENERAL
PAINLEVEL_OUTOF10: 0
PAINLEVEL_OUTOF10: 10
PAINLEVEL_OUTOF10: 9
PAINLEVEL_OUTOF10: 0

## 2022-09-25 ASSESSMENT — PAIN DESCRIPTION - ORIENTATION
ORIENTATION: MID
ORIENTATION: MID

## 2022-09-25 ASSESSMENT — PAIN DESCRIPTION - DESCRIPTORS
DESCRIPTORS: ACHING;SHARP
DESCRIPTORS: ACHING

## 2022-09-25 ASSESSMENT — PAIN DESCRIPTION - LOCATION
LOCATION: ABDOMEN
LOCATION: ABDOMEN

## 2022-09-25 NOTE — PROGRESS NOTES
Patient in bed with some pain to abdomen with pain level at 5 and will provide pain management. RA with RR even/unlabored. Patient abdomen is distended with tenderness and 3 incision sites with dressing in clean, dry and intact.   Safety in place with call light in reach

## 2022-09-25 NOTE — PROGRESS NOTES
Hospitalist Progress Note   Admit Date:  2022  6:39 PM   Name:  Hakan Arvizu   Age:  81 y.o. Sex:  female  :  1940   MRN:  [id number]   Room:  [facility]/    Presenting Complaint: No chief complaint on file. Reason(s) for Admission: Intractable pain [R52]  Cholelithiasis without cholecystitis [K80.20]     Hospital Course:   Hakan Arvizu is a 81 y.o. female with medical history of chronic constipation, remote peptic ulcer duodenal  and significant endometriosis. She has had a cervical laminectomy tonsillectomy and tubal ligation in the past but relatively benign medical history for her age. Who presented with 1-1/2-year history of postprandial abdominal discomfort-recently over the past several weeks increased abdominal discomfort and particularly right lower quadrant pain which progressed to the point where she sought care in an emergency department today at [facility]. Initially felt to be urinary source but urinalysis negative and CT abdomen and pelvis revealed sludge versus other and a very thin-walled hydropic gallbladder which prompted ultrasound which showed that the gallbladder is nearly completely filled with stones. Patient had intractable pain and would not be discharged home as recommended because of intractable pain requiring IV narcotics and [place] [facility] requested admission here for intractable pain. Surgery was notified and they recommended outpatient follow-up in the office. N.p.o. status IV hydration. CT imaging which did/does not show her gallstones that are well seen and fill her GB on US. Her GB is enlarged with a thin wall. Patient had laparoscopic cholecystectomy with ICG cholangiography by surgery on . Subjective & 24hr Events (22): Pt is seen and examined at the bedside. Pt states her abdominal pain is improving. She is ambulating well and had a bowel movement.  She is complaining of abdominal discomfort and pressure like pain. She is complaining of lot of gas and medication is not helping. Patient denies nausea, vomiting, diarrhea. Assessment & Plan:      Cholelithiasis  S/p Laparoscopic cholecystectomy with ICG cholangiography   Pain management with Oxycodone moderate pain and IV narcotics for severe pain  Will advance the diet to FLD  S/P Abdominal dressing removed and left open. Surgical following and appreciate recommendations    Leukocytosis   Resolved    Hyponatremia  Resolved    Acute hypoxic respiratory failure  Resolved  Saturating well on room air  Continue incentive spirometer     Chronic constipation--MiraLAX twice daily stool softener-monitor for worsening with narcotics     History of duodenal ulcer disease remote. Continue  PPI daily. PT /OT recommended home health       Anticipated discharge needs: Anticipating hospital discharge in 24 hrs          Diet:  ADULT ORAL NUTRITION SUPPLEMENT; Breakfast, Lunch, Dinner; Clear Liquid Oral Supplement  ADULT DIET; Full Liquid  DVT PPx: Lovenox  Code status: Full Code    Hospital Problems:  Principal Problem:    Intractable pain  Active Problems:    Calculus of gallbladder with acute on chronic cholecystitis without obstruction    Cholelithiasis without cholecystitis  Resolved Problems:    * No resolved hospital problems.  *      Objective:   Patient Vitals for the past 24 hrs:   Temp Pulse Resp BP SpO2   09/25/22 1118 97.3 °F (36.3 °C) 66 18 (!) 124/51 100 %   09/25/22 0751 97.7 °F (36.5 °C) 56 19 (!) 111/50 98 %   09/25/22 0430 97.8 °F (36.6 °C) 59 18 (!) 125/55 --   09/24/22 2315 98.1 °F (36.7 °C) 59 20 (!) 112/55 100 %   09/24/22 1921 97.9 °F (36.6 °C) 65 16 (!) 116/41 100 %       Oxygen Therapy  SpO2: 100 %  Pulse Oximeter Device Mode: Intermittent  Pulse Oximeter Device Location: Finger  O2 Device: None (Room air)  O2 Flow Rate (L/min): 2 L/min    Estimated body mass index is 20.67 kg/m² as calculated from the following:    Height as of this encounter: 5' 7\" (1.702 m). Weight as of this encounter: 132 lb (59.9 kg). Intake/Output Summary (Last 24 hours) at 9/25/2022 1534  Last data filed at 9/24/2022 2233  Gross per 24 hour   Intake 100 ml   Output 650 ml   Net -550 ml           Physical Exam:     Blood pressure (!) 124/51, pulse 66, temperature 97.3 °F (36.3 °C), temperature source Oral, resp. rate 18, height 5' 7\" (1.702 m), weight 132 lb (59.9 kg), SpO2 100 %. General:    Well nourished. Head:  Normocephalic, atraumatic  Eyes:  Sclerae appear normal.  Pupils equally round. ENT:  Nares appear normal, no drainage. Moist oral mucosa  Neck:  No restricted ROM. Trachea midline   CV:   RRR. No m/r/g. No jugular venous distension. Lungs:   CTAB. No wheezing, rhonchi, or rales. Symmetric expansion. Abdomen: Bowel sounds present. Soft, Abdominal tenderness is present,  Dressing is present, no guarding or rigidity. extremities: No cyanosis or clubbing. No edema  Skin:     No rashes and normal coloration. Warm and dry. Neuro:  CN II-XII grossly intact. Sensation intact. A&Ox3  Psych:  Normal mood and affect.       I have personally reviewed labs and tests showing:  Recent Labs:  Recent Results (from the past 48 hour(s))   Basic Metabolic Panel w/ Reflex to MG    Collection Time: 09/24/22  3:54 AM   Result Value Ref Range    Sodium 136 136 - 145 mmol/L    Potassium 3.8 3.5 - 5.1 mmol/L    Chloride 103 101 - 110 mmol/L    CO2 28 21 - 32 mmol/L    Anion Gap 5 4 - 13 mmol/L    Glucose 105 (H) 65 - 100 mg/dL    BUN 8 8 - 23 MG/DL    Creatinine 0.60 0.6 - 1.0 MG/DL    GFR African American >60 >60 ml/min/1.73m2    GFR Non- >60 >60 ml/min/1.73m2    Calcium 8.5 8.3 - 10.4 MG/DL   CBC with Auto Differential    Collection Time: 09/24/22  3:54 AM   Result Value Ref Range    WBC 6.4 4.3 - 11.1 K/uL    RBC 3.20 (L) 4.05 - 5.2 M/uL    Hemoglobin 10.2 (L) 11.7 - 15.4 g/dL    Hematocrit 30.6 (L) 35.8 - 46.3 %    MCV 95.6 79.6 - 97.8 FL    MCH 31.9 26.1 - 32.9 PG    MCHC 33.3 31.4 - 35.0 g/dL    RDW 13.0 11.9 - 14.6 %    Platelets 986 496 - 798 K/uL    MPV 9.9 9.4 - 12.3 FL    nRBC 0.00 0.0 - 0.2 K/uL    Differential Type AUTOMATED      Seg Neutrophils 67 43 - 78 %    Lymphocytes 14 13 - 44 %    Monocytes 15 (H) 4.0 - 12.0 %    Eosinophils % 3 0.5 - 7.8 %    Basophils 1 0.0 - 2.0 %    Immature Granulocytes 0 0.0 - 5.0 %    Segs Absolute 4.4 1.7 - 8.2 K/UL    Absolute Lymph # 0.9 0.5 - 4.6 K/UL    Absolute Mono # 0.9 0.1 - 1.3 K/UL    Absolute Eos # 0.2 0.0 - 0.8 K/UL    Basophils Absolute 0.0 0.0 - 0.2 K/UL    Absolute Immature Granulocyte 0.0 0.0 - 0.5 K/UL   Basic Metabolic Panel w/ Reflex to MG    Collection Time: 09/25/22  4:06 AM   Result Value Ref Range    Sodium 139 136 - 145 mmol/L    Potassium 3.6 3.5 - 5.1 mmol/L    Chloride 108 101 - 110 mmol/L    CO2 28 21 - 32 mmol/L    Anion Gap 3 (L) 4 - 13 mmol/L    Glucose 95 65 - 100 mg/dL    BUN 7 (L) 8 - 23 MG/DL    Creatinine 0.50 (L) 0.6 - 1.0 MG/DL    GFR African American >60 >60 ml/min/1.73m2    GFR Non- >60 >60 ml/min/1.73m2    Calcium 8.2 (L) 8.3 - 10.4 MG/DL   CBC with Auto Differential    Collection Time: 09/25/22  4:06 AM   Result Value Ref Range    WBC 4.5 4.3 - 11.1 K/uL    RBC 3.17 (L) 4.05 - 5.2 M/uL    Hemoglobin 9.8 (L) 11.7 - 15.4 g/dL    Hematocrit 30.5 (L) 35.8 - 46.3 %    MCV 96.2 79.6 - 97.8 FL    MCH 30.9 26.1 - 32.9 PG    MCHC 32.1 31.4 - 35.0 g/dL    RDW 13.2 11.9 - 14.6 %    Platelets 592 245 - 550 K/uL    MPV 9.9 9.4 - 12.3 FL    nRBC 0.00 0.0 - 0.2 K/uL    Differential Type AUTOMATED      Seg Neutrophils 52 43 - 78 %    Lymphocytes 25 13 - 44 %    Monocytes 17 (H) 4.0 - 12.0 %    Eosinophils % 5 0.5 - 7.8 %    Basophils 1 0.0 - 2.0 %    Immature Granulocytes 0 0.0 - 5.0 %    Segs Absolute 2.4 1.7 - 8.2 K/UL    Absolute Lymph # 1.1 0.5 - 4.6 K/UL    Absolute Mono # 0.8 0.1 - 1.3 K/UL    Absolute Eos # 0.2 0.0 - 0.8 K/UL    Basophils Absolute 0.0 0.0 - 0.2 K/UL    Absolute Immature Granulocyte 0.0 0.0 - 0.5 K/UL   Phosphorus    Collection Time: 09/25/22  4:06 AM   Result Value Ref Range    Phosphorus 2.6 2.3 - 3.7 MG/DL       I have personally reviewed imaging studies showing: Other Studies:  XR CHEST PORTABLE   Final Result   No acute abnormality.           Current Meds:  Current Facility-Administered Medications   Medication Dose Route Frequency    enoxaparin (LOVENOX) injection 40 mg  40 mg SubCUTAneous Daily    benzocaine-menthol (CEPACOL SORE THROAT) lozenge 1 lozenge  1 lozenge Oral Q2H PRN    phenol 1.4 % mouth spray 1 spray  1 spray Mouth/Throat Q2H PRN    acetaminophen (TYLENOL) tablet 1,000 mg  1,000 mg Oral 3 times per day    oxyCODONE (ROXICODONE) immediate release tablet 5 mg  5 mg Oral Q4H PRN    oxyCODONE (ROXICODONE) immediate release tablet 10 mg  10 mg Oral Q4H PRN    gabapentin (NEURONTIN) capsule 100 mg  100 mg Oral TID    medicated lip ointment (BLISTEX)   Topical PRN    sodium chloride flush 0.9 % injection 5-40 mL  5-40 mL IntraVENous 2 times per day    sodium chloride flush 0.9 % injection 5-40 mL  5-40 mL IntraVENous PRN    0.9 % sodium chloride infusion   IntraVENous PRN    potassium chloride (KLOR-CON M) extended release tablet 40 mEq  40 mEq Oral PRN    Or    potassium bicarb-citric acid (EFFER-K) effervescent tablet 40 mEq  40 mEq Oral PRN    Or    potassium chloride 10 mEq/100 mL IVPB (Peripheral Line)  10 mEq IntraVENous PRN    potassium chloride 10 mEq/100 mL IVPB (Peripheral Line)  10 mEq IntraVENous PRN    magnesium sulfate 2000 mg in 50 mL IVPB premix  2,000 mg IntraVENous PRN    ondansetron (ZOFRAN-ODT) disintegrating tablet 4 mg  4 mg Oral Q8H PRN    Or    ondansetron (ZOFRAN) injection 4 mg  4 mg IntraVENous Q6H PRN    polyethylene glycol (GLYCOLAX) packet 17 g  17 g Oral BID    famotidine (PEPCID) tablet 10 mg  10 mg Oral Daily PRN    aluminum & magnesium hydroxide-simethicone (MAALOX) 200-200-20 MG/5ML suspension 30 mL  30 mL Oral Q6H PRN    docusate sodium (COLACE) capsule 100 mg  100 mg Oral Daily    pantoprazole (PROTONIX) tablet 40 mg  40 mg Oral QAM AC    morphine injection 2 mg  2 mg IntraVENous Q3H PRN       Signed:  Mouna Le MD    Part of this note may have been written by using a voice dictation software. The note has been proof read but may still contain some grammatical/other typographical errors.

## 2022-09-25 NOTE — PROGRESS NOTES
Patient is sitting in chair    No distress noted    She was reading her devotion       joined with her as she read scripture and reflected upon the message in her devotional    Spent time on life application for what she is reading      Demonstrates a strong Caodaism kirby    She opened up about her life and some of the recent challenges     asked her about being a \"caregiver\". She said her  and her brother have been sick and she has taken care of them. Also discuss some grief issues - especially about a young man they had taken in and he went down the wrong road of life. Patient was very thankful for visit -    She may go home soon. She was very thankful for the care she has received.

## 2022-09-25 NOTE — PROGRESS NOTES
Roxicodone 10 mg po given for abdominal pain with pain level at 10 and nursing will assess pain med. RA and RR even/unlabored.  Call light in reach and will prepare bedside shift report for oncoming nurse

## 2022-09-25 NOTE — PROGRESS NOTES
Roxicodone 10 mg po given for abdominal pain with pain level at 9 and will assess med with call light in reach

## 2022-09-25 NOTE — PROGRESS NOTES
[address], [address] [place] [address], [address] [place] [address]  [phone]    Office Note/Consult Note   Kenrick Fay   MRN: [id number]     : 1940        HPI: Kenrick Fay is a 81 y.o. female who is seen at request of Dr. Edu Turk for intractable pain from cholelithiasis. She has had years of symptoms that could have been biliary colic vs. IBS vs. Chronic constipation. She has h/o endometriosis. She has had CT imaging which did/does not show her gallstones that are well seen and fill her GB on US. Her GB is enlarged with a thin wall. She has several medical issues, but no prior abdominal surgeries. She is currently without pain, but would like to proceed with cholecystectomy. I have personally reviewed images including previous CT which did not describe the enlarged GB present. It is only slightly smaller than current scan.    22 CHOLECYSTECTOMY LAPAROSCOPIC WITH ICG CHOLANGIOGRAPHY    21 POD1: \"thirsty\" with abdominal pain as expected. VSS/AF. Abd: soft, midline and trochar site dressings CDI. Tenderness with exam.   WBC 15, Hgb 11.2, Cr 0.8    21 POD3: Pt sitting up in bed. C/o abdominal pain. Tried Full liquids yesterday but felt bloated. Concerned about not having a BM. States relies on mag citrate at home. Reports +flatus/-BM. AF, NAD. WBC 6.4, Hgb 10.2.    21 POD4: Pt standing  up in room stretching legs. Feeling better today. Has been tolerating Clear Liquids. No nausea or vomiting. Reports +flatus/+BM. AF, NAD. WBC 4.5, Hgb 9.8.     Past Medical History:   Diagnosis Date    Constipation     Duodenal ulcer     Endometriosis      Past Surgical History:   Procedure Laterality Date    CERVICAL LAMINECTOMY      CHOLECYSTECTOMY, LAPAROSCOPIC N/A 2022    CHOLECYSTECTOMY LAPAROSCOPIC performed by [facility]a [facility] at [facility] MA[facility] OR    TONSILLECTOMY      TUBAL LIGATION       Current Facility-Administered Medications   Medication Dose Route Frequency    enoxaparin (LOVENOX) injection 40 mg  40 mg SubCUTAneous Daily    benzocaine-menthol (CEPACOL SORE THROAT) lozenge 1 lozenge  1 lozenge Oral Q2H PRN    phenol 1.4 % mouth spray 1 spray  1 spray Mouth/Throat Q2H PRN    acetaminophen (TYLENOL) tablet 1,000 mg  1,000 mg Oral 3 times per day    oxyCODONE (ROXICODONE) immediate release tablet 5 mg  5 mg Oral Q4H PRN    oxyCODONE (ROXICODONE) immediate release tablet 10 mg  10 mg Oral Q4H PRN    gabapentin (NEURONTIN) capsule 100 mg  100 mg Oral TID    medicated lip ointment (BLISTEX)   Topical PRN    sodium chloride flush 0.9 % injection 5-40 mL  5-40 mL IntraVENous 2 times per day    sodium chloride flush 0.9 % injection 5-40 mL  5-40 mL IntraVENous PRN    0.9 % sodium chloride infusion   IntraVENous PRN    potassium chloride (KLOR-CON M) extended release tablet 40 mEq  40 mEq Oral PRN    Or    potassium bicarb-citric acid (EFFER-K) effervescent tablet 40 mEq  40 mEq Oral PRN    Or    potassium chloride 10 mEq/100 mL IVPB (Peripheral Line)  10 mEq IntraVENous PRN    potassium chloride 10 mEq/100 mL IVPB (Peripheral Line)  10 mEq IntraVENous PRN    magnesium sulfate 2000 mg in 50 mL IVPB premix  2,000 mg IntraVENous PRN    ondansetron (ZOFRAN-ODT) disintegrating tablet 4 mg  4 mg Oral Q8H PRN    Or    ondansetron (ZOFRAN) injection 4 mg  4 mg IntraVENous Q6H PRN    polyethylene glycol (GLYCOLAX) packet 17 g  17 g Oral BID    famotidine (PEPCID) tablet 10 mg  10 mg Oral Daily PRN    aluminum & magnesium hydroxide-simethicone (MAALOX) 200-200-20 MG/5ML suspension 30 mL  30 mL Oral Q6H PRN    docusate sodium (COLACE) capsule 100 mg  100 mg Oral Daily    pantoprazole (PROTONIX) tablet 40 mg  40 mg Oral QAM AC    morphine injection 2 mg  2 mg IntraVENous Q3H PRN     ALLERGIES:  Sulfamethoxazole-trimethoprim and Oxycodone-acetaminophen    Social History     Socioeconomic History    Marital status:      Spouse name: None Number of children: None    Years of education: None    Highest education level: None   Tobacco Use    Smoking status: Never    Smokeless tobacco: Never   Substance and Sexual Activity    Alcohol use: No    Drug use: No     Social History     Tobacco Use   Smoking Status Never   Smokeless Tobacco Never     Family History   Problem Relation Age of Onset    Diabetes Brother     Diabetes Father     Heart Disease Mother     Heart Disease Brother        ROS: The patient has no difficulty with chest pain or shortness of breath. No fever or chills. The patient denies any personal or family history of abnormal clotting or bleeding. Comprehensive review of systems was otherwise unremarkable except as noted above. Physical Exam:   Constitutional: Alert oriented cooperative patient in no acute distress. BP (!) 111/50   Pulse 56   Temp 97.7 °F (36.5 °C) (Oral)   Resp 19   Ht 5' 7\" (1.702 m)   Wt 132 lb (59.9 kg)   SpO2 98%   BMI 20.67 kg/m²   Eyes: Sclera are clear without icterus. ENMT: no obvious neck masses, no ear or lip lesions  CV: RRR. Normal perfusion  Resp: No JVD. Breathing is  non-labored. GI: thin, soft and non distended. Appropriately ttp with midline and trochar dressing sites CDI. Musculoskeletal: unremarkable with normal function.    Neuro:  No obvious focal deficits  Psychiatric: normal affect and mood, no memory impairment    Lab Results   Component Value Date/Time    WBC 4.5 09/25/2022 04:06 AM    HGB 9.8 09/25/2022 04:06 AM    HCT 30.5 09/25/2022 04:06 AM     09/25/2022 04:06 AM    MCV 96.2 09/25/2022 04:06 AM       Lab Results   Component Value Date     09/25/2022    K 3.6 09/25/2022     09/25/2022    CO2 28 09/25/2022    BUN 7 (L) 09/25/2022    CREATININE 0.50 (L) 09/25/2022    GLUCOSE 95 09/25/2022    CALCIUM 8.2 (L) 09/25/2022    PROT 6.9 09/20/2022    LABALBU 4.6 09/20/2022    BILITOT 0.5 09/20/2022    ALKPHOS 105 09/20/2022    AST 28 09/20/2022    ALT 28 09/20/2022    LABGLOM >60 09/25/2022    GFRAA >60 09/25/2022    AGRATIO 0.9 (L) 03/17/2022    GLOB 2.3 09/20/2022         CT Result (most recent):  CT ABDOMEN PELVIS W IV CONTRAST 09/20/2022    Narrative  CT OF THE ABDOMEN AND PELVIS WITH CONTRAST. CLINICAL INDICATION: Right lower quadrant abdominal pain    PROCEDURE: Serial thin section axial images obtained from the lung bases through  the proximal femurs following the administration of 100 cc of Isovue 370  intravenous contrast.  Radiation dose reduction techniques were used for this  study. Our CT scanners use one or all of the following: Automated exposure  control, adjusted of the mA and/or kV according to patient size, iterative  reconstruction    COMPARISON: CT abdomen and pelvis dated 8/21/2020    FINDINGS:    CT ABDOMEN: The gallbladder is very distended with a small amount of either  dependent sludge or small gallstones. The gallbladder wall is thin. No  pericholecystic inflammatory fat stranding. The appendix is normal. The bowel is  normal in course, caliber, and wall thickness. The stomach is decompressed. The  pancreas is normal. The liver and spleen are unremarkable. The kidneys are  symmetric in size and contrast enhancement. There is no hydronephrosis. The  adrenal glands are normal. There is no ascites or adenopathy. CT PELVIS: The bladder is very distended with smooth thin walls. The uterus and  adnexa are unremarkable. The rectum is normal. No free fluid accumulating  filling the pelvis. There is no inguinal hernia. No aggressive osseous lesions identified. Impression  1. Hydropic gallbladder with thin walls. Dependent sludge or small stones noted  in the gallbladder fundus. If there is clinical concern for cholecystitis,  consider further evaluation with right upper quadrant abdominal ultrasound. 2. Otherwise no acute abdominal or pelvic abnormality.  The appendix is normal.      US Result (most recent):  US ABDOMEN LIMITED 09/20/2022    Narrative  Right upper quadrant abdominal ultrasound. CLINICAL INDICATION: Cholelithiasis    PROCEDURE: Realtime grayscale and color Doppler evaluation of the right upper  abdominal quadrant. COMPARISON: CT of the abdomen and pelvis dated 9/20/2022    FINDINGS: The liver is normal in size and echogenicity without focal lesion the  gallbladder is distended and near completely full of gallstones. The gallbladder  wall is thin at 2 mm. A negative sonographic Montrose Doyne sign is reported. The common  bile duct measures 3.7 mm. The imaged portion the pancreas is unremarkable. The  right kidney is normal in size measuring 10.8 cm. There is no hydronephrosis. Echogenicity is normal. The aorta and IVC are patent and unremarkable. Impression  Distended gallbladder with cholelithiasis. The gallbladder is near  completely filled with stones. Assessment/Plan:     Joce Camacho is a 81 y.o. female who presents with recurrent abdominal pain and cholelithiasis with very enlarged GB. Hopefully symptoms are related to GB and stones as pathology. Unlikely that GB is functioning and will proceed with cholecystectomy. She has no jaundice. Strong possibility that procedure will convert to open with size limitation of GB. We discussed proceeding with laparoscopic, possible open cholecystectomy. We will give ICG for immunofluorescent cholangiography. I discussed the patient's condition and treatment options with the patient. I discussed risks of surgery in language the patient could understand including bleeding, infection, need for further surgery, abscess, fistula, SBO, DVT, PE, heart attack, stroke, renal failure, respiratory failure, ventilatory dependence, and death. The patient voiced understanding of all this and all questions were answered. Alternatives to surgery were discussed also and risks of the alternatives. The patient requested that we proceed with surgery.   Informed consent was obtained. Cholelithiasis with chronic cholecystitis  S/P 9/21/22 CHOLECYSTECTOMY LAPAROSCOPIC WITH ICG CHOLANGIOGRAPHY  >Care Management per Hospitalist  >doing well  >advance to FLD  >monitor for pain and treat   >Remove abd dressing- ok to leave open  >IS          Patient Active Problem List    Diagnosis Date Noted    Calculus of gallbladder with acute on chronic cholecystitis without obstruction 09/21/2022     Priority: Medium    Intractable pain 09/20/2022     Priority: Medium    Syncope 03/17/2022    SOB (shortness of breath) 03/17/2022    Acute gastric ulcer without hemorrhage or perforation 04/16/2021    Stage 3a chronic kidney disease ([facility] [address] 04/16/2021    Pain of right sacroiliac joint 01/05/2021    Right hip pain 01/05/2021    Grief 01/05/2021    Irritable bowel syndrome with both constipation and diarrhea 01/05/2021    Right lower quadrant abdominal pain 08/17/2020    Endometriosis 08/17/2020    Vitamin D deficiency 07/23/2020    Caregiver with fatigue 07/23/2020    Dental caries 07/23/2020    Flu-like symptoms 02/10/2020    Acute non-recurrent maxillary sinusitis 02/10/2020    Non-recurrent acute serous otitis media of both ears 02/10/2020    Osteopenia of neck of left femur 08/23/2019     T -2.3 August 2019  Repeat 2021        Paronychia of finger of right hand 08/30/2018    Sciatica of right side 04/03/2018    Raynaud's disease without gangrene 04/03/2018    Chronic neck pain 04/03/2018    Encounter for monitoring chronic NSAID therapy 04/03/2018    Chronic fatigue 04/03/2018    Exposure to varicella zoster virus (VZV) 04/03/2018          Aniket Simpson NP    [facility].  Agree with above evaluation and plan

## 2022-09-26 VITALS
TEMPERATURE: 97.3 F | OXYGEN SATURATION: 99 % | BODY MASS INDEX: 20.72 KG/M2 | SYSTOLIC BLOOD PRESSURE: 124 MMHG | HEART RATE: 56 BPM | DIASTOLIC BLOOD PRESSURE: 58 MMHG | HEIGHT: 67 IN | WEIGHT: 132 LBS | RESPIRATION RATE: 18 BRPM

## 2022-09-26 LAB
ANION GAP SERPL CALC-SCNC: 4 MMOL/L (ref 4–13)
BASOPHILS # BLD: 0 K/UL (ref 0–0.2)
BASOPHILS NFR BLD: 1 % (ref 0–2)
BUN SERPL-MCNC: 8 MG/DL (ref 8–23)
CALCIUM SERPL-MCNC: 8.5 MG/DL (ref 8.3–10.4)
CHLORIDE SERPL-SCNC: 108 MMOL/L (ref 101–110)
CO2 SERPL-SCNC: 28 MMOL/L (ref 21–32)
CREAT SERPL-MCNC: 0.5 MG/DL (ref 0.6–1)
DIFFERENTIAL METHOD BLD: ABNORMAL
EOSINOPHIL # BLD: 0.2 K/UL (ref 0–0.8)
EOSINOPHIL NFR BLD: 5 % (ref 0.5–7.8)
ERYTHROCYTE [DISTWIDTH] IN BLOOD BY AUTOMATED COUNT: 13.2 % (ref 11.9–14.6)
GLUCOSE SERPL-MCNC: 100 MG/DL (ref 65–100)
HCT VFR BLD AUTO: 29.8 % (ref 35.8–46.3)
HGB BLD-MCNC: 9.7 G/DL (ref 11.7–15.4)
IMM GRANULOCYTES # BLD AUTO: 0 K/UL (ref 0–0.5)
IMM GRANULOCYTES NFR BLD AUTO: 0 % (ref 0–5)
LYMPHOCYTES # BLD: 1.1 K/UL (ref 0.5–4.6)
LYMPHOCYTES NFR BLD: 25 % (ref 13–44)
MCH RBC QN AUTO: 31.4 PG (ref 26.1–32.9)
MCHC RBC AUTO-ENTMCNC: 32.6 G/DL (ref 31.4–35)
MCV RBC AUTO: 96.4 FL (ref 79.6–97.8)
MONOCYTES # BLD: 0.7 K/UL (ref 0.1–1.3)
MONOCYTES NFR BLD: 18 % (ref 4–12)
NEUTS SEG # BLD: 2.2 K/UL (ref 1.7–8.2)
NEUTS SEG NFR BLD: 51 % (ref 43–78)
NRBC # BLD: 0 K/UL (ref 0–0.2)
PHOSPHATE SERPL-MCNC: 3.3 MG/DL (ref 2.3–3.7)
PLATELET # BLD AUTO: 242 K/UL (ref 150–450)
PMV BLD AUTO: 9.7 FL (ref 9.4–12.3)
POTASSIUM SERPL-SCNC: 3.7 MMOL/L (ref 3.5–5.1)
RBC # BLD AUTO: 3.09 M/UL (ref 4.05–5.2)
SODIUM SERPL-SCNC: 140 MMOL/L (ref 136–145)
WBC # BLD AUTO: 4.2 K/UL (ref 4.3–11.1)

## 2022-09-26 PROCEDURE — 80048 BASIC METABOLIC PNL TOTAL CA: CPT

## 2022-09-26 PROCEDURE — 2580000003 HC RX 258: Performed by: INTERNAL MEDICINE

## 2022-09-26 PROCEDURE — 84100 ASSAY OF PHOSPHORUS: CPT

## 2022-09-26 PROCEDURE — 36415 COLL VENOUS BLD VENIPUNCTURE: CPT

## 2022-09-26 PROCEDURE — 6370000000 HC RX 637 (ALT 250 FOR IP): Performed by: INTERNAL MEDICINE

## 2022-09-26 PROCEDURE — 85025 COMPLETE CBC W/AUTO DIFF WBC: CPT

## 2022-09-26 PROCEDURE — 6370000000 HC RX 637 (ALT 250 FOR IP): Performed by: NURSE PRACTITIONER

## 2022-09-26 PROCEDURE — 97530 THERAPEUTIC ACTIVITIES: CPT

## 2022-09-26 RX ORDER — PANTOPRAZOLE SODIUM 40 MG/1
40 TABLET, DELAYED RELEASE ORAL
Qty: 30 TABLET | Refills: 3 | Status: SHIPPED | OUTPATIENT
Start: 2022-09-27

## 2022-09-26 RX ORDER — ONDANSETRON 4 MG/1
4 TABLET, ORALLY DISINTEGRATING ORAL EVERY 8 HOURS PRN
Qty: 30 TABLET | Refills: 0 | Status: SHIPPED | OUTPATIENT
Start: 2022-09-26 | End: 2022-10-06

## 2022-09-26 RX ORDER — OXYCODONE HYDROCHLORIDE AND ACETAMINOPHEN 5; 325 MG/1; MG/1
1 TABLET ORAL EVERY 6 HOURS PRN
Qty: 20 TABLET | Refills: 0 | Status: SHIPPED | OUTPATIENT
Start: 2022-09-26 | End: 2022-10-01

## 2022-09-26 RX ORDER — PSEUDOEPHEDRINE HCL 30 MG
100 TABLET ORAL DAILY
Qty: 30 CAPSULE | Refills: 0 | Status: SHIPPED | OUTPATIENT
Start: 2022-09-27 | End: 2022-10-27

## 2022-09-26 RX ADMIN — OXYCODONE 10 MG: 5 TABLET ORAL at 03:49

## 2022-09-26 RX ADMIN — GABAPENTIN 100 MG: 100 CAPSULE ORAL at 08:15

## 2022-09-26 RX ADMIN — POLYETHYLENE GLYCOL 3350 17 G: 17 POWDER, FOR SOLUTION ORAL at 08:37

## 2022-09-26 RX ADMIN — DOCUSATE SODIUM 100 MG: 100 CAPSULE, LIQUID FILLED ORAL at 08:15

## 2022-09-26 RX ADMIN — ACETAMINOPHEN 1000 MG: 500 TABLET, FILM COATED ORAL at 06:15

## 2022-09-26 RX ADMIN — SODIUM CHLORIDE, PRESERVATIVE FREE 10 ML: 5 INJECTION INTRAVENOUS at 08:38

## 2022-09-26 RX ADMIN — PANTOPRAZOLE SODIUM 40 MG: 40 TABLET, DELAYED RELEASE ORAL at 06:15

## 2022-09-26 ASSESSMENT — PAIN DESCRIPTION - DESCRIPTORS: DESCRIPTORS: ACHING

## 2022-09-26 ASSESSMENT — PAIN DESCRIPTION - ORIENTATION: ORIENTATION: ANTERIOR

## 2022-09-26 ASSESSMENT — PAIN SCALES - GENERAL
PAINLEVEL_OUTOF10: 10
PAINLEVEL_OUTOF10: 0

## 2022-09-26 ASSESSMENT — PAIN DESCRIPTION - LOCATION: LOCATION: ABDOMEN

## 2022-09-26 NOTE — PROGRESS NOTES
Pt is resting in bed with no complaints of pain or discomfort at this time. Pt is alert and oriented times 4. Pt is on room air with even and unlabored respirations. Call light is in place.  Will continue to monitor

## 2022-09-26 NOTE — PROGRESS NOTES
Discharge instructions reviewed with pt. Pt verbalized understanding of all instructions and home medications. All prescriptions sent to pharm. Pt sent home with all personal belongings. PIV removed prior to d/c. No issues or concerns verbalized at this time.

## 2022-09-26 NOTE — PROGRESS NOTES
[address]  [phone]    Office Note/Consult Note   Rita Ruiz   MRN: [id number]     : 1940        HPI: Rita Ruiz is a 81 y.o. female who is seen at request of Dr. Maulik Jones for intractable pain from cholelithiasis. She has had years of symptoms that could have been biliary colic vs. IBS vs. Chronic constipation. She has h/o endometriosis. She has had CT imaging which did/does not show her gallstones that are well seen and fill her GB on US. Her GB is enlarged with a thin wall. She has several medical issues, but no prior abdominal surgeries. She is currently without pain, but would like to proceed with cholecystectomy. I have personally reviewed images including previous CT which did not describe the enlarged GB present. It is only slightly smaller than current scan.    22 CHOLECYSTECTOMY LAPAROSCOPIC WITH ICG CHOLANGIOGRAPHY    21 POD1: \"thirsty\" with abdominal pain as expected. VSS/AF. Abd: soft, midline and trochar site dressings CDI. Tenderness with exam.   WBC 15, Hgb 11.2, Cr 0.8    21 POD3: Pt sitting up in bed. C/o abdominal pain. Tried Full liquids yesterday but felt bloated. Concerned about not having a BM. States relies on mag citrate at home. Reports +flatus/-BM. AF, NAD. WBC 6.4, Hgb 10.2.    21 POD4: Pt standing  up in room stretching legs. Feeling better today. Has been tolerating Clear Liquids. No nausea or vomiting. Reports +flatus/+BM. AF, NAD. WBC 4.5, Hgb 9.8.    21 POD5: Pt in bed. Requiring narcotics for pain overnight. Has been tolerating Clear Liquids and FLD yesterday with BM/diarrhea. No nausea or vomiting. Reports +flatus/+BM. AF, NAD. ABD: resolving ecchymosis, soft & flat, TTP as expected.        Past Medical History:   Diagnosis Date    Constipation     Duodenal ulcer     Endometriosis      Past Surgical History: Procedure Laterality Date    CERVICAL LAMINECTOMY  1998    CHOLECYSTECTOMY, LAPAROSCOPIC N/A 9/21/2022    CHOLECYSTECTOMY LAPAROSCOPIC performed by Ivett Phelps MD at [facility] MAIN OR    TONSILLECTOMY      TUBAL LIGATION       Current Facility-Administered Medications   Medication Dose Route Frequency    calcium carbonate (TUMS) chewable tablet 500 mg  500 mg Oral TID PRN    enoxaparin (LOVENOX) injection 40 mg  40 mg SubCUTAneous Daily    benzocaine-menthol (CEPACOL SORE THROAT) lozenge 1 lozenge  1 lozenge Oral Q2H PRN    phenol 1.4 % mouth spray 1 spray  1 spray Mouth/Throat Q2H PRN    acetaminophen (TYLENOL) tablet 1,000 mg  1,000 mg Oral 3 times per day    oxyCODONE (ROXICODONE) immediate release tablet 5 mg  5 mg Oral Q4H PRN    oxyCODONE (ROXICODONE) immediate release tablet 10 mg  10 mg Oral Q4H PRN    gabapentin (NEURONTIN) capsule 100 mg  100 mg Oral TID    medicated lip ointment (BLISTEX)   Topical PRN    sodium chloride flush 0.9 % injection 5-40 mL  5-40 mL IntraVENous 2 times per day    sodium chloride flush 0.9 % injection 5-40 mL  5-40 mL IntraVENous PRN    0.9 % sodium chloride infusion   IntraVENous PRN    potassium chloride (KLOR-CON M) extended release tablet 40 mEq  40 mEq Oral PRN    Or    potassium bicarb-citric acid (EFFER-K) effervescent tablet 40 mEq  40 mEq Oral PRN    Or    potassium chloride 10 mEq/100 mL IVPB (Peripheral Line)  10 mEq IntraVENous PRN    potassium chloride 10 mEq/100 mL IVPB (Peripheral Line)  10 mEq IntraVENous PRN    magnesium sulfate 2000 mg in 50 mL IVPB premix  2,000 mg IntraVENous PRN    ondansetron (ZOFRAN-ODT) disintegrating tablet 4 mg  4 mg Oral Q8H PRN    Or    ondansetron (ZOFRAN) injection 4 mg  4 mg IntraVENous Q6H PRN    polyethylene glycol (GLYCOLAX) packet 17 g  17 g Oral BID    famotidine (PEPCID) tablet 10 mg  10 mg Oral Daily PRN    aluminum & magnesium hydroxide-simethicone (MAALOX) 200-200-20 MG/5ML suspension 30 mL  30 mL Oral Q6H PRN    docusate sodium (COLACE) capsule 100 mg  100 mg Oral Daily    pantoprazole (PROTONIX) tablet 40 mg  40 mg Oral QAM AC    morphine injection 2 mg  2 mg IntraVENous Q3H PRN     ALLERGIES:  Sulfamethoxazole-trimethoprim and Oxycodone-acetaminophen    Social History     Socioeconomic History    Marital status:      Spouse name: None    Number of children: None    Years of education: None    Highest education level: None   Tobacco Use    Smoking status: Never    Smokeless tobacco: Never   Substance and Sexual Activity    Alcohol use: No    Drug use: No     Social History     Tobacco Use   Smoking Status Never   Smokeless Tobacco Never     Family History   Problem Relation Age of Onset    Diabetes Brother     Diabetes Father     Heart Disease Mother     Heart Disease Brother        ROS: The patient has no difficulty with chest pain or shortness of breath. No fever or chills. The patient denies any personal or family history of abnormal clotting or bleeding. Comprehensive review of systems was otherwise unremarkable except as noted above. Physical Exam:   Constitutional: Alert oriented cooperative patient in no acute distress. BP (!) 124/58   Pulse 56   Temp 97.3 °F (36.3 °C) (Oral)   Resp 18   Ht 5' 7\" (1.702 m)   Wt 132 lb (59.9 kg)   SpO2 99%   BMI 20.67 kg/m²   Eyes: Sclera are clear without icterus. ENMT: no obvious neck masses, no ear or lip lesions  CV: RRR. Normal perfusion  Resp: No JVD. Breathing is  non-labored. GI: thin, soft and non distended. Appropriately ttp with midline and trochar dressing sites CDI. Resolving ecchymosis. Musculoskeletal: unremarkable with normal function.    Neuro:  No obvious focal deficits  Psychiatric: normal affect and mood, no memory impairment    Lab Results   Component Value Date/Time    WBC 4.2 09/26/2022 04:29 AM    HGB 9.7 09/26/2022 04:29 AM    HCT 29.8 09/26/2022 04:29 AM     09/26/2022 04:29 AM    MCV 96.4 09/26/2022 04:29 AM       Lab Results Component Value Date     09/26/2022    K 3.7 09/26/2022     09/26/2022    CO2 28 09/26/2022    BUN 8 09/26/2022    CREATININE 0.50 (L) 09/26/2022    GLUCOSE 100 09/26/2022    CALCIUM 8.5 09/26/2022    PROT 6.9 09/20/2022    LABALBU 4.6 09/20/2022    BILITOT 0.5 09/20/2022    ALKPHOS 105 09/20/2022    AST 28 09/20/2022    ALT 28 09/20/2022    LABGLOM >60 09/26/2022    GFRAA >60 09/26/2022    AGRATIO 0.9 (L) 03/17/2022    GLOB 2.3 09/20/2022         CT Result (most recent):  CT ABDOMEN PELVIS W IV CONTRAST 09/20/2022    Narrative  CT OF THE ABDOMEN AND PELVIS WITH CONTRAST. CLINICAL INDICATION: Right lower quadrant abdominal pain    PROCEDURE: Serial thin section axial images obtained from the lung bases through  the proximal femurs following the administration of 100 cc of Isovue 370  intravenous contrast.  Radiation dose reduction techniques were used for this  study. Our CT scanners use one or all of the following: Automated exposure  control, adjusted of the mA and/or kV according to patient size, iterative  reconstruction    COMPARISON: CT abdomen and pelvis dated 8/21/2020    FINDINGS:    CT ABDOMEN: The gallbladder is very distended with a small amount of either  dependent sludge or small gallstones. The gallbladder wall is thin. No  pericholecystic inflammatory fat stranding. The appendix is normal. The bowel is  normal in course, caliber, and wall thickness. The stomach is decompressed. The  pancreas is normal. The liver and spleen are unremarkable. The kidneys are  symmetric in size and contrast enhancement. There is no hydronephrosis. The  adrenal glands are normal. There is no ascites or adenopathy. CT PELVIS: The bladder is very distended with smooth thin walls. The uterus and  adnexa are unremarkable. The rectum is normal. No free fluid accumulating  filling the pelvis. There is no inguinal hernia. No aggressive osseous lesions identified. Impression  1.  Hydropic gallbladder with thin walls. Dependent sludge or small stones noted  in the gallbladder fundus. If there is clinical concern for cholecystitis,  consider further evaluation with right upper quadrant abdominal ultrasound. 2. Otherwise no acute abdominal or pelvic abnormality. The appendix is normal.      US Result (most recent):  US ABDOMEN LIMITED 09/20/2022    Narrative  Right upper quadrant abdominal ultrasound. CLINICAL INDICATION: Cholelithiasis    PROCEDURE: Realtime grayscale and color Doppler evaluation of the right upper  abdominal quadrant. COMPARISON: CT of the abdomen and pelvis dated 9/20/2022    FINDINGS: The liver is normal in size and echogenicity without focal lesion the  gallbladder is distended and near completely full of gallstones. The gallbladder  wall is thin at 2 mm. A negative sonographic Sole Salon sign is reported. The common  bile duct measures 3.7 mm. The imaged portion the pancreas is unremarkable. The  right kidney is normal in size measuring 10.8 cm. There is no hydronephrosis. Echogenicity is normal. The aorta and IVC are patent and unremarkable. Impression  Distended gallbladder with cholelithiasis. The gallbladder is near  completely filled with stones. Assessment/Plan:     Miguel Worley is a 81 y.o. female who presents with recurrent abdominal pain and cholelithiasis with very enlarged GB. Hopefully symptoms are related to GB and stones as pathology. Unlikely that GB is functioning and will proceed with cholecystectomy. She has no jaundice. Strong possibility that procedure will convert to open with size limitation of GB. We discussed proceeding with laparoscopic, possible open cholecystectomy. We will give ICG for immunofluorescent cholangiography. I discussed the patient's condition and treatment options with the patient.   I discussed risks of surgery in language the patient could understand including bleeding, infection, need for further surgery, abscess, fistula, SBO, DVT, PE, heart attack, stroke, renal failure, respiratory failure, ventilatory dependence, and death. The patient voiced understanding of all this and all questions were answered. Alternatives to surgery were discussed also and risks of the alternatives. The patient requested that we proceed with surgery. Informed consent was obtained. Cholelithiasis with chronic cholecystitis  S/P 9/21/22 CHOLECYSTECTOMY LAPAROSCOPIC WITH ICG CHOLANGIOGRAPHY  >Care Management per Hospitalist  >doing well  >advance to FLD  >monitor for pain and treat   >Remove abd dressing- ok to leave open  >IS  OK to DC from surgery standpoint. Discussed with patient that it is not unusual to remain on FLD/soft diet for days post op until return of \"normal\" bowel function.     Follow up w General surgery in dc orders        Patient Active Problem List    Diagnosis Date Noted    Cholelithiasis without cholecystitis 09/25/2022     Priority: Medium    Calculus of gallbladder with acute on chronic cholecystitis without obstruction 09/21/2022     Priority: Medium    Intractable pain 09/20/2022     Priority: Medium    Syncope 03/17/2022    SOB (shortness of breath) 03/17/2022    Acute gastric ulcer without hemorrhage or perforation 04/16/2021    Stage 3a chronic kidney disease ([facility] [address] 04/16/2021    Pain of right sacroiliac joint 01/05/2021    Right hip pain 01/05/2021    Grief 01/05/2021    Irritable bowel syndrome with both constipation and diarrhea 01/05/2021    Right lower quadrant abdominal pain 08/17/2020    Endometriosis 08/17/2020    Vitamin D deficiency 07/23/2020    Caregiver with fatigue 07/23/2020    Dental caries 07/23/2020    Flu-like symptoms 02/10/2020    Acute non-recurrent maxillary sinusitis 02/10/2020    Non-recurrent acute serous otitis media of both ears 02/10/2020    Osteopenia of neck of left femur 08/23/2019     T -2.3 August 2019  Repeat 2021        Paronychia of finger of right hand 08/30/2018    Sciatica of right side 04/03/2018    Raynaud's disease without gangrene 04/03/2018    Chronic neck pain 04/03/2018    Encounter for monitoring chronic NSAID therapy 04/03/2018    Chronic fatigue 04/03/2018    Exposure to varicella zoster virus (VZV) 04/03/2018        Danika Giraldo, TYREE - CNP

## 2022-09-26 NOTE — PROGRESS NOTES
ACUTE PHYSICAL THERAPY GOALS:   (Developed with and agreed upon by patient and/or caregiver. )  LTG:  (1.)Ms. Evan Schulte will move from supine to sit and sit to supine , scoot up and down, and roll side to side in bed with STAND BY ASSIST within 7 treatment day(s). (2.)Ms. Evan Schulte will transfer from bed to chair and chair to bed with STAND BY ASSIST using the least restrictive device within 7 treatment day(s). (3.)Ms. Evan Schulte will ambulate with STAND BY ASSIST for 500+ feet with the least restrictive device within 7 treatment day(s). ________________________________________________________________________________________________        PHYSICAL THERAPY: Daily Note AM   (Link to Caseload Tracking: PT Visit Days : 2  Time In/Out PT Charge Capture  Rehab Caseload Tracker  Orders    Teto Marcos is a 81 y.o. female   PRIMARY DIAGNOSIS: Intractable pain  Intractable pain [R52]  Cholelithiasis without cholecystitis [K80.20]  Procedure(s) (LRB):  CHOLECYSTECTOMY LAPAROSCOPIC (N/A)  5 Days Post-Op  Inpatient: Payor: Stanford Culver / Plan: Vincenzo Higgins / Product Type: *No Product type* /     ASSESSMENT:     REHAB RECOMMENDATIONS:   Recommendation to date pending progress:  Setting:  [facility]:    None     ASSESSMENT:  Ms. Evan Schulte was up in hallway ambulating with family without use of assistive device. Patient ambulated 1000' without LOB or difficulties noted. Reviewed d/c instructions regarding PT in preparation for discharge home later today. Answered all questions and addressed concerns regarding mobility. Will continue PT efforts.      SUBJECTIVE:   Ms. Evan Schulte states, \"I'm going home today\"     Social/Functional Lives With: Alone  Type of Home: House  Home Layout: One level  Home Access: Ramped entrance  Bathroom Shower/Tub: Walk-in shower  Receives Help From: Family  ADL Assistance: [address] [place] [address] Avenue: [address] [place] [address] Responsibilities: Yes  Ambulation Assistance: Independent  Transfer Assistance: Independent  Active : Yes  Mode of Transportation: [employer]  Occupation: Retired, Part time employment  Type of Occupation: Teacher  OBJECTIVE:     PAIN: VITALS / O2: PRECAUTION / Jass Shah / DRAINS:   Pre Treatment:   Pain Level: 0      Post Treatment: 0 Vitals        Oxygen    None    RESTRICTIONS/PRECAUTIONS:  Restrictions/Precautions  Restrictions/Precautions: Surgical Protocols  Restrictions/Precautions: Surgical Protocols     MOBILITY: I Mod I S SBA CGA Min Mod Max Total  NT x2 Comments:   Bed Mobility    Rolling [] [] [] [] [] [] [] [] [] [] []    Supine to Sit [] [] [] [] [] [] [] [] [] [] []    Scooting [] [] [] [] [] [] [] [] [] [] []    Sit to Supine [] [] [] [] [] [] [] [] [] [] []    Transfers    Sit to Stand [] [] [] [] [] [] [] [] [] [] []    Bed to Chair [] [] [] [] [] [] [] [] [] [] []    Stand to Sit [] [] [] [] [] [] [] [] [] [] []     [] [] [] [] [] [] [] [] [] [] []    I=Independent, Mod I=Modified Independent, S=Supervision, SBA=Standby Assistance, CGA=Contact Guard Assistance,   Min=Minimal Assistance, Mod=Moderate Assistance, Max=Maximal Assistance, Total=Total Assistance, NT=Not Tested    BALANCE: Good Fair+ Fair Fair- Poor NT Comments   Sitting Static [x] [] [] [] [] []    Sitting Dynamic [x] [] [] [] [] []              Standing Static [x] [] [] [] [] []    Standing Dynamic [] [x] [] [] [] []      GAIT: I Mod I S SBA CGA Min Mod Max Total  NT x2 Comments:   Level of Assistance [] [] [x] [] [] [] [] [] [] [] []    Distance 1000  feet    DME None    Gait Quality Path deviations     Weightbearing Status      Stairs      I=Independent, Mod I=Modified Independent, S=Supervision, SBA=Standby Assistance, CGA=Contact Guard Assistance,   Min=Minimal Assistance, Mod=Moderate Assistance, Max=Maximal Assistance, Total=Total Assistance, NT=Not Tested    PLAN:   FREQUENCY AND DURATION: 3 times/week for duration of hospital stay or until stated goals are met, whichever comes first.    TREATMENT:   TREATMENT:   Therapeutic Activity (9 Minutes): Therapeutic activity included Ambulation on level ground to improve functional Activity tolerance, Balance, Mobility, and Strength. TREATMENT GRID:  N/A    AFTER TREATMENT PRECAUTIONS: Needs within reach and Visitors at bedside    INTERDISCIPLINARY COLLABORATION:  RN/ PCT and PT/ PTA    EDUCATION:      TIME IN/OUT:  Time In: 1037  Time Out: [address]  Minutes: [facility] LEO Reaves PTA

## 2022-09-26 NOTE — CARE COORDINATION
MSN, CM: patient to be discharged home today with no services ordered or requested. Patient and family agree with this discharge plan. Patient has met all milestones for this admission. Family to transport patient home. Bedside rounding completed with Dr. Arron Edwards this AM     09/26/22 1115   Service Assessment   Patient Orientation Alert and [facility] Discharge   Services At/After Discharge None   Mode of Transport at Discharge Other (see comment)  (family to transport)   Confirm Follow Up Transport Family   Condition of Participation: Discharge Planning   The Patient and/or Patient Representative was provided with a Choice of Provider? Patient   The Patient and/Or Patient Representative agree with the Discharge Plan?  Yes

## 2022-09-26 NOTE — DISCHARGE SUMMARY
Hospitalist Discharge Summary   Admit Date:  2022  6:39 PM   DC Note date: 2022  Name:  Lukasz Ferrer   Age:  81 y.o. Sex:  female  :  1940   MRN:  [id number]   Room:  [facility]  PCP:  Radha Orourke MD    Presenting Complaint: No chief complaint on file. Initial Admission Diagnosis: Intractable pain [R52]  Cholelithiasis without cholecystitis [K80.20]     Problem List for this Hospitalization (present on admission):    Principal Problem:    Intractable pain  Active Problems:    Calculus of gallbladder with acute on chronic cholecystitis without obstruction    Cholelithiasis without cholecystitis  Resolved Problems:    * No resolved hospital problems. *      Hospital Course:  Lukasz Ferrer is a 81 y.o. female with medical history of chronic constipation, remote peptic ulcer duodenal  and significant endometriosis. She has had a cervical laminectomy tonsillectomy and tubal ligation in the past but relatively benign medical history for her age. Who presented with 1-1/2-year history of postprandial abdominal discomfort-recently over the past several weeks increased abdominal discomfort and particularly right lower quadrant pain which progressed to the point where she sought care in an emergency department  at [facility]. Initially felt to be urinary source but urinalysis negative and CT abdomen and pelvis revealed sludge versus other and a very thin-walled hydropic gallbladder which prompted ultrasound which showed that the gallbladder is nearly completely filled with stones. CT imaging which did/does not show her gallstones that are well seen and fill her GB on US. Her GB is enlarged with a thin wall. Patient had laparoscopic cholecystectomy with ICG cholangiography by surgery on . Pain management with IV medications. Pt tolerated diet and clinically improved. PT/OT recommended no further skilled therapy. Pt is hemodynamically stable for discharge. Disposition: Home  Diet: No diet orders on file  Code Status: Prior    Follow Ups:   Follow-up Information     Kendra Maurice MD Follow up on 10/10/2022. Specialty: Family Medicine  Why: 9 am  Contact information:  [address]  [phone]             [facility] SURGICAL - MAIN Follow up on 10/4/2022. Why: @1:15 with Dr. Melissa Chris information:  [address] [place] [id number]                      Time spent in patient discharge and coordination 35 minutes. Follow up labs/diagnostics (ultimately defer to outpatient provider): Follow up with PCP in 3-5 days   Follow up with Surgery in 7-10 days    Plan was discussed with patient,   All questions answered. Patient was stable at time of discharge. Instructions given to call a physician or return if any concerns. Discharge Medication List as of 9/26/2022 11:26 AM        START taking these medications    Details   ondansetron (ZOFRAN-ODT) 4 MG disintegrating tablet Take 1 tablet by mouth every 8 hours as needed for Nausea or Vomiting, Disp-30 tablet, R-0Normal      docusate sodium (COLACE, DULCOLAX) 100 MG CAPS Take 100 mg by mouth daily, Disp-30 capsule, R-0Normal      pantoprazole (PROTONIX) 40 MG tablet Take 1 tablet by mouth every morning (before breakfast), Disp-30 tablet, R-3Normal      oxyCODONE-acetaminophen (PERCOCET) 5-325 MG per tablet Take 1 tablet by mouth every 6 hours as needed for Pain for up to 5 days. Intended supply: 5 days.  Take lowest dose possible to manage pain, Disp-20 tablet, R-0Print           CONTINUE these medications which have NOT CHANGED    Details   polyethylene glycol (GLYCOLAX) 17 g packet Take by mouth as neededHistorical Med      Magnesium Citrate 100 MG CAPS Take by mouthHistorical Med      DIPHENHYDRAMINE HCL PO Take by mouthHistorical Med      FEXOFENADINE HCL PO Take by mouthHistorical Med      Multiple Vitamins-Minerals (MULTIVITAMIN ADULTS PO) Take 1 tablet by mouth dailyHistorical Med      Probiotic Product (PROBIOTIC BLEND PO) Strength: ; Form: ; SIG: take 1 tab  dailyHistorical Med      sucralfate (CARAFATE) 1 GM tablet TAKE 1 TABLET BY MOUTH FOUR TIMES DAILYHistorical Med             Procedures done this admission:  Procedure(s):  CHOLECYSTECTOMY LAPAROSCOPIC    Consults this admission:  IP CONSULT TO GENERAL SURGERY  IP CONSULT TO SPIRITUAL SERVICES  IP CONSULT TO PHYSICAL THERAPY  IP CONSULT TO OCCUPATIONAL THERAPY    Echocardiogram results:  No results found for this or any previous visit. Diagnostic Imaging/Tests:   CT ABDOMEN PELVIS W IV CONTRAST Additional Contrast? None    Result Date: 9/20/2022  1. Hydropic gallbladder with thin walls. Dependent sludge or small stones noted in the gallbladder fundus. If there is clinical concern for cholecystitis, consider further evaluation with right upper quadrant abdominal ultrasound. 2. Otherwise no acute abdominal or pelvic abnormality. The appendix is normal.     XR CHEST PORTABLE    Result Date: 9/22/2022  No acute abnormality. US ABDOMEN LIMITED Specify organ? GALLBLADDER    Result Date: 9/20/2022  Distended gallbladder with cholelithiasis. The gallbladder is near completely filled with stones.         Labs: Results:       BMP, Mg, Phos Recent Labs     09/24/22  0354 09/25/22 0406 09/26/22 0429    139 140   K 3.8 3.6 3.7    108 108   CO2 28 28 28   ANIONGAP 5 3* 4   BUN 8 7* 8   CREATININE 0.60 0.50* 0.50*   LABGLOM >60 >60 >60   GFRAA >60 >60 >60   CALCIUM 8.5 8.2* 8.5   GLUCOSE 105* 95 100   PHOS  --  2.6 3.3      CBC Recent Labs     09/24/22  0354 09/25/22 0406 09/26/22  0429   WBC 6.4 4.5 4.2*   RBC 3.20* 3.17* 3.09*   HGB 10.2* 9.8* 9.7*   HCT 30.6* 30.5* 29.8*   MCV 95.6 96.2 96.4   MCH 31.9 30.9 31.4   MCHC 33.3 32.1 32.6   RDW 13.0 13.2 13.2    215 242   MPV 9.9 9.9 9.7   NRBC 0.00 0.00 0.00   SEGS 67 52 51   LYMPHOPCT 14 25 25   EOSRELPCT 3 5 5   MONOPCT 15* 17* 18*   BASOPCT 1 1 1   IMMGRAN 0 0 0   SEGSABS 4.4 2.4 2.2   LYMPHSABS 0.9 1.1 1.1   EOSABS 0.2 0.2 0.2   MONOSABS 0.9 0.8 0.7   BASOSABS 0.0 0.0 0.0   ABSIMMGRAN 0.0 0.0 0.0      LFT No results for input(s): BILITOT, BILIDIR, ALKPHOS, AST, ALT, PROT, LABALBU, GLOB in the last 72 hours.    Cardiac  No results found for: NTPROBNP, TROPHS   Coags No results found for: PROTIME, INR, APTT   A1c No results found for: LABA1C, EAG   Lipids No results found for: CHOL, LDLCALC, LABVLDL, HDL, CHOLHDLRATIO, TRIG   Thyroid  No results found for: Merril Monday     Most Recent UA Lab Results   Component Value Date/Time    COLORU YELLOW 09/20/2022 09:48 AM    APPEARANCE SLIGHTLY CLOUDY 09/20/2022 09:48 AM    SPECGRAV 1.015 09/20/2022 09:48 AM    LABPH 8.0 09/20/2022 09:48 AM    PROTEINU Negative 09/20/2022 09:48 AM    GLUCOSEU Negative 09/20/2022 09:48 AM    KETUA Negative 09/20/2022 09:48 AM    BILIRUBINUR Negative 09/20/2022 09:48 AM    BILIRUBINUR Negative 03/17/2022 03:12 PM    BLOODU Negative 09/20/2022 09:48 AM    UROBILINOGEN 0.2 09/20/2022 09:48 AM    NITRU Negative 09/20/2022 09:48 AM    LEUKOCYTESUR Negative 09/20/2022 09:48 AM        Recent Labs     09/22/22  0842 09/22/22  0841   CULTURE NO GROWTH 4 DAYS NO GROWTH 4 DAYS       All Labs from Last 24 Hrs:  Recent Results (from the past 24 hour(s))   Phosphorus    Collection Time: 09/26/22  4:29 AM   Result Value Ref Range    Phosphorus 3.3 2.3 - 3.7 MG/DL   CBC with Auto Differential    Collection Time: 09/26/22  4:29 AM   Result Value Ref Range    WBC 4.2 (L) 4.3 - 11.1 K/uL    RBC 3.09 (L) 4.05 - 5.2 M/uL    Hemoglobin 9.7 (L) 11.7 - 15.4 g/dL    Hematocrit 29.8 (L) 35.8 - 46.3 %    MCV 96.4 79.6 - 97.8 FL    MCH 31.4 26.1 - 32.9 PG    MCHC 32.6 31.4 - 35.0 g/dL    RDW 13.2 11.9 - 14.6 %    Platelets 982 435 - 046 K/uL    MPV 9.7 9.4 - 12.3 FL    nRBC 0.00 0.0 - 0.2 K/uL    Differential Type AUTOMATED      Seg Neutrophils 51 43 - 78 % Lymphocytes 25 13 - 44 %    Monocytes 18 (H) 4.0 - 12.0 %    Eosinophils % 5 0.5 - 7.8 %    Basophils 1 0.0 - 2.0 %    Immature Granulocytes 0 0.0 - 5.0 %    Segs Absolute 2.2 1.7 - 8.2 K/UL    Absolute Lymph # 1.1 0.5 - 4.6 K/UL    Absolute Mono # 0.7 0.1 - 1.3 K/UL    Absolute Eos # 0.2 0.0 - 0.8 K/UL    Basophils Absolute 0.0 0.0 - 0.2 K/UL    Absolute Immature Granulocyte 0.0 0.0 - 0.5 K/UL   Basic Metabolic Panel w/ Reflex to MG    Collection Time: 09/26/22  4:29 AM   Result Value Ref Range    Sodium 140 136 - 145 mmol/L    Potassium 3.7 3.5 - 5.1 mmol/L    Chloride 108 101 - 110 mmol/L    CO2 28 21 - 32 mmol/L    Anion Gap 4 4 - 13 mmol/L    Glucose 100 65 - 100 mg/dL    BUN 8 8 - 23 MG/DL    Creatinine 0.50 (L) 0.6 - 1.0 MG/DL    GFR African American >60 >60 ml/min/1.73m2    GFR Non- >60 >60 ml/min/1.73m2    Calcium 8.5 8.3 - 10.4 MG/DL       Allergies   Allergen Reactions    Sulfamethoxazole-Trimethoprim Hives    Oxycodone-Acetaminophen Nausea And Vomiting     Immunization History   Administered Date(s) Administered    COVID-19, PFIZER PURPLE top, DILUTE for use, (age 15 y+), 30mcg/0.3mL 02/12/2021       Recent Vital Data:  Patient Vitals for the past 24 hrs:   Temp Pulse Resp BP SpO2   09/26/22 0729 97.3 °F (36.3 °C) 56 18 (!) 124/58 99 %   09/26/22 0349 -- -- 18 -- --   09/26/22 0328 98 °F (36.7 °C) 64 14 111/71 97 %   09/25/22 2323 98.5 °F (36.9 °C) 57 13 (!) 113/45 96 %   09/25/22 2042 97.5 °F (36.4 °C) 77 18 (!) 140/49 100 %   09/25/22 1553 97.5 °F (36.4 °C) 59 19 (!) 111/55 99 %       Oxygen Therapy  SpO2: 99 %  Pulse Oximeter Device Mode: Intermittent  Pulse Oximeter Device Location: Finger  O2 Device: None (Room air)  O2 Flow Rate (L/min): 2 L/min    Estimated body mass index is 20.67 kg/m² as calculated from the following:    Height as of this encounter: 5' 7\" (1.702 m). Weight as of this encounter: 132 lb (59.9 kg).     Intake/Output Summary (Last 24 hours) at 9/26/2022 1522  Last data filed at 9/26/2022 0830  Gross per 24 hour   Intake 240 ml   Output --   Net 240 ml         Physical Exam:    General:    Well nourished. No overt distress  Head:  Normocephalic, atraumatic  Eyes:  Sclerae appear normal.  Pupils equally round. HENT:  Nares appear normal, no drainage. Moist mucous membranes  Neck:  No restricted ROM. Trachea midline  CV:   RRR. No m/r/g. No JVD  Lungs:   CTAB. No wheezing, rhonchi, or rales. Respirations even, unlabored  Abdomen:   Soft, nontender, nondistended. Extremities: Warm and dry. No cyanosis or clubbing. No edema. Skin:     No rashes. Normal coloration  Neuro:  CN II-XII grossly intact. Psych:  Normal mood and affect. Signed:  Sven Grove MD    Part of this note may have been written by using a voice dictation software. The note has been proof read but may still contain some grammatical/other typographical errors.

## 2022-09-27 ENCOUNTER — CARE COORDINATION (OUTPATIENT)
Dept: CARE COORDINATION | Facility: CLINIC | Age: 82
End: 2022-09-27

## 2022-09-27 LAB
BACTERIA SPEC CULT: NORMAL
BACTERIA SPEC CULT: NORMAL
SERVICE CMNT-IMP: NORMAL
SERVICE CMNT-IMP: NORMAL

## 2022-09-27 NOTE — CARE COORDINATION
Care Transitions Outreach Attempt    Call within 2 business days of discharge: Yes   Attempted to reach patient for transitions of care follow up. Unable to reach patient. Patient: Simi Bangura Patient : 1940 MRN: [id number]    Last Discharge [facility]       Date Complaint Diagnosis Description Type Department Provider    22  Cholelithiasis without cholecystitis Admission (Discharged) Goldie Walker MD    22 Groin Pain Gall bladder stones . .. ED (TRANSFER) [facility] Chani Rao, DO              Was this an external facility discharge?  No Discharge Facility: [facility]    Noted following upcoming appointments from discharge chart review:   [facility]  follow up appointment(s):   Future Appointments   Date Time Provider Bari Johnson   10/4/2022  1:15 PM Madelaine Buckner MD [facility] AMB   10/10/2022  9:00 AM Luiz Sarmiento MD [facility] AMB   10/28/2022  9:00 AM Luiz Sarmiento MD [facility] AMB     [facility] follow up appointment(s): na

## 2022-09-27 NOTE — CARE COORDINATION
Care Transitions Outreach Attempt    Call within 2 business days of discharge: Yes   Attempted to reach patient for transitions of care follow up. Unable to reach patient. Patient: Vince Ruelas Patient : 1940 MRN: [id number]    Last Discharge [facility]       Date Complaint Diagnosis Description Type Department Provider    22  Cholelithiasis without cholecystitis Admission (Discharged) Abdelrahman Garcia MD    22 Groin Pain Gall bladder stones . .. ED (TRANSFER) [facility] Will Blas, DO              Was this an external facility discharge?  No Discharge Facility: [facility]    Noted following upcoming appointments from discharge chart review:   [facility] follow up appointment(s):   Future Appointments   Date Time Provider Bari Johnson   10/4/2022  1:15 PM Kathy Ortega MD [facility] AMB   10/10/2022  9:00 AM Rajesh Morales MD [facility] AMB   10/28/2022  9:00 AM Rajesh Morales MD [facility] AMB     Non-[facility] follow up appointment(s): [facility]

## 2022-09-28 ENCOUNTER — CARE COORDINATION (OUTPATIENT)
Dept: CARE COORDINATION | Facility: CLINIC | Age: 82
End: 2022-09-28

## 2022-09-28 NOTE — CARE COORDINATION
[facility] Care Transitions Initial Follow Up Call    Call within 2 business days of discharge: Yes    LPN Care Coordinator contacted by patient by telephone to perform post hospital discharge assessment. Verified name and  with patient as identifiers. Provided introduction to self, and explanation of the LPN Care Coordinator role. Patient: Simi Bangura Patient : 1940   MRN: [id number]  Reason for Admission: Intractable pain, s/p CHOLECYSTECTOMY LAPAROSCOPIC  Discharge Date: 22 RARS: Readmission Risk Score: 12      Last Discharge  [facility]       Date Complaint Diagnosis Description Type Department Provider    22  Cholelithiasis without cholecystitis Admission (Discharged) Goldie Walker MD    22 Groin Pain Gall bladder stones . .. ED (TRANSFER) [facility] Rao, DO            Was this an external facility discharge? No Discharge Facility: [facility]    Challenges to be reviewed by the provider   Additional needs identified to be addressed with provider: No  none               Method of communication with provider: none. LPN Care Coordinator reviewed discharge instructions with patient who verbalized understanding. The patient was given an opportunity to ask questions and does not have any further questions or concerns at this time. Were discharge instructions available to patient? Yes. Reviewed appropriate site of care based on symptoms and resources available to patient including: PCP, Specialist. The patient agrees to contact the PCP office for questions related to their healthcare. Advance Care Planning:   Does patient have an Advance Directive: reviewed and current. Medication review of dischare medication was performed with patient, who verbalizes understanding of administration of home medications.  Medications reviewed, 1111F entered: N/A    Was patient discharged with a pulse oximeter?  na    Non-face-to-face services provided:  Obtained and reviewed discharge summary and/or continuity of care documents    Offered patient enrollment in the Remote Patient Monitoring (RPM) program for in-home monitoring: NA.    Care Transitions 24 Hour Call    Schedule Follow Up Appointment with PCP: Completed  Do you have a copy of your discharge instructions?: Yes  Do you have all of your prescriptions and are they filled?: Yes  Have you been contacted by a [facility] Pharmacist?: No  Have you scheduled your follow up appointment?: Yes  How are you going to get to your appointment?: Car - drive self  Do you have support at home?: Alone  Do you feel like you have everything you need to keep you well at home?: Yes  Care Transitions Interventions  No Identified Needs         Follow Up  Future Appointments   Date Time Provider Bari Johnson   10/4/2022  1:15 PM Selina Woodson MD [facility] AMB   10/10/2022  9:00 AM Maine Parish MD [facility] AMB   10/28/2022  9:00 AM Maine Parish MD PRE [facility] Care Coordinator provided contact information. No further follow-up call indicated based on severity of symptoms and risk factors.   Plan for next call:  graduated from [employer] [employer], N

## 2022-10-05 ENCOUNTER — OFFICE VISIT (OUTPATIENT)
Dept: SURGERY | Age: 82
End: 2022-10-05

## 2022-10-05 VITALS — HEART RATE: 67 BPM | OXYGEN SATURATION: 100 % | HEIGHT: 67 IN | BODY MASS INDEX: 20.72 KG/M2 | WEIGHT: 132 LBS

## 2022-10-05 DIAGNOSIS — K80.12 CALCULUS OF GALLBLADDER WITH ACUTE ON CHRONIC CHOLECYSTITIS WITHOUT OBSTRUCTION: Primary | ICD-10-CM

## 2022-10-05 NOTE — PROGRESS NOTES
[address], [facility]  [phone]    Office Note/Consult Note   Melinda Phelan   MRN: [id number]     : 1940        HPI: Melinda Phelan is a 81 y.o. female who is seen at request of Dr. Sarah Sutton for intractable pain from cholelithiasis. She has had years of symptoms that could have been biliary colic vs. IBS vs. Chronic constipation. She has h/o endometriosis. She has had CT imaging which did/does not show her gallstones that are well seen and fill her GB on US. Her GB is enlarged with a thin wall. She has several medical issues, but no prior abdominal surgeries. She is currently without pain, but would like to proceed with cholecystectomy. I have personally reviewed images including previous CT which did not describe the enlarged GB present. It is only slightly smaller than current scan.    22 CHOLECYSTECTOMY LAPAROSCOPIC WITH ICG CHOLANGIOGRAPHY    21 POD1: \"thirsty\" with abdominal pain as expected. VSS/AF. Abd: soft, midline and trochar site dressings CDI. Tenderness with exam.   WBC 15, Hgb 11.2, Cr 0.8    21 POD3: Pt sitting up in bed. C/o abdominal pain. Tried Full liquids yesterday but felt bloated. Concerned about not having a BM. States relies on mag citrate at home. Reports +flatus/-BM. AF, NAD. WBC 6.4, Hgb 10.2.    21 POD4: Pt standing  up in room stretching legs. Feeling better today. Has been tolerating Clear Liquids. No nausea or vomiting. Reports +flatus/+BM. AF, NAD. WBC 4.5, Hgb 9.8.    21 POD5: Pt in bed. Requiring narcotics for pain overnight. Has been tolerating Clear Liquids and FLD yesterday with BM/diarrhea. No nausea or vomiting. Reports +flatus/+BM. AF, NAD. ABD: resolving ecchymosis, soft & flat, TTP as expected. ADDITIONAL HISTORY  10/5/22: Feels good. Occas constipation that she will tx w prune juice.   Abdominal tenderness persists, using tylenol TID, trying to wean. AF, no N/V    Past Medical History:   Diagnosis Date    Constipation     Duodenal ulcer     Endometriosis      Past Surgical History:   Procedure Laterality Date    CERVICAL LAMINECTOMY  1998    CHOLECYSTECTOMY, LAPAROSCOPIC N/A 9/21/2022    CHOLECYSTECTOMY LAPAROSCOPIC performed by Mikey Lambert MD at [facility] MAIN OR    TONSILLECTOMY      TUBAL LIGATION       Current Outpatient Medications   Medication Sig    ondansetron (ZOFRAN-ODT) 4 MG disintegrating tablet Take 1 tablet by mouth every 8 hours as needed for Nausea or Vomiting    docusate sodium (COLACE, DULCOLAX) 100 MG CAPS Take 100 mg by mouth daily    pantoprazole (PROTONIX) 40 MG tablet Take 1 tablet by mouth every morning (before breakfast)    polyethylene glycol (GLYCOLAX) 17 g packet Take by mouth as needed    Magnesium Citrate 100 MG CAPS Take by mouth    DIPHENHYDRAMINE HCL PO Take by mouth    FEXOFENADINE HCL PO Take by mouth    Multiple Vitamins-Minerals (MULTIVITAMIN ADULTS PO) Take 1 tablet by mouth daily    Probiotic Product (PROBIOTIC BLEND PO) Strength: ; Form: ; SIG: take 1 tab  daily    sucralfate (CARAFATE) 1 GM tablet TAKE 1 TABLET BY MOUTH FOUR TIMES DAILY     No current facility-administered medications for this visit. ALLERGIES:  Sulfamethoxazole-trimethoprim and Oxycodone-acetaminophen    Social History     Socioeconomic History    Marital status:    Tobacco Use    Smoking status: Never    Smokeless tobacco: Never   Substance and Sexual Activity    Alcohol use: No    Drug use: No     Social History     Tobacco Use   Smoking Status Never   Smokeless Tobacco Never     Family History   Problem Relation Age of Onset    Diabetes Brother     Diabetes Father     Heart Disease Mother     Heart Disease Brother        ROS: The patient has no difficulty with chest pain or shortness of breath. No fever or chills. The patient denies any personal or family history of abnormal clotting or bleeding. Comprehensive review of systems was otherwise unremarkable except as noted above. Physical Exam:   Constitutional: Alert oriented cooperative patient in no acute distress. There were no vitals taken for this visit. Eyes: Sclera are clear without icterus. ENMT: no obvious neck masses, no ear or lip lesions  CV: RRR. Normal perfusion  Resp: No JVD. Breathing is  non-labored. GI: thin, soft and non distended. Appropriately ttp with midline glue peeling. Resolving ecchymosis. Musculoskeletal: unremarkable with normal function. Neuro:  No obvious focal deficits  Psychiatric: normal affect and mood, no memory impairment    Lab Results   Component Value Date/Time    WBC 4.2 09/26/2022 04:29 AM    HGB 9.7 09/26/2022 04:29 AM    HCT 29.8 09/26/2022 04:29 AM     09/26/2022 04:29 AM    MCV 96.4 09/26/2022 04:29 AM       Lab Results   Component Value Date     09/26/2022    K 3.7 09/26/2022     09/26/2022    CO2 28 09/26/2022    BUN 8 09/26/2022    CREATININE 0.50 (L) 09/26/2022    GLUCOSE 100 09/26/2022    CALCIUM 8.5 09/26/2022    PROT 6.9 09/20/2022    LABALBU 4.6 09/20/2022    BILITOT 0.5 09/20/2022    ALKPHOS 105 09/20/2022    AST 28 09/20/2022    ALT 28 09/20/2022    LABGLOM >60 09/26/2022    GFRAA >60 09/26/2022    AGRATIO 0.9 (L) 03/17/2022    GLOB 2.3 09/20/2022         CT Result (most recent):  CT ABDOMEN PELVIS W IV CONTRAST 09/20/2022    Narrative  CT OF THE ABDOMEN AND PELVIS WITH CONTRAST. CLINICAL INDICATION: Right lower quadrant abdominal pain    PROCEDURE: Serial thin section axial images obtained from the lung bases through  the proximal femurs following the administration of 100 cc of Isovue 370  intravenous contrast.  Radiation dose reduction techniques were used for this  study.  Our CT scanners use one or all of the following: Automated exposure  control, adjusted of the mA and/or kV according to patient size, iterative  reconstruction    COMPARISON: CT abdomen and pelvis dated 8/21/2020    FINDINGS:    CT ABDOMEN: The gallbladder is very distended with a small amount of either  dependent sludge or small gallstones. The gallbladder wall is thin. No  pericholecystic inflammatory fat stranding. The appendix is normal. The bowel is  normal in course, caliber, and wall thickness. The stomach is decompressed. The  pancreas is normal. The liver and spleen are unremarkable. The kidneys are  symmetric in size and contrast enhancement. There is no hydronephrosis. The  adrenal glands are normal. There is no ascites or adenopathy. CT PELVIS: The bladder is very distended with smooth thin walls. The uterus and  adnexa are unremarkable. The rectum is normal. No free fluid accumulating  filling the pelvis. There is no inguinal hernia. No aggressive osseous lesions identified. Impression  1. Hydropic gallbladder with thin walls. Dependent sludge or small stones noted  in the gallbladder fundus. If there is clinical concern for cholecystitis,  consider further evaluation with right upper quadrant abdominal ultrasound. 2. Otherwise no acute abdominal or pelvic abnormality. The appendix is normal.      US Result (most recent):  US ABDOMEN LIMITED 09/20/2022    Narrative  Right upper quadrant abdominal ultrasound. CLINICAL INDICATION: Cholelithiasis    PROCEDURE: Realtime grayscale and color Doppler evaluation of the right upper  abdominal quadrant. COMPARISON: CT of the abdomen and pelvis dated 9/20/2022    FINDINGS: The liver is normal in size and echogenicity without focal lesion the  gallbladder is distended and near completely full of gallstones. The gallbladder  wall is thin at 2 mm. A negative sonographic [place] sign is reported. The common  bile duct measures 3.7 mm. The imaged portion the pancreas is unremarkable. The  right kidney is normal in size measuring 10.8 cm. There is no hydronephrosis.   Echogenicity is normal. The aorta and IVC are patent and unremarkable. Impression  Distended gallbladder with cholelithiasis. The gallbladder is near  completely filled with stones. Assessment/Plan:     Nika Dye is a 81 y.o. female who presents with recurrent abdominal pain and cholelithiasis with very enlarged GB. Hopefully symptoms are related to GB and stones as pathology. Unlikely that GB is functioning and will proceed with cholecystectomy. She has no jaundice. Strong possibility that procedure will convert to open with size limitation of GB. We discussed proceeding with laparoscopic, possible open cholecystectomy. We will give ICG for immunofluorescent cholangiography. I discussed the patient's condition and treatment options with the patient. I discussed risks of surgery in language the patient could understand including bleeding, infection, need for further surgery, abscess, fistula, SBO, DVT, PE, heart attack, stroke, renal failure, respiratory failure, ventilatory dependence, and death. The patient voiced understanding of all this and all questions were answered. Alternatives to surgery were discussed also and risks of the alternatives. The patient requested that we proceed with surgery. Informed consent was obtained. Cholelithiasis with chronic cholecystitis  S/P 9/21/22 CHOLECYSTECTOMY LAPAROSCOPIC WITH ICG CHOLANGIOGRAPHY  >doing well  >advance activity as tolerated  >pathology and copy of labs provided.   Follow up here prn      Patient Active Problem List    Diagnosis Date Noted    Cholelithiasis without cholecystitis 09/25/2022     Priority: Medium    Calculus of gallbladder with acute on chronic cholecystitis without obstruction 09/21/2022     Priority: Medium    Intractable pain 09/20/2022     Priority: Medium    Syncope 03/17/2022    SOB (shortness of breath) 03/17/2022    Acute gastric ulcer without hemorrhage or perforation 04/16/2021    Stage 3a chronic kidney disease ([facility] [address] 04/16/2021    Pain of right sacroiliac joint 01/05/2021    Right hip pain 01/05/2021    Grief 01/05/2021    Irritable bowel syndrome with both constipation and diarrhea 01/05/2021    Right lower quadrant abdominal pain 08/17/2020    Endometriosis 08/17/2020    Vitamin D deficiency 07/23/2020    Caregiver with fatigue 07/23/2020    Dental caries 07/23/2020    Flu-like symptoms 02/10/2020    Acute non-recurrent maxillary sinusitis 02/10/2020    Non-recurrent acute serous otitis media of both ears 02/10/2020    Osteopenia of neck of left femur 08/23/2019     T -2.3 August 2019  Repeat 2021        Paronychia of finger of right hand 08/30/2018    Sciatica of right side 04/03/2018    Raynaud's disease without gangrene 04/03/2018    Chronic neck pain 04/03/2018    Encounter for monitoring chronic NSAID therapy 04/03/2018    Chronic fatigue 04/03/2018    Exposure to varicella zoster virus (VZV) 04/03/2018        Genelle Nyhan, APRN - CNP

## 2022-10-07 ENCOUNTER — TELEPHONE (OUTPATIENT)
Dept: FAMILY MEDICINE CLINIC | Facility: CLINIC | Age: 82
End: 2022-10-07

## 2022-10-07 NOTE — TELEPHONE ENCOUNTER
Patient called in the call center for dizziness and light headedness. BP was at home was 105/71. Patient feels like she is fixing to pass out but she doesn't. Patient does have an appointment on Monday but would like to get it taken care today. Encourage patient to go to urgent if she wants to get looked at today, due to no appointments being open at the office or if she feels like she can wait until Monday when she sees dr. Annabel Patel.

## 2022-10-10 ENCOUNTER — OFFICE VISIT (OUTPATIENT)
Dept: FAMILY MEDICINE CLINIC | Facility: CLINIC | Age: 82
End: 2022-10-10
Payer: MEDICARE

## 2022-10-10 VITALS
DIASTOLIC BLOOD PRESSURE: 52 MMHG | BODY MASS INDEX: 20.4 KG/M2 | HEIGHT: 67 IN | WEIGHT: 130 LBS | SYSTOLIC BLOOD PRESSURE: 122 MMHG | HEART RATE: 59 BPM

## 2022-10-10 DIAGNOSIS — K21.9 GASTROESOPHAGEAL REFLUX DISEASE WITHOUT ESOPHAGITIS: ICD-10-CM

## 2022-10-10 DIAGNOSIS — R42 LIGHT HEADED: Primary | ICD-10-CM

## 2022-10-10 DIAGNOSIS — I95.9 HYPOTENSION, UNSPECIFIED HYPOTENSION TYPE: ICD-10-CM

## 2022-10-10 DIAGNOSIS — D64.9 ANEMIA, UNSPECIFIED TYPE: ICD-10-CM

## 2022-10-10 DIAGNOSIS — R42 LIGHT HEADED: ICD-10-CM

## 2022-10-10 DIAGNOSIS — I95.9 SYMPTOMATIC HYPOTENSION: Primary | ICD-10-CM

## 2022-10-10 DIAGNOSIS — H65.91 MIDDLE EAR EFFUSION, RIGHT: ICD-10-CM

## 2022-10-10 LAB
ALBUMIN SERPL-MCNC: 4.1 G/DL (ref 3.2–4.6)
ALBUMIN/GLOB SERPL: 1.5 {RATIO} (ref 1.2–3.5)
ALP SERPL-CCNC: 108 U/L (ref 50–136)
ALT SERPL-CCNC: 36 U/L (ref 12–65)
ANION GAP SERPL CALC-SCNC: 6 MMOL/L (ref 4–13)
AST SERPL-CCNC: 15 U/L (ref 15–37)
BASOPHILS # BLD: 0.1 K/UL (ref 0–0.2)
BASOPHILS NFR BLD: 1 % (ref 0–2)
BILIRUB SERPL-MCNC: 0.4 MG/DL (ref 0.2–1.1)
BUN SERPL-MCNC: 28 MG/DL (ref 8–23)
CALCIUM SERPL-MCNC: 9.2 MG/DL (ref 8.3–10.4)
CHLORIDE SERPL-SCNC: 101 MMOL/L (ref 101–110)
CO2 SERPL-SCNC: 28 MMOL/L (ref 21–32)
CORTIS AM PEAK SERPL-MCNC: 13.1 UG/DL (ref 7–25)
CREAT SERPL-MCNC: 0.9 MG/DL (ref 0.6–1)
DIFFERENTIAL METHOD BLD: ABNORMAL
EOSINOPHIL # BLD: 0.1 K/UL (ref 0–0.8)
EOSINOPHIL NFR BLD: 2 % (ref 0.5–7.8)
ERYTHROCYTE [DISTWIDTH] IN BLOOD BY AUTOMATED COUNT: 13.7 % (ref 11.9–14.6)
FERRITIN SERPL-MCNC: 131 NG/ML (ref 8–388)
GLOBULIN SER CALC-MCNC: 2.8 G/DL (ref 2.3–3.5)
GLUCOSE SERPL-MCNC: 70 MG/DL (ref 65–100)
HCT VFR BLD AUTO: 38.7 % (ref 35.8–46.3)
HGB BLD-MCNC: 12.2 G/DL (ref 11.7–15.4)
IMM GRANULOCYTES # BLD AUTO: 0 K/UL (ref 0–0.5)
IMM GRANULOCYTES NFR BLD AUTO: 0 % (ref 0–5)
IRON SERPL-MCNC: 100 UG/DL (ref 35–150)
LYMPHOCYTES # BLD: 1.8 K/UL (ref 0.5–4.6)
LYMPHOCYTES NFR BLD: 25 % (ref 13–44)
MCH RBC QN AUTO: 31.5 PG (ref 26.1–32.9)
MCHC RBC AUTO-ENTMCNC: 31.5 G/DL (ref 31.4–35)
MCV RBC AUTO: 100 FL (ref 79.6–97.8)
MONOCYTES # BLD: 1 K/UL (ref 0.1–1.3)
MONOCYTES NFR BLD: 15 % (ref 4–12)
NEUTS SEG # BLD: 3.9 K/UL (ref 1.7–8.2)
NEUTS SEG NFR BLD: 57 % (ref 43–78)
NRBC # BLD: 0 K/UL (ref 0–0.2)
PLATELET # BLD AUTO: 416 K/UL (ref 150–450)
PMV BLD AUTO: 9.7 FL (ref 9.4–12.3)
POTASSIUM SERPL-SCNC: 5 MMOL/L (ref 3.5–5.1)
PROT SERPL-MCNC: 6.9 G/DL (ref 6.3–8.2)
RBC # BLD AUTO: 3.87 M/UL (ref 4.05–5.2)
SODIUM SERPL-SCNC: 135 MMOL/L (ref 136–145)
TIBC SERPL-MCNC: 368 UG/DL (ref 250–450)
TSH, 3RD GENERATION: 2.31 UIU/ML (ref 0.36–3.74)
WBC # BLD AUTO: 6.9 K/UL (ref 4.3–11.1)

## 2022-10-10 PROCEDURE — 1123F ACP DISCUSS/DSCN MKR DOCD: CPT | Performed by: FAMILY MEDICINE

## 2022-10-10 PROCEDURE — 99214 OFFICE O/P EST MOD 30 MIN: CPT | Performed by: FAMILY MEDICINE

## 2022-10-10 RX ORDER — SUCRALFATE 1 G/1
1 TABLET ORAL 4 TIMES DAILY
Qty: 120 TABLET | Refills: 2 | Status: SHIPPED | OUTPATIENT
Start: 2022-10-10

## 2022-10-10 ASSESSMENT — ENCOUNTER SYMPTOMS
ABDOMINAL PAIN: 0
BLOOD IN STOOL: 0
CHEST TIGHTNESS: 0
SHORTNESS OF BREATH: 0

## 2022-10-10 NOTE — PROGRESS NOTES
[place]  _______________________________________  MD Raghu Arndt, DO  Hugo Lopez, MD Kt Hernández MD    [address] , [address] [place] [address] Avenue  Phone: [phone]  Fax: [phone]    Hakan Arvizu (:  1940) is a 81 y.o. female,Established patient, here for evaluation of the following chief complaint(s):  Follow-Up from Hospital and Otalgia (R ear )         ASSESSMENT/PLAN:    1. Light headed  Check labs to make sure she's not low on anything. Ddx includes anemia, low cortisol, low thyoid. See below. - Comprehensive Metabolic Panel; Future  - CBC with Auto Differential; Future    2. Anemia, unspecified type  [place] out what knid of anemia she has. - CBC with Auto Differential; Future  - Iron; Future  - Ferritin; Future  - Total Iron Binding Capacity; Future    3. Gastroesophageal reflux disease without esophagitis  Stable, continue current regimen. - sucralfate (CARAFATE) 1 GM tablet; Take 1 tablet by mouth 4 times daily TAKE 1 TABLET BY MOUTH FOUR TIMES DAILY  Dispense: 120 tablet; Refill: 2    4. Hypotension, unspecified hypotension type  If all labs normal, consider cards referral.   - TSH; Future  - Cortisol AM, Total; Future    5. Middle ear effusion, right  Looks allergic. Start flonase. Recheck in 4w    Subjective   SUBJECTIVE/OBJECTIVE:  Here for FU after having emergent lap vani in 2022. Was doing well post op but on 10/7 called here with dizziness and presyncopal symptoms. Was advised to go to  as I was out of the office but decided to wait until today. She is also c/o pain in one ear. She has some borderline orthostatic hypotension with her low DBP falling 6 points from supine to standing, SBP fell 13 points.      Wt Readings from Last 3 Encounters:   10/10/22 130 lb (59 kg)   10/05/22 132 lb (59.9 kg)   22 132 lb (59.9 kg)     BP Readings from Last 3 Encounters:   10/10/22 (!) 135/58 09/26/22 (!) 124/58   09/20/22 (!) 109/48     She takes no meds for BP. She is on PPI and carafate for bad GERD. Also treated with mag citrate and colace for chronic constipation with miralax PRN as well. Lab Results   Component Value Date    WBC 4.2 (L) 09/26/2022    HGB 9.7 (L) 09/26/2022    HCT 29.8 (L) 09/26/2022    MCV 96.4 09/26/2022     09/26/2022     Lab Results   Component Value Date/Time     09/26/2022 04:29 AM    K 3.7 09/26/2022 04:29 AM     09/26/2022 04:29 AM    CO2 28 09/26/2022 04:29 AM    BUN 8 09/26/2022 04:29 AM    CREATININE 0.50 09/26/2022 04:29 AM    GLUCOSE 100 09/26/2022 04:29 AM    CALCIUM 8.5 09/26/2022 04:29 AM    LABGLOM >60 09/26/2022 04:29 AM      Lab Results   Component Value Date    ALT 28 09/20/2022    AST 28 09/20/2022    ALKPHOS 105 09/20/2022    BILITOT 0.5 09/20/2022       Review of Systems   Constitutional:  Negative for chills and fever. Respiratory:  Negative for chest tightness and shortness of breath. Cardiovascular:  Negative for chest pain. Gastrointestinal:  Negative for abdominal pain and blood in stool. Genitourinary:  Negative for hematuria. Neurological:  Positive for dizziness and light-headedness. Negative for syncope. Objective   Physical Exam  Vitals and nursing note reviewed. Constitutional:       Appearance: Normal appearance. HENT:      Head: Normocephalic and atraumatic. Right Ear: External ear normal.      Left Ear: External ear normal.      Ears:      Comments: There is clear fluid in the BL Tms without sediment     Mouth/Throat:      Mouth: Mucous membranes are moist.   Eyes:      General: No scleral icterus. Extraocular Movements: Extraocular movements intact. Pupils: Pupils are equal, round, and reactive to light. Cardiovascular:      Rate and Rhythm: Normal rate and regular rhythm. Pulses: Normal pulses. Heart sounds: No murmur heard. No friction rub. No gallop.    Pulmonary: Effort: Pulmonary effort is normal. No respiratory distress. Breath sounds: Normal breath sounds. No stridor. No wheezing. Abdominal:      General: Bowel sounds are normal. There is no distension. Tenderness: There is no abdominal tenderness. There is no right CVA tenderness or left CVA tenderness. Comments: Vertical umbilical incision and 2 trochar incisions C/D/I   Musculoskeletal:         General: No swelling or tenderness. Normal range of motion. Cervical back: Normal range of motion. No rigidity. Right lower leg: No edema. Left lower leg: No edema. Skin:     General: Skin is warm and dry. Coloration: Skin is pale. Neurological:      General: No focal deficit present. Mental Status: She is alert and oriented to person, place, and time. Mental status is at baseline. Cranial Nerves: No cranial nerve deficit. Deep Tendon Reflexes: Reflexes normal.   Psychiatric:         Mood and Affect: Mood normal.         Behavior: Behavior normal.         Thought Content: Thought content normal.         Judgment: Judgment normal.                An electronic signature was used to authenticate this note.     --Delfina Haro MD

## 2022-10-12 NOTE — TELEPHONE ENCOUNTER
Patient called stating she was supposed to get an Rx for Zofran when she was discharged but she never got one. Would like to know if a new RX could be sent in.

## 2022-10-13 RX ORDER — ONDANSETRON 4 MG/1
4 TABLET, FILM COATED ORAL DAILY PRN
Qty: 30 TABLET | Refills: 0 | Status: SHIPPED | OUTPATIENT
Start: 2022-10-13

## 2022-11-03 ENCOUNTER — INITIAL CONSULT (OUTPATIENT)
Dept: CARDIOLOGY CLINIC | Age: 82
End: 2022-11-03
Payer: MEDICARE

## 2022-11-03 VITALS
WEIGHT: 134 LBS | HEART RATE: 58 BPM | SYSTOLIC BLOOD PRESSURE: 100 MMHG | BODY MASS INDEX: 21.03 KG/M2 | HEIGHT: 67 IN | DIASTOLIC BLOOD PRESSURE: 62 MMHG

## 2022-11-03 DIAGNOSIS — I95.1 ORTHOSTATIC HYPOTENSION: ICD-10-CM

## 2022-11-03 DIAGNOSIS — R06.02 SOB (SHORTNESS OF BREATH): ICD-10-CM

## 2022-11-03 DIAGNOSIS — Z76.89 ENCOUNTER TO ESTABLISH CARE: Primary | ICD-10-CM

## 2022-11-03 DIAGNOSIS — N18.31 STAGE 3A CHRONIC KIDNEY DISEASE (HCC): ICD-10-CM

## 2022-11-03 PROCEDURE — 99204 OFFICE O/P NEW MOD 45 MIN: CPT | Performed by: INTERNAL MEDICINE

## 2022-11-03 PROCEDURE — 93000 ELECTROCARDIOGRAM COMPLETE: CPT | Performed by: INTERNAL MEDICINE

## 2022-11-03 PROCEDURE — 1123F ACP DISCUSS/DSCN MKR DOCD: CPT | Performed by: INTERNAL MEDICINE

## 2022-11-03 RX ORDER — POLYETHYLENE GLYCOL 3350 17 G/17G
17 POWDER, FOR SOLUTION ORAL DAILY PRN
COMMUNITY

## 2022-11-03 ASSESSMENT — ENCOUNTER SYMPTOMS
ABDOMINAL PAIN: 0
EYE REDNESS: 0
STRIDOR: 0
HEMATOCHEZIA: 0
DOUBLE VISION: 0
HOARSE VOICE: 0
HEMOPTYSIS: 0
HEMATEMESIS: 0
CONSTIPATION: 1
WHEEZING: 0

## 2022-11-03 NOTE — PROGRESS NOTES
history, Social History, and Medications were all reviewed with the patient today and updated as necessary.      Allergies   Allergen Reactions    Pneumococcal 13-Rosalva Conj Vacc Swelling    Sulfamethoxazole-Trimethoprim Hives    Trimethoprim     Wasp Venom Protein      Other reaction(s): Hives/Swelling-Allergy  Other reaction(s): Hives/Swelling-Allergy      Oxycodone-Acetaminophen Nausea And Vomiting     Patient Active Problem List   Diagnosis    Pain of right sacroiliac joint    Right hip pain    Right lower quadrant abdominal pain    Sciatica of right side    Vitamin D deficiency    Flu-like symptoms    Raynaud's disease without gangrene    Chronic neck pain    Caregiver with fatigue    Osteopenia of neck of left femur    Acute non-recurrent maxillary sinusitis    Grief    Paronychia of finger of right hand    Irritable bowel syndrome with both constipation and diarrhea    Acute gastric ulcer without hemorrhage or perforation    Stage 3a chronic kidney disease (HCC)    Non-recurrent acute serous otitis media of both ears    Dental caries    Encounter for monitoring chronic NSAID therapy    Endometriosis    Chronic fatigue    Exposure to varicella zoster virus (VZV)    Syncope    SOB (shortness of breath)    Intractable pain    Calculus of gallbladder with acute on chronic cholecystitis without obstruction    Cholelithiasis without cholecystitis    Light headed    Anemia    Gastroesophageal reflux disease without esophagitis    Hypotension    Middle ear effusion, right     Past Medical History:   Diagnosis Date    Constipation     Duodenal ulcer     Endometriosis      Past Surgical History:   Procedure Laterality Date    CERVICAL LAMINECTOMY  1998    CHOLECYSTECTOMY, LAPAROSCOPIC N/A 9/21/2022    CHOLECYSTECTOMY LAPAROSCOPIC performed by Alyssa Mosley MD at [facility] MAIN OR    TONSILLECTOMY      TUBAL LIGATION       Family History   Problem Relation Age of Onset    Diabetes Brother     Diabetes Father     Heart Disease Mother     Heart Disease Brother      Social History     Tobacco Use    Smoking status: Never    Smokeless tobacco: Never   Substance Use Topics    Alcohol use: No     Current Outpatient Medications   Medication Sig Dispense Refill    Multiple Vitamins-Minerals (PRESERVISION AREDS 2 PO) Take by mouth      cyanocobalamin 100 MCG tablet Take 100 mcg by mouth daily      vitamin D-3 (CHOLECALCIFEROL) 125 MCG (5000 UT) TABS Take 5,000 Units by mouth daily      ondansetron (ZOFRAN) 4 MG tablet Take 1 tablet by mouth daily as needed for Nausea or Vomiting 30 tablet 0    sucralfate (CARAFATE) 1 GM tablet Take 1 tablet by mouth 4 times daily TAKE 1 TABLET BY MOUTH FOUR TIMES DAILY 120 tablet 2    pantoprazole (PROTONIX) 40 MG tablet Take 1 tablet by mouth every morning (before breakfast) 30 tablet 3    polyethylene glycol (GLYCOLAX) 17 g packet Take by mouth as needed      Magnesium Citrate 100 MG CAPS Take by mouth      Multiple Vitamins-Minerals (MULTIVITAMIN ADULTS PO) Take 1 tablet by mouth daily      Probiotic Product (PROBIOTIC BLEND PO) Strength: ; Form: ; SIG: take 1 tab  daily      polyethylene glycol (GLYCOLAX) 17 g packet Take 17 g by mouth daily as needed       No current facility-administered medications for this visit. Review of Systems   Constitutional: Negative for chills and fever. HENT:  Negative for ear discharge, hoarse voice and stridor. Eyes:  Negative for double vision and redness. Cardiovascular:  Negative for cyanosis and syncope. Respiratory:  Negative for hemoptysis and wheezing. Endocrine: Negative for polydipsia and polyphagia. Hematologic/Lymphatic: Negative for adenopathy. Skin:  Negative for itching and rash. Musculoskeletal:  Negative for joint swelling and muscle weakness. Gastrointestinal:  Positive for constipation. Negative for abdominal pain, hematemesis and hematochezia. Genitourinary:  Negative for flank pain and nocturia.    Neurological:  Positive for light-headedness. Negative for focal weakness and seizures. Psychiatric/Behavioral:  Negative for altered mental status and suicidal ideas. Allergic/Immunologic: Negative for hives. PHYSICAL EXAM:    /62   Pulse 58   Ht 5' 7\" (1.702 m)   Wt 134 lb (60.8 kg)   BMI 20.99 kg/m²      Physical Exam    General: Alert and oriented in no acute distress  HEENT: Head is normocephalic, atraumatic, pupils are equal bilaterally, throat appears to be clear  Neck: No significant jugular venous distention no cervical bruits  Cardiovascular: S1 and S2 heard, regular rate and rhythm, no significant murmurs rubs or gallops. Respiratory: Clear to auscultation bilaterally with no adventitious sounds, respirations are normal  Abdomen: Soft, nontender, nondistended, bowel sounds present. Extremities: No cyanosis clubbing or edema  Peripheral pulses: Bilateral radial artery pulses are palpated. Bilateral pedal pulses are well felt. Neuro: No facial droop and no gross focal motor deficits  Lymphatic: No significant cervical lymphadenopathy noted. Musculoskeletal: No significant redness or swelling noted in all exposed joints. Skin: No significant rashes noted the of the exposed regions. Medical problems and test results were reviewed with the patient today.      Recent Results (from the past 672 hour(s))   TSH    Collection Time: 10/10/22  9:40 AM   Result Value Ref Range    TSH, 3RD GENERATION 2.310 0.358 - 3.740 uIU/mL   Total Iron Binding Capacity    Collection Time: 10/10/22  9:40 AM   Result Value Ref Range    TIBC 368 250 - 450 ug/dL   Ferritin    Collection Time: 10/10/22  9:40 AM   Result Value Ref Range    Ferritin 131 8 - 388 NG/ML   Iron    Collection Time: 10/10/22  9:40 AM   Result Value Ref Range    Iron 100 35 - 150 ug/dL   CBC with Auto Differential    Collection Time: 10/10/22  9:40 AM   Result Value Ref Range    WBC 6.9 4.3 - 11.1 K/uL    RBC 3.87 (L) 4.05 - 5.2 M/uL    Hemoglobin 12.2 11.7 - 15.4 g/dL    Hematocrit 38.7 35.8 - 46.3 %    .0 (H) 79.6 - 97.8 FL    MCH 31.5 26.1 - 32.9 PG    MCHC 31.5 31.4 - 35.0 g/dL    RDW 13.7 11.9 - 14.6 %    Platelets 749 905 - 279 K/uL    MPV 9.7 9.4 - 12.3 FL    nRBC 0.00 0.0 - 0.2 K/uL    Differential Type AUTOMATED      Seg Neutrophils 57 43 - 78 %    Lymphocytes 25 13 - 44 %    Monocytes 15 (H) 4.0 - 12.0 %    Eosinophils % 2 0.5 - 7.8 %    Basophils 1 0.0 - 2.0 %    Immature Granulocytes 0 0.0 - 5.0 %    Segs Absolute 3.9 1.7 - 8.2 K/UL    Absolute Lymph # 1.8 0.5 - 4.6 K/UL    Absolute Mono # 1.0 0.1 - 1.3 K/UL    Absolute Eos # 0.1 0.0 - 0.8 K/UL    Basophils Absolute 0.1 0.0 - 0.2 K/UL    Absolute Immature Granulocyte 0.0 0.0 - 0.5 K/UL   Comprehensive Metabolic Panel    Collection Time: 10/10/22  9:40 AM   Result Value Ref Range    Sodium 135 (L) 136 - 145 mmol/L    Potassium 5.0 3.5 - 5.1 mmol/L    Chloride 101 101 - 110 mmol/L    CO2 28 21 - 32 mmol/L    Anion Gap 6 4 - 13 mmol/L    Glucose 70 65 - 100 mg/dL    BUN 28 (H) 8 - 23 MG/DL    Creatinine 0.90 0.6 - 1.0 MG/DL    Est, Glom Filt Rate >60 >60 ml/min/1.73m2    Calcium 9.2 8.3 - 10.4 MG/DL    Total Bilirubin 0.4 0.2 - 1.1 MG/DL    ALT 36 12 - 65 U/L    AST 15 15 - 37 U/L    Alk Phosphatase 108 50 - 136 U/L    Total Protein 6.9 6.3 - 8.2 g/dL    Albumin 4.1 3.2 - 4.6 g/dL    Globulin 2.8 2.3 - 3.5 g/dL    Albumin/Globulin Ratio 1.5 1.2 - 3.5     Cortisol AM, Total    Collection Time: 10/10/22  9:40 AM   Result Value Ref Range    Cortisol - AM 13.1 7 - 25 ug/dL     No results found for: CHOL, CHOLPOCT, CHOLX, CHLST, CHOLV, HDL, HDLPOC, HDLC, LDL, LDLC, VLDLC, VLDL, TGLX, TRIGL,hemoglobin, basic metabolic panel,   Lab Results   Component Value Date/Time    TSH 2.340 07/23/2020 11:10 AM    ,  Lab Results   Component Value Date/Time     10/10/2022 09:40 AM    K 5.0 10/10/2022 09:40 AM     10/10/2022 09:40 AM    CO2 28 10/10/2022 09:40 AM    BUN 28 10/10/2022 09:40 AM    GFRAA >60 09/26/2022 04:29 AM    GLOB 2.8 10/10/2022 09:40 AM    ALT 36 10/10/2022 09:40 AM    AST 15 10/10/2022 09:40 AM      No results found for: LDLCALC, LDLCHOLESTEROL, LDLDIRECT   Lab Results   Component Value Date    CREATININE 0.90 10/10/2022      No results found for this or any previous visit. Lab Results   Component Value Date    HGB 12.2 10/10/2022      Lab Results   Component Value Date     10/10/2022      EKG shows sinus bradycardia with incomplete right bundle branch block, left atrial enlargement    ASSESSMENT and PLAN    Hypotension  -     EKG 12 lead  -Likely associated with weight loss, postoperative opioid meds and with low baseline blood pressures. Has since resolved. All baseline lab work was normal including TSH, electrolytes, cortisol level and hemoglobin also has since improved. -No strong reason to treat this. Echo from earlier this year with preserved LV function and no obvious pericardial effusion. No strong reason to recheck this in the absence of any significant JVD or pulses paradoxus.  -She had some transient lightheadedness but this is gone away completely.  -I have asked her to liberalize salt and fluid intake. Stage 3a chronic kidney disease ([address]  --Counseled on importance of remaining well-hydrated especially with her ongoing constipation issues. SOB (shortness of breath)   -She said this was transient and immediately after surgery but now since her anemia has resolved, she no longer complains of this. She will let us know if it really becomes a recurrent issue. Overall Impression  -I made no changes with her medical therapy. Dr. Magdi Nascimento did an excellent job by working her up. Normal TSH, normal electrolytes, normal cortisol level and blood pressures are much better at this point after she is off opioid therapy completely. Return if symptoms worsen or fail to improve, for mitral regurg, acosta, syncope/presyncope.      Thank you Dr Magdi Nascimento for allowing us to participate in the care of your patient. If you have any further questions, please do not hesitate to contact us.   Sincerely,        Cristopher Benavides MD   11/3/2022

## 2022-11-11 ENCOUNTER — OFFICE VISIT (OUTPATIENT)
Dept: FAMILY MEDICINE CLINIC | Facility: CLINIC | Age: 82
End: 2022-11-11
Payer: MEDICARE

## 2022-11-11 VITALS
HEIGHT: 67 IN | BODY MASS INDEX: 21.03 KG/M2 | SYSTOLIC BLOOD PRESSURE: 112 MMHG | WEIGHT: 134 LBS | DIASTOLIC BLOOD PRESSURE: 60 MMHG

## 2022-11-11 DIAGNOSIS — Z23 ENCOUNTER FOR IMMUNIZATION: ICD-10-CM

## 2022-11-11 DIAGNOSIS — K59.09 CHRONIC CONSTIPATION: ICD-10-CM

## 2022-11-11 DIAGNOSIS — N18.31 CHRONIC KIDNEY DISEASE, STAGE 3A (HCC): ICD-10-CM

## 2022-11-11 DIAGNOSIS — I95.9 HYPOTENSION, UNSPECIFIED HYPOTENSION TYPE: Primary | ICD-10-CM

## 2022-11-11 PROCEDURE — 99214 OFFICE O/P EST MOD 30 MIN: CPT | Performed by: FAMILY MEDICINE

## 2022-11-11 PROCEDURE — 90694 VACC AIIV4 NO PRSRV 0.5ML IM: CPT | Performed by: FAMILY MEDICINE

## 2022-11-11 PROCEDURE — G0008 ADMIN INFLUENZA VIRUS VAC: HCPCS | Performed by: FAMILY MEDICINE

## 2022-11-11 PROCEDURE — 1123F ACP DISCUSS/DSCN MKR DOCD: CPT | Performed by: FAMILY MEDICINE

## 2022-11-11 ASSESSMENT — ENCOUNTER SYMPTOMS
BLOOD IN STOOL: 0
CHEST TIGHTNESS: 0
ABDOMINAL PAIN: 0
SHORTNESS OF BREATH: 0

## 2022-11-11 NOTE — PROGRESS NOTES
[facility]  _______________________________________  MD Aruna Shelley, RUBA Muñ[place]    [address], [address] [place] [address]  Phone: [phone]  Fax: [phone]    Riley Stevenson (:  1940) is a 81 y.o. female,Established patient, here for evaluation of the following chief complaint(s):  Follow-up (4 week follow up )         ASSESSMENT/PLAN:    1. Hypotension, unspecified hypotension type  Stable at this point. Continue liberalizing salt. Will monitor. 2. Chronic kidney disease, stage 3a (HCC)  Stable, trend GFR on FU. 3. Chronic constipation  Improved, continue current regimen. 4. Encounter for immunization    - Influenza, FLUAD, (age 72 y+), IM, Preservative Free, 0.5 mL      Recheck in 6m    Subjective   SUBJECTIVE/OBJECTIVE:      Labs were unremarkable after her visit last month, we sent her to cardiology for symptomatic hypotension. They felt with her normal labs and slow return to baseline there was no need for intervention. BP is better today in that her DBP is normal.     Wt Readings from Last 3 Encounters:   22 134 lb (60.8 kg)   22 134 lb (60.8 kg)   10/10/22 130 lb (59 kg)     BP Readings from Last 3 Encounters:   22 100/62   10/10/22 (!) 122/52   22 (!) 124/58     She takes no meds for BP. She is on PPI and carafate for bad GERD. Also treated with mag citrate and colace for chronic constipation with miralax PRN as well. Continues to have some constipation but after liberalizing salt that has helped.      Lab Results   Component Value Date    WBC 6.9 10/10/2022    HGB 12.2 10/10/2022    HCT 38.7 10/10/2022    .0 (H) 10/10/2022     10/10/2022     Lab Results   Component Value Date/Time     10/10/2022 09:40 AM    K 5.0 10/10/2022 09:40 AM     10/10/2022 09:40 AM    CO2 28 10/10/2022 09:40 AM    BUN 28 10/10/2022 09:40 AM CREATININE 0.90 10/10/2022 09:40 AM    GLUCOSE 70 10/10/2022 09:40 AM    CALCIUM 9.2 10/10/2022 09:40 AM    LABGLOM >60 10/10/2022 09:40 AM      Lab Results   Component Value Date    ALT 36 10/10/2022    AST 15 10/10/2022    ALKPHOS 108 10/10/2022    BILITOT 0.4 10/10/2022     HM: Flu shot:    Review of Systems   Constitutional:  Negative for chills and fever. Respiratory:  Negative for chest tightness and shortness of breath. Cardiovascular:  Negative for chest pain. Gastrointestinal:  Negative for abdominal pain and blood in stool. Genitourinary:  Negative for hematuria. Neurological:  Positive for dizziness and light-headedness. Negative for syncope. Objective   Physical Exam  Vitals and nursing note reviewed. Constitutional:       Appearance: Normal appearance. HENT:      Head: Normocephalic and atraumatic. Right Ear: External ear normal.      Left Ear: External ear normal.      Ears:      Comments: There is clear fluid in the BL Tms without sediment     Mouth/Throat:      Mouth: Mucous membranes are moist.   Eyes:      General: No scleral icterus. Extraocular Movements: Extraocular movements intact. Pupils: Pupils are equal, round, and reactive to light. Cardiovascular:      Rate and Rhythm: Normal rate and regular rhythm. Pulses: Normal pulses. Heart sounds: No murmur heard. No friction rub. No gallop. Pulmonary:      Effort: Pulmonary effort is normal. No respiratory distress. Breath sounds: Normal breath sounds. No stridor. No wheezing. Abdominal:      General: Bowel sounds are normal. There is no distension. Tenderness: There is no abdominal tenderness. There is no right CVA tenderness or left CVA tenderness. Comments: Vertical umbilical incision and 2 trochar incisions C/D/I   Musculoskeletal:         General: No swelling or tenderness. Normal range of motion. Cervical back: Normal range of motion. No rigidity.       Right lower leg: No edema. Left lower leg: No edema. Skin:     General: Skin is warm and dry. Coloration: Skin is pale. Neurological:      General: No focal deficit present. Mental Status: She is alert and oriented to person, place, and time. Mental status is at baseline. Cranial Nerves: No cranial nerve deficit. Deep Tendon Reflexes: Reflexes normal.   Psychiatric:         Mood and Affect: Mood normal.         Behavior: Behavior normal.         Thought Content: Thought content normal.         Judgment: Judgment normal.                An electronic signature was used to authenticate this note.     --Jenifer Mckeon MD

## 2022-12-15 ENCOUNTER — TELEPHONE (OUTPATIENT)
Dept: FAMILY MEDICINE CLINIC | Facility: CLINIC | Age: 82
End: 2022-12-15

## 2022-12-15 ENCOUNTER — OFFICE VISIT (OUTPATIENT)
Dept: FAMILY MEDICINE CLINIC | Facility: CLINIC | Age: 82
End: 2022-12-15
Payer: MEDICARE

## 2022-12-15 VITALS
BODY MASS INDEX: 20.56 KG/M2 | HEIGHT: 67 IN | WEIGHT: 131 LBS | TEMPERATURE: 98.2 F | DIASTOLIC BLOOD PRESSURE: 84 MMHG | HEART RATE: 60 BPM | SYSTOLIC BLOOD PRESSURE: 129 MMHG

## 2022-12-15 DIAGNOSIS — B96.89 BACTERIAL SKIN INFECTION: Primary | ICD-10-CM

## 2022-12-15 DIAGNOSIS — L08.9 BACTERIAL SKIN INFECTION: Primary | ICD-10-CM

## 2022-12-15 PROCEDURE — 1123F ACP DISCUSS/DSCN MKR DOCD: CPT | Performed by: NURSE PRACTITIONER

## 2022-12-15 PROCEDURE — 99213 OFFICE O/P EST LOW 20 MIN: CPT | Performed by: NURSE PRACTITIONER

## 2022-12-15 RX ORDER — CEPHALEXIN 500 MG/1
500 CAPSULE ORAL 3 TIMES DAILY
Qty: 21 CAPSULE | Refills: 0 | Status: SHIPPED | OUTPATIENT
Start: 2022-12-15 | End: 2022-12-22

## 2022-12-15 NOTE — TELEPHONE ENCOUNTER
Tetanus is UTD, if washing it once with peroxide and then using OTC triple abx ointment daily is not fixing it she would need a visit for an Rx.

## 2022-12-15 NOTE — TELEPHONE ENCOUNTER
----- Message from Kaylen Latisha sent at 12/15/2022  9:05 AM EST -----  Subject: Message to Provider    QUESTIONS  Information for Provider? Patient is requesting antibiotic to treat a cut   she has on her right index figure from a can. She is requesting the   medication to be sent to [address], [facility].   ---------------------------------------------------------------------------  --------------  Evelyne PACE  [phone]; OK to leave message on voicemail  ---------------------------------------------------------------------------  --------------  SCRIPT ANSWERS  Relationship to Patient?  Self

## 2022-12-15 NOTE — PROGRESS NOTES
Subjective:      Patient ID: Pepito Childress is a 81 y.o. female. Her for cutting her right index finger on a can last night. States it hurt badly and she almost blacked out but sat down on floor instead was able to get it to stop bleeding but is worried about infection started some left over keflex but needs more to finish a dose. The finger is no longer bleeding and she needs a new can opener  HPI    Review of Systems  Cut on finger throbbing at times  Objective:   Physical Exam  Oblique shallow cut on right index finger not bleeding looks clean  Assessment:      /84 (Site: Left Upper Arm, Position: Sitting, Cuff Size: Medium Adult)   Pulse 60   Temp 98.2 °F (36.8 °C) (Temporal)   Ht 5' 7\" (1.702 m)   Wt 131 lb (59.4 kg)   BMI 20.52 kg/m²        Plan:      1. Bacterial skin infection  -     cephALEXin (KEFLEX) 500 MG capsule; Take 1 capsule by mouth 3 times daily for 7 days, Disp-21 capsule, R-0Normal       Keep clean and dry antibiotic as ordered td is up to date 12. Failure to improve, or worsening symptoms return.    TYREE Bragg NP

## 2022-12-27 ENCOUNTER — TELEPHONE (OUTPATIENT)
Dept: FAMILY MEDICINE CLINIC | Facility: CLINIC | Age: 82
End: 2022-12-27

## 2022-12-27 DIAGNOSIS — B96.89 BACTERIAL SKIN INFECTION: Primary | ICD-10-CM

## 2022-12-27 DIAGNOSIS — L08.9 BACTERIAL SKIN INFECTION: Primary | ICD-10-CM

## 2022-12-27 RX ORDER — DOXYCYCLINE HYCLATE 100 MG
100 TABLET ORAL 2 TIMES DAILY
Qty: 20 TABLET | Refills: 0 | Status: SHIPPED | OUTPATIENT
Start: 2022-12-27 | End: 2023-01-06

## 2022-12-27 NOTE — TELEPHONE ENCOUNTER
Pt called and said her finger is draining yellow, not closing and hurting pretty bad. She said it feels like she cut some nerves in that finger and it must have been a little deeper than it looked on 12/15. She wanted another round of the antibiotic.

## 2023-05-11 ENCOUNTER — OFFICE VISIT (OUTPATIENT)
Dept: FAMILY MEDICINE CLINIC | Facility: CLINIC | Age: 83
End: 2023-05-11
Payer: MEDICARE

## 2023-05-11 VITALS
DIASTOLIC BLOOD PRESSURE: 60 MMHG | BODY MASS INDEX: 20.4 KG/M2 | HEIGHT: 67 IN | SYSTOLIC BLOOD PRESSURE: 120 MMHG | WEIGHT: 130 LBS

## 2023-05-11 DIAGNOSIS — I95.9 HYPOTENSION, UNSPECIFIED HYPOTENSION TYPE: Primary | ICD-10-CM

## 2023-05-11 DIAGNOSIS — M19.041 ARTHRITIS OF BOTH HANDS: ICD-10-CM

## 2023-05-11 DIAGNOSIS — I95.9 HYPOTENSION, UNSPECIFIED HYPOTENSION TYPE: ICD-10-CM

## 2023-05-11 DIAGNOSIS — K21.9 GASTROESOPHAGEAL REFLUX DISEASE WITHOUT ESOPHAGITIS: ICD-10-CM

## 2023-05-11 DIAGNOSIS — M19.042 ARTHRITIS OF BOTH HANDS: ICD-10-CM

## 2023-05-11 PROBLEM — N18.31 CHRONIC KIDNEY DISEASE, STAGE 3A (HCC): Status: RESOLVED | Noted: 2021-04-16 | Resolved: 2023-05-11

## 2023-05-11 LAB
BASOPHILS # BLD: 0 K/UL (ref 0–0.2)
BASOPHILS NFR BLD: 1 % (ref 0–2)
DIFFERENTIAL METHOD BLD: ABNORMAL
EOSINOPHIL # BLD: 0.1 K/UL (ref 0–0.8)
EOSINOPHIL NFR BLD: 2 % (ref 0.5–7.8)
ERYTHROCYTE [DISTWIDTH] IN BLOOD BY AUTOMATED COUNT: 13.6 % (ref 11.9–14.6)
HCT VFR BLD AUTO: 38.2 % (ref 35.8–46.3)
HGB BLD-MCNC: 12.4 G/DL (ref 11.7–15.4)
IMM GRANULOCYTES # BLD AUTO: 0 K/UL (ref 0–0.5)
IMM GRANULOCYTES NFR BLD AUTO: 0 % (ref 0–5)
LYMPHOCYTES # BLD: 1.6 K/UL (ref 0.5–4.6)
LYMPHOCYTES NFR BLD: 25 % (ref 13–44)
MCH RBC QN AUTO: 32.2 PG (ref 26.1–32.9)
MCHC RBC AUTO-ENTMCNC: 32.5 G/DL (ref 31.4–35)
MCV RBC AUTO: 99.2 FL (ref 82–102)
MONOCYTES # BLD: 0.9 K/UL (ref 0.1–1.3)
MONOCYTES NFR BLD: 15 % (ref 4–12)
NEUTS SEG # BLD: 3.6 K/UL (ref 1.7–8.2)
NEUTS SEG NFR BLD: 57 % (ref 43–78)
NRBC # BLD: 0 K/UL (ref 0–0.2)
PLATELET # BLD AUTO: 287 K/UL (ref 150–450)
PMV BLD AUTO: 9.7 FL (ref 9.4–12.3)
RBC # BLD AUTO: 3.85 M/UL (ref 4.05–5.2)
WBC # BLD AUTO: 6.3 K/UL (ref 4.3–11.1)

## 2023-05-11 PROCEDURE — 99214 OFFICE O/P EST MOD 30 MIN: CPT | Performed by: FAMILY MEDICINE

## 2023-05-11 PROCEDURE — 1123F ACP DISCUSS/DSCN MKR DOCD: CPT | Performed by: FAMILY MEDICINE

## 2023-05-11 ASSESSMENT — PATIENT HEALTH QUESTIONNAIRE - PHQ9
1. LITTLE INTEREST OR PLEASURE IN DOING THINGS: 0
SUM OF ALL RESPONSES TO PHQ QUESTIONS 1-9: 0
SUM OF ALL RESPONSES TO PHQ9 QUESTIONS 1 & 2: 0
SUM OF ALL RESPONSES TO PHQ QUESTIONS 1-9: 0
SUM OF ALL RESPONSES TO PHQ QUESTIONS 1-9: 0
2. FEELING DOWN, DEPRESSED OR HOPELESS: 0
SUM OF ALL RESPONSES TO PHQ QUESTIONS 1-9: 0

## 2023-05-11 ASSESSMENT — ENCOUNTER SYMPTOMS
BLOOD IN STOOL: 0
ABDOMINAL PAIN: 0
SHORTNESS OF BREATH: 0
CHEST TIGHTNESS: 0

## 2023-05-11 NOTE — PROGRESS NOTES
[facility]  _______________________________________  MD Angie Crooks, DO Kevin Bragg, MD Patricia Mckeon MD    [address] , [address] [place] [address] Avenue  Phone: [phone]  Fax: [phone]    Maggie Jaramillo (:  1940) is a 81 y.o. female,Established patient, here for evaluation of the following chief complaint(s):  Follow-up         ASSESSMENT/PLAN:    1. Hypotension, unspecified hypotension type  Stable, check check lytes. - Comprehensive Metabolic Panel; Future    2. Gastroesophageal reflux disease without esophagitis  Stable, continue current regimen.     - CBC with Auto Differential; Future    3. Arthritis of both hands  She plays piano a lot. Recommended OA gloves OTC to help bring blood to her hands when she is warming up in the morning. Recheck in 6m    Subjective   SUBJECTIVE/OBJECTIVE:      In , we sent her to cardiology for symptomatic hypotension. They felt with her normal labs and slow return to baseline there was no need for intervention. BP is better today in that her DBP is normal.     Wt Readings from Last 3 Encounters:   23 130 lb (59 kg)   12/15/22 131 lb (59.4 kg)   22 134 lb (60.8 kg)     BP Readings from Last 3 Encounters:   12/15/22 129/84   22 112/60   22 100/62     She takes no meds for BP. She was on PPI and carafate for bad GERD. Symptoms seem to be in remission. Also treated with mag citrate and colace for chronic constipation with miralax PRN as well. Continues to have some constipation but after liberalizing salt that has helped.      Lab Results   Component Value Date    WBC 6.9 10/10/2022    HGB 12.2 10/10/2022    HCT 38.7 10/10/2022    .0 (H) 10/10/2022     10/10/2022     Lab Results   Component Value Date/Time     10/10/2022 09:40 AM    K 5.0 10/10/2022 09:40 AM     10/10/2022 09:40 AM    CO2 28 10/10/2022 09:40 AM    BUN 28 10/10/2022

## 2023-05-12 LAB
ALBUMIN SERPL-MCNC: 4 G/DL (ref 3.2–4.6)
ALBUMIN/GLOB SERPL: 1.4 (ref 0.4–1.6)
ALP SERPL-CCNC: 95 U/L (ref 50–136)
ALT SERPL-CCNC: 51 U/L (ref 12–65)
ANION GAP SERPL CALC-SCNC: 4 MMOL/L (ref 2–11)
AST SERPL-CCNC: 29 U/L (ref 15–37)
BILIRUB SERPL-MCNC: 0.3 MG/DL (ref 0.2–1.1)
BUN SERPL-MCNC: 15 MG/DL (ref 8–23)
CALCIUM SERPL-MCNC: 8.8 MG/DL (ref 8.3–10.4)
CHLORIDE SERPL-SCNC: 104 MMOL/L (ref 101–110)
CO2 SERPL-SCNC: 26 MMOL/L (ref 21–32)
CREAT SERPL-MCNC: 0.9 MG/DL (ref 0.6–1)
GLOBULIN SER CALC-MCNC: 2.8 G/DL (ref 2.8–4.5)
GLUCOSE SERPL-MCNC: 75 MG/DL (ref 65–100)
POTASSIUM SERPL-SCNC: 5.3 MMOL/L (ref 3.5–5.1)
PROT SERPL-MCNC: 6.8 G/DL (ref 6.3–8.2)
SODIUM SERPL-SCNC: 134 MMOL/L (ref 133–143)

## 2023-06-08 ENCOUNTER — TELEMEDICINE (OUTPATIENT)
Dept: FAMILY MEDICINE CLINIC | Facility: CLINIC | Age: 83
End: 2023-06-08
Payer: MEDICARE

## 2023-06-08 DIAGNOSIS — Z00.00 ENCOUNTER FOR ANNUAL WELLNESS EXAM IN MEDICARE PATIENT: Primary | ICD-10-CM

## 2023-06-08 PROCEDURE — G0439 PPPS, SUBSEQ VISIT: HCPCS | Performed by: FAMILY MEDICINE

## 2023-06-08 PROCEDURE — 1123F ACP DISCUSS/DSCN MKR DOCD: CPT | Performed by: FAMILY MEDICINE

## 2023-06-08 SDOH — ECONOMIC STABILITY: INCOME INSECURITY: HOW HARD IS IT FOR YOU TO PAY FOR THE VERY BASICS LIKE FOOD, HOUSING, MEDICAL CARE, AND HEATING?: NOT HARD AT ALL

## 2023-06-08 SDOH — ECONOMIC STABILITY: FOOD INSECURITY: WITHIN THE PAST 12 MONTHS, THE FOOD YOU BOUGHT JUST DIDN'T LAST AND YOU DIDN'T HAVE MONEY TO GET MORE.: NEVER TRUE

## 2023-06-08 SDOH — ECONOMIC STABILITY: HOUSING INSECURITY
IN THE LAST 12 MONTHS, WAS THERE A TIME WHEN YOU DID NOT HAVE A STEADY PLACE TO SLEEP OR SLEPT IN A SHELTER (INCLUDING NOW)?: NO

## 2023-06-08 SDOH — ECONOMIC STABILITY: FOOD INSECURITY: WITHIN THE PAST 12 MONTHS, YOU WORRIED THAT YOUR FOOD WOULD RUN OUT BEFORE YOU GOT MONEY TO BUY MORE.: NEVER TRUE

## 2023-06-08 ASSESSMENT — PATIENT HEALTH QUESTIONNAIRE - PHQ9
1. LITTLE INTEREST OR PLEASURE IN DOING THINGS: 0
SUM OF ALL RESPONSES TO PHQ9 QUESTIONS 1 & 2: 0
SUM OF ALL RESPONSES TO PHQ QUESTIONS 1-9: 0
2. FEELING DOWN, DEPRESSED OR HOPELESS: 0
SUM OF ALL RESPONSES TO PHQ QUESTIONS 1-9: 0

## 2023-06-08 ASSESSMENT — LIFESTYLE VARIABLES
HOW OFTEN DO YOU HAVE A DRINK CONTAINING ALCOHOL: NEVER
HOW MANY STANDARD DRINKS CONTAINING ALCOHOL DO YOU HAVE ON A TYPICAL DAY: PATIENT DOES NOT DRINK

## 2023-06-08 NOTE — PATIENT INSTRUCTIONS
Wait for an ambulance. Do not try to drive yourself. Watch closely for changes in your health, and be sure to contact your doctor if you have any problems. Where can you learn more? Go to http://www.espinosa.com/ and enter F075 to learn more about \"A Healthy Heart: Care Instructions. \"  Current as of: September 7, 2022               Content Version: 13.6  © 2006-2023 [employer]. Care instructions adapted under license by [employer] [facility] ([facility]). If you have questions about a medical condition or this instruction, always ask your healthcare professional. [place] [address] any warranty or liability for your use of this information. Personalized Preventive Plan for [place] Hem - 6/8/2023  Medicare offers a range of preventive health benefits. Some of the tests and screenings are paid in full while other may be subject to a deductible, co-insurance, and/or copay. Some of these benefits include a comprehensive review of your medical history including lifestyle, illnesses that may run in your family, and various assessments and screenings as appropriate. After reviewing your medical record and screening and assessments performed today your provider may have ordered immunizations, labs, imaging, and/or referrals for you. A list of these orders (if applicable) as well as your Preventive Care list are included within your After Visit Summary for your review. Other Preventive Recommendations:    A preventive eye exam performed by an eye specialist is recommended every 1-2 years to screen for glaucoma; cataracts, macular degeneration, and other eye disorders. A preventive dental visit is recommended every 6 months. Try to get at least 150 minutes of exercise per week or 10,000 steps per day on a pedometer . Order or download the FREE \"Exercise & Physical Activity: Your Everyday Guide\" from [employer].  Call [phone] or search [facility]

## 2023-06-08 NOTE — PROGRESS NOTES
Yes Eduar Covington MD   polyethylene glycol (GLYCOLAX) 17 g packet Take by mouth as needed Yes Historical Provider, MD   Magnesium Citrate 100 MG CAPS Take by mouth Yes Historical Provider, MD   Multiple Vitamins-Minerals (MULTIVITAMIN ADULTS PO) Take 1 tablet by mouth daily Yes Historical Provider, MD   Probiotic Product (PROBIOTIC BLEND PO) Strength: ; Form: ; SIG: take 1 tab  daily Yes Historical Provider, MD       CareTeam (Including outside providers/suppliers regularly involved in providing care):   Patient Care Team:  Eduar Covington MD as PCP - Jose L Murphy MD as PCP - Empaneled Provider     Reviewed and updated this visit:          So Sebastian, was evaluated through a synchronous (real-time) audio-video encounter. The patient (or guardian if applicable) is aware that this is a billable service, which includes applicable co-pays. This Virtual Visit was conducted with patient's (and/or legal guardian's) consent. Patient identification was verified, and a caregiver was present when appropriate.    The patient was located at Home: [id number] [place] [address] [facility]  Provider was located at [facility] ([address] [place] [address] Dept): [address] ,  [address]

## 2023-11-09 ENCOUNTER — OFFICE VISIT (OUTPATIENT)
Dept: FAMILY MEDICINE CLINIC | Facility: CLINIC | Age: 83
End: 2023-11-09
Payer: MEDICARE

## 2023-11-09 VITALS
BODY MASS INDEX: 20.4 KG/M2 | WEIGHT: 130 LBS | HEIGHT: 67 IN | DIASTOLIC BLOOD PRESSURE: 50 MMHG | HEART RATE: 60 BPM | SYSTOLIC BLOOD PRESSURE: 131 MMHG

## 2023-11-09 DIAGNOSIS — K21.9 GASTROESOPHAGEAL REFLUX DISEASE WITHOUT ESOPHAGITIS: ICD-10-CM

## 2023-11-09 DIAGNOSIS — E55.9 AVITAMINOSIS D: ICD-10-CM

## 2023-11-09 DIAGNOSIS — K59.09 CHRONIC CONSTIPATION: Primary | ICD-10-CM

## 2023-11-09 DIAGNOSIS — K59.09 CHRONIC CONSTIPATION: ICD-10-CM

## 2023-11-09 LAB
25(OH)D3 SERPL-MCNC: 71.2 NG/ML (ref 30–100)
ALBUMIN SERPL-MCNC: 3.6 G/DL (ref 3.2–4.6)
ALBUMIN/GLOB SERPL: 1.2 (ref 0.4–1.6)
ALP SERPL-CCNC: 105 U/L (ref 50–136)
ALT SERPL-CCNC: 38 U/L (ref 12–65)
ANION GAP SERPL CALC-SCNC: 8 MMOL/L (ref 2–11)
AST SERPL-CCNC: 24 U/L (ref 15–37)
BASOPHILS # BLD: 0 K/UL (ref 0–0.2)
BASOPHILS NFR BLD: 1 % (ref 0–2)
BILIRUB SERPL-MCNC: 0.3 MG/DL (ref 0.2–1.1)
BUN SERPL-MCNC: 12 MG/DL (ref 8–23)
CALCIUM SERPL-MCNC: 9.1 MG/DL (ref 8.3–10.4)
CHLORIDE SERPL-SCNC: 99 MMOL/L (ref 101–110)
CO2 SERPL-SCNC: 25 MMOL/L (ref 21–32)
CREAT SERPL-MCNC: 0.8 MG/DL (ref 0.6–1)
DIFFERENTIAL METHOD BLD: ABNORMAL
EOSINOPHIL # BLD: 0.1 K/UL (ref 0–0.8)
EOSINOPHIL NFR BLD: 2 % (ref 0.5–7.8)
ERYTHROCYTE [DISTWIDTH] IN BLOOD BY AUTOMATED COUNT: 13.5 % (ref 11.9–14.6)
GLOBULIN SER CALC-MCNC: 3 G/DL (ref 2.8–4.5)
GLUCOSE SERPL-MCNC: 96 MG/DL (ref 65–100)
HCT VFR BLD AUTO: 36.4 % (ref 35.8–46.3)
HGB BLD-MCNC: 11.9 G/DL (ref 11.7–15.4)
IMM GRANULOCYTES # BLD AUTO: 0 K/UL (ref 0–0.5)
IMM GRANULOCYTES NFR BLD AUTO: 0 % (ref 0–5)
LYMPHOCYTES # BLD: 1.5 K/UL (ref 0.5–4.6)
LYMPHOCYTES NFR BLD: 21 % (ref 13–44)
MCH RBC QN AUTO: 31.6 PG (ref 26.1–32.9)
MCHC RBC AUTO-ENTMCNC: 32.7 G/DL (ref 31.4–35)
MCV RBC AUTO: 96.6 FL (ref 82–102)
MONOCYTES # BLD: 0.9 K/UL (ref 0.1–1.3)
MONOCYTES NFR BLD: 13 % (ref 4–12)
NEUTS SEG # BLD: 4.7 K/UL (ref 1.7–8.2)
NEUTS SEG NFR BLD: 63 % (ref 43–78)
NRBC # BLD: 0 K/UL (ref 0–0.2)
PLATELET # BLD AUTO: 314 K/UL (ref 150–450)
PMV BLD AUTO: 9.9 FL (ref 9.4–12.3)
POTASSIUM SERPL-SCNC: 4.6 MMOL/L (ref 3.5–5.1)
PROT SERPL-MCNC: 6.6 G/DL (ref 6.3–8.2)
RBC # BLD AUTO: 3.77 M/UL (ref 4.05–5.2)
SODIUM SERPL-SCNC: 132 MMOL/L (ref 133–143)
TSH, 3RD GENERATION: 2.16 UIU/ML (ref 0.36–3.74)
WBC # BLD AUTO: 7.3 K/UL (ref 4.3–11.1)

## 2023-11-09 PROCEDURE — 99214 OFFICE O/P EST MOD 30 MIN: CPT | Performed by: FAMILY MEDICINE

## 2023-11-09 PROCEDURE — 1123F ACP DISCUSS/DSCN MKR DOCD: CPT | Performed by: FAMILY MEDICINE

## 2023-11-09 RX ORDER — LUBIPROSTONE 24 UG/1
24 CAPSULE ORAL 2 TIMES DAILY WITH MEALS
Qty: 180 CAPSULE | Refills: 1 | Status: SHIPPED | OUTPATIENT
Start: 2023-11-09

## 2023-11-09 RX ORDER — SUCRALFATE 1 G/1
1 TABLET ORAL 4 TIMES DAILY
Qty: 120 TABLET | Refills: 2 | Status: SHIPPED | OUTPATIENT
Start: 2023-11-09

## 2023-11-09 ASSESSMENT — ENCOUNTER SYMPTOMS
BLOOD IN STOOL: 0
CONSTIPATION: 1
ABDOMINAL PAIN: 0
SHORTNESS OF BREATH: 0
CHEST TIGHTNESS: 0

## 2023-11-09 NOTE — PROGRESS NOTES
No stridor. No wheezing. Abdominal:      General: Bowel sounds are normal. There is no distension. Tenderness: There is no abdominal tenderness. There is no right CVA tenderness or left CVA tenderness. Comments: Vertical umbilical incision healed well  Palpable stool in the RUQ   Musculoskeletal:         General: No swelling or tenderness. Normal range of motion. Cervical back: Normal range of motion. No rigidity. Right lower leg: No edema. Left lower leg: No edema. Comments: OA changes BL hands   Skin:     General: Skin is warm and dry. Coloration: Skin is not pale. Neurological:      General: No focal deficit present. Mental Status: She is alert and oriented to person, place, and time. Mental status is at baseline. Cranial Nerves: No cranial nerve deficit. Deep Tendon Reflexes: Reflexes normal.   Psychiatric:         Mood and Affect: Mood normal.         Behavior: Behavior normal.         Thought Content: Thought content normal.         Judgment: Judgment normal.                  An electronic signature was used to authenticate this note.     --Anu Bailey MD

## 2024-01-16 ENCOUNTER — OFFICE VISIT (OUTPATIENT)
Dept: FAMILY MEDICINE CLINIC | Facility: CLINIC | Age: 84
End: 2024-01-16
Payer: MEDICARE

## 2024-01-16 VITALS
HEART RATE: 66 BPM | HEIGHT: 67 IN | WEIGHT: 130 LBS | BODY MASS INDEX: 20.4 KG/M2 | SYSTOLIC BLOOD PRESSURE: 100 MMHG | DIASTOLIC BLOOD PRESSURE: 44 MMHG

## 2024-01-16 DIAGNOSIS — E55.9 AVITAMINOSIS D: ICD-10-CM

## 2024-01-16 DIAGNOSIS — K59.09 CHRONIC CONSTIPATION: Primary | ICD-10-CM

## 2024-01-16 PROCEDURE — 1123F ACP DISCUSS/DSCN MKR DOCD: CPT | Performed by: FAMILY MEDICINE

## 2024-01-16 PROCEDURE — 99214 OFFICE O/P EST MOD 30 MIN: CPT | Performed by: FAMILY MEDICINE

## 2024-01-16 RX ORDER — SENNA AND DOCUSATE SODIUM 50; 8.6 MG/1; MG/1
1 TABLET, FILM COATED ORAL DAILY
Qty: 90 TABLET | Refills: 1 | Status: SHIPPED | OUTPATIENT
Start: 2024-01-16

## 2024-01-16 ASSESSMENT — ENCOUNTER SYMPTOMS
SHORTNESS OF BREATH: 0
CHEST TIGHTNESS: 0
CONSTIPATION: 1
ABDOMINAL PAIN: 1
BLOOD IN STOOL: 0

## 2024-01-16 NOTE — PROGRESS NOTES
[facility]  _______________________________________  MD Gricelda Knight DO Elizabeth King, MD Delia Glass MD    [address] [place] [address]  Phone: [phone]  Fax: [phone]    Chayo Yip (:  1940) is a 83 y.o. female,Established patient, here for evaluation of the following chief complaint(s):  Constipation         ASSESSMENT/PLAN:    1. Chronic constipation  Will try her back on linzess but at the half dose this time. Nothing else works. Check KUB for stool burden, we both think she is empty as of this moment. Also refilled her on seenakot which has helped previously.   - XR ABDOMEN (KUB) (SINGLE AP VIEW); Future  - linaclotide (LINZESS) 145 MCG capsule; Take 1 capsule by mouth every morning (before breakfast)  Dispense: 30 capsule; Refill: 5  - sennosides-docusate sodium (SENOKOT-S) 8.6-50 MG tablet; Take 1 tablet by mouth daily  Dispense: 90 tablet; Refill: 1    2. Avitaminosis D  Stable, continue current regimen.         Recheck in 4m already scheduled    Subjective   SUBJECTIVE/OBJECTIVE:      In , we sent her to cardiology for symptomatic hypotension. They felt with her normal labs and slow return to baseline there was no need for intervention.  DBP a little low but she is not feeling it.     Wt Readings from Last 3 Encounters:   24 59 kg (130 lb)   23 59 kg (130 lb)   23 59 kg (130 lb)     BP Readings from Last 3 Encounters:   24 (!) 100/44   23 (!) 131/50   23 120/60     She takes no meds for BP. She was on PPI and carafate for bad GERD. Symptoms seem to be in remission. Also treated with mag citrate and colace for chronic constipation with miralax PRN as well. In late  was still not moving her stools in an easy manner. She will have to manually disimpact herself some days. We added amitiza 24 BID. This did not help. State sin fact it caused pain. At this point

## 2024-01-17 ENCOUNTER — HOSPITAL ENCOUNTER (OUTPATIENT)
Dept: GENERAL RADIOLOGY | Age: 84
Discharge: HOME | End: 2024-01-19
Payer: MEDICARE

## 2024-01-17 DIAGNOSIS — K59.09 CHRONIC CONSTIPATION: ICD-10-CM

## 2024-01-17 PROCEDURE — 74018 RADEX ABDOMEN 1 VIEW: CPT

## 2024-02-16 DIAGNOSIS — K59.09 CHRONIC CONSTIPATION: ICD-10-CM

## 2024-02-20 DIAGNOSIS — K59.09 CHRONIC CONSTIPATION: ICD-10-CM

## 2024-04-04 DIAGNOSIS — K59.09 CHRONIC CONSTIPATION: Primary | ICD-10-CM

## 2024-05-14 ENCOUNTER — OFFICE VISIT (OUTPATIENT)
Dept: FAMILY MEDICINE CLINIC | Facility: CLINIC | Age: 84
End: 2024-05-14

## 2024-05-14 VITALS
HEIGHT: 67 IN | BODY MASS INDEX: 20.56 KG/M2 | DIASTOLIC BLOOD PRESSURE: 50 MMHG | SYSTOLIC BLOOD PRESSURE: 135 MMHG | WEIGHT: 131 LBS | HEART RATE: 59 BPM

## 2024-05-14 DIAGNOSIS — K59.09 CHRONIC CONSTIPATION: ICD-10-CM

## 2024-05-14 DIAGNOSIS — E55.9 AVITAMINOSIS D: ICD-10-CM

## 2024-05-14 DIAGNOSIS — K59.09 CHRONIC CONSTIPATION: Primary | ICD-10-CM

## 2024-05-14 DIAGNOSIS — K56.41 FECAL IMPACTION (HCC): ICD-10-CM

## 2024-05-14 DIAGNOSIS — I95.9 HYPOTENSION, UNSPECIFIED HYPOTENSION TYPE: ICD-10-CM

## 2024-05-14 LAB
25(OH)D3 SERPL-MCNC: 72.9 NG/ML (ref 30–100)
ALBUMIN SERPL-MCNC: 3.8 G/DL (ref 3.2–4.6)
ALBUMIN/GLOB SERPL: 1.8 (ref 1–1.9)
ALP SERPL-CCNC: 102 U/L (ref 35–104)
ALT SERPL-CCNC: 24 U/L (ref 12–65)
ANION GAP SERPL CALC-SCNC: 10 MMOL/L (ref 9–18)
AST SERPL-CCNC: 32 U/L (ref 15–37)
BASOPHILS # BLD: 0 K/UL (ref 0–0.2)
BASOPHILS NFR BLD: 0 % (ref 0–2)
BILIRUB SERPL-MCNC: 0.4 MG/DL (ref 0–1.2)
BUN SERPL-MCNC: 12 MG/DL (ref 8–23)
CALCIUM SERPL-MCNC: 8.9 MG/DL (ref 8.8–10.2)
CHLORIDE SERPL-SCNC: 94 MMOL/L (ref 98–107)
CO2 SERPL-SCNC: 25 MMOL/L (ref 20–28)
CREAT SERPL-MCNC: 0.86 MG/DL (ref 0.6–1.1)
DIFFERENTIAL METHOD BLD: ABNORMAL
EOSINOPHIL # BLD: 0.1 K/UL (ref 0–0.8)
EOSINOPHIL NFR BLD: 1 % (ref 0.5–7.8)
ERYTHROCYTE [DISTWIDTH] IN BLOOD BY AUTOMATED COUNT: 13.1 % (ref 11.9–14.6)
GLOBULIN SER CALC-MCNC: 2.2 G/DL (ref 2.3–3.5)
GLUCOSE SERPL-MCNC: 96 MG/DL (ref 70–99)
HCT VFR BLD AUTO: 34.3 % (ref 35.8–46.3)
HGB BLD-MCNC: 11.4 G/DL (ref 11.7–15.4)
IMM GRANULOCYTES # BLD AUTO: 0 K/UL (ref 0–0.5)
IMM GRANULOCYTES NFR BLD AUTO: 0 % (ref 0–5)
LYMPHOCYTES # BLD: 1.9 K/UL (ref 0.5–4.6)
LYMPHOCYTES NFR BLD: 21 % (ref 13–44)
MCH RBC QN AUTO: 31.8 PG (ref 26.1–32.9)
MCHC RBC AUTO-ENTMCNC: 33.2 G/DL (ref 31.4–35)
MCV RBC AUTO: 95.8 FL (ref 82–102)
MONOCYTES # BLD: 0.9 K/UL (ref 0.1–1.3)
MONOCYTES NFR BLD: 9 % (ref 4–12)
NEUTS SEG # BLD: 6.3 K/UL (ref 1.7–8.2)
NEUTS SEG NFR BLD: 69 % (ref 43–78)
NRBC # BLD: 0 K/UL (ref 0–0.2)
PLATELET # BLD AUTO: 319 K/UL (ref 150–450)
PMV BLD AUTO: 9.5 FL (ref 9.4–12.3)
POTASSIUM SERPL-SCNC: 5.2 MMOL/L (ref 3.5–5.1)
PROT SERPL-MCNC: 6 G/DL (ref 6.3–8.2)
RBC # BLD AUTO: 3.58 M/UL (ref 4.05–5.2)
SODIUM SERPL-SCNC: 130 MMOL/L (ref 136–145)
TSH, 3RD GENERATION: 2.48 UIU/ML (ref 0.27–4.2)
WBC # BLD AUTO: 9.2 K/UL (ref 4.3–11.1)

## 2024-05-14 ASSESSMENT — ENCOUNTER SYMPTOMS
SHORTNESS OF BREATH: 0
ABDOMINAL PAIN: 0
CHEST TIGHTNESS: 0
CONSTIPATION: 1

## 2024-05-14 NOTE — PROGRESS NOTES
[facility]  _______________________________________  MD Gricelda Knight DO Elizabeth King, MD Delia Glass MD    [address] [place] [address]  Phone: [phone]  Fax: [phone]    Chayo Yip (:  1940) is a 84 y.o. female,Established patient, here for evaluation of the following chief complaint(s):  Constipation (/)      Assessment & Plan   ASSESSMENT/PLAN:    1. Chronic constipation  Getting worse, likely her adhesions causing bowel blockage of some kind, I am referring to surgery for their opinions on this. Appreciate their help.   - linaclotide (LINZESS) 290 MCG CAPS capsule; Take 1 capsule by mouth every morning (before breakfast)  Dispense: 90 capsule; Refill: 1  - TSH; Future  - Comprehensive Metabolic Panel; Future  - CBC with Auto Differential; Future  - [facility] - Hasrhil Stroud MD, [facility]    2. Hypotension, unspecified hypotension type  Stable, continue current regimen.     - TSH; Future  - Comprehensive Metabolic Panel; Future    3. Avitaminosis D  Stable, continue current regimen.   Check levels.   - Vitamin D 25 Hydroxy; Future    4. Fecal impaction (HCC)  As above. When I brought up the possibility of toxic megacolon in the future or needing a colostomy, she was willing to talk it over with a surgeon. Appreciate their help.   - [facility] Harshil Armendariz MD, [facility]        4w AWV  6m IOV    Subjective   SUBJECTIVE/OBJECTIVE:      In , we sent her to cardiology for symptomatic hypotension. They felt with her normal labs and slow return to baseline there was no need for intervention.  DBP a little low but she is not feeling it.     Wt Readings from Last 3 Encounters:   24 59.4 kg (131 lb)   24 59 kg (130 lb)   23 59 kg (130 lb)     BP Readings from Last 3 Encounters:   24 (!) 135/50   24 (!) 100/44   23 (!) 131/50     She takes no meds for BP. She was on PPI and carafate

## 2024-05-15 DIAGNOSIS — D64.9 ANEMIA, UNSPECIFIED TYPE: Primary | ICD-10-CM

## 2024-05-20 ENCOUNTER — NURSE ONLY (OUTPATIENT)
Dept: FAMILY MEDICINE CLINIC | Facility: CLINIC | Age: 84
End: 2024-05-20

## 2024-05-20 DIAGNOSIS — D64.9 ANEMIA, UNSPECIFIED TYPE: ICD-10-CM

## 2024-05-20 LAB
BASOPHILS # BLD: 0.1 K/UL (ref 0–0.2)
BASOPHILS NFR BLD: 1 % (ref 0–2)
DIFFERENTIAL METHOD BLD: ABNORMAL
EOSINOPHIL # BLD: 0.1 K/UL (ref 0–0.8)
EOSINOPHIL NFR BLD: 2 % (ref 0.5–7.8)
ERYTHROCYTE [DISTWIDTH] IN BLOOD BY AUTOMATED COUNT: 13.2 % (ref 11.9–14.6)
FERRITIN SERPL-MCNC: 104 NG/ML (ref 8–388)
HCT VFR BLD AUTO: 37.1 % (ref 35.8–46.3)
HGB BLD-MCNC: 12 G/DL (ref 11.7–15.4)
IMM GRANULOCYTES # BLD AUTO: 0 K/UL (ref 0–0.5)
IMM GRANULOCYTES NFR BLD AUTO: 1 % (ref 0–5)
IRON SATN MFR SERPL: 27 % (ref 20–50)
IRON SERPL-MCNC: 86 UG/DL (ref 35–100)
LYMPHOCYTES # BLD: 1.3 K/UL (ref 0.5–4.6)
LYMPHOCYTES NFR BLD: 19 % (ref 13–44)
MCH RBC QN AUTO: 31.3 PG (ref 26.1–32.9)
MCHC RBC AUTO-ENTMCNC: 32.3 G/DL (ref 31.4–35)
MCV RBC AUTO: 96.6 FL (ref 82–102)
MONOCYTES # BLD: 1.1 K/UL (ref 0.1–1.3)
MONOCYTES NFR BLD: 17 % (ref 4–12)
NEUTS SEG # BLD: 4 K/UL (ref 1.7–8.2)
NEUTS SEG NFR BLD: 60 % (ref 43–78)
NRBC # BLD: 0 K/UL (ref 0–0.2)
PLATELET # BLD AUTO: 322 K/UL (ref 150–450)
PMV BLD AUTO: 9.6 FL (ref 9.4–12.3)
RBC # BLD AUTO: 3.84 M/UL (ref 4.05–5.2)
TIBC SERPL-MCNC: 322 UG/DL (ref 240–450)
UIBC SERPL-MCNC: 236 UG/DL (ref 112–347)
WBC # BLD AUTO: 6.5 K/UL (ref 4.3–11.1)

## 2024-06-10 ENCOUNTER — OFFICE VISIT (OUTPATIENT)
Age: 84
End: 2024-06-10
Payer: MEDICARE

## 2024-06-10 VITALS
DIASTOLIC BLOOD PRESSURE: 60 MMHG | BODY MASS INDEX: 19.56 KG/M2 | SYSTOLIC BLOOD PRESSURE: 129 MMHG | HEART RATE: 55 BPM | WEIGHT: 124.6 LBS | HEIGHT: 67 IN

## 2024-06-10 DIAGNOSIS — K59.04 CHRONIC IDIOPATHIC CONSTIPATION: Primary | ICD-10-CM

## 2024-06-10 DIAGNOSIS — K59.09 CHRONIC CONSTIPATION: Primary | ICD-10-CM

## 2024-06-10 PROCEDURE — 1123F ACP DISCUSS/DSCN MKR DOCD: CPT | Performed by: SURGERY

## 2024-06-10 PROCEDURE — 99202 OFFICE O/P NEW SF 15 MIN: CPT | Performed by: SURGERY

## 2024-06-10 NOTE — PROGRESS NOTES
Harshil Stroud MD   General and Robotic surgery  [address] [facility] [address], [address]  [place]  [address]  Phone [phone]   Fax [phone]      Date of visit: 6/10/2024      Primary/Requesting provider: Harshil Luna MD         Name: Chayo Yip      MRN: [id number]       : 1940       Age: 84 y.o.    Sex: female        PCP: Harshil Luna MD     CC:    Chief Complaint   Patient presents with    New Patient     NP- possible colostomy; toxic megacolon/Coben         HPI:     Chayo Yip is a 84 y.o. female with a long-term history of chronic constipation requiring a fairly in-depth regimen to keep her bowel function remotely regular.  She is also has a history of open cholecystectomy and a tubal ligation.  In addition she has had a duodenal ulcer identified and treated.  She is here to discuss surgical options which I have informed her, are not great.  In my opinion, essentially a subtotal colectomy is the only option if this is a true functional colonic issue.  She underwent colonoscopy in  with no abnormalities noted.  The way her symptoms play out seem to be more of an upper GI tract issue in terms of the timing after meals.  I think we need to reevaluate her abdomen in total.  A CT with IV, oral and rectal contrast will give us a look at not only her colon and rectum but the small bowel and upper GI tract as well.  Will take a look at everything and resume the conversation.      Past Medical History:   Diagnosis Date    Constipation     Duodenal ulcer     Endometriosis         Past Surgical History:   Procedure Laterality Date    CERVICAL LAMINECTOMY      CHOLECYSTECTOMY, LAPAROSCOPIC N/A 2022    CHOLECYSTECTOMY LAPAROSCOPIC performed by Garrison Resendez MD at [facility] MAIN OR    TONSILLECTOMY      TUBAL LIGATION          Current Outpatient Medications   Medication Sig Dispense Refill    linaclotide (LINZESS) 290 MCG CAPS capsule Take 1 capsule by mouth every morning

## 2024-06-11 ENCOUNTER — TELEMEDICINE (OUTPATIENT)
Dept: FAMILY MEDICINE CLINIC | Facility: CLINIC | Age: 84
End: 2024-06-11
Payer: MEDICARE

## 2024-06-11 DIAGNOSIS — Z00.00 ENCOUNTER FOR ANNUAL WELLNESS EXAM IN MEDICARE PATIENT: Primary | ICD-10-CM

## 2024-06-11 DIAGNOSIS — K59.04 CHRONIC IDIOPATHIC CONSTIPATION: Primary | ICD-10-CM

## 2024-06-11 PROCEDURE — 1123F ACP DISCUSS/DSCN MKR DOCD: CPT | Performed by: FAMILY MEDICINE

## 2024-06-11 PROCEDURE — G0439 PPPS, SUBSEQ VISIT: HCPCS | Performed by: FAMILY MEDICINE

## 2024-06-11 SDOH — ECONOMIC STABILITY: INCOME INSECURITY: HOW HARD IS IT FOR YOU TO PAY FOR THE VERY BASICS LIKE FOOD, HOUSING, MEDICAL CARE, AND HEATING?: NOT HARD AT ALL

## 2024-06-11 SDOH — ECONOMIC STABILITY: FOOD INSECURITY: WITHIN THE PAST 12 MONTHS, YOU WORRIED THAT YOUR FOOD WOULD RUN OUT BEFORE YOU GOT MONEY TO BUY MORE.: NEVER TRUE

## 2024-06-11 SDOH — ECONOMIC STABILITY: FOOD INSECURITY: WITHIN THE PAST 12 MONTHS, THE FOOD YOU BOUGHT JUST DIDN'T LAST AND YOU DIDN'T HAVE MONEY TO GET MORE.: NEVER TRUE

## 2024-06-11 ASSESSMENT — PATIENT HEALTH QUESTIONNAIRE - PHQ9
2. FEELING DOWN, DEPRESSED OR HOPELESS: NOT AT ALL
1. LITTLE INTEREST OR PLEASURE IN DOING THINGS: NOT AT ALL
SUM OF ALL RESPONSES TO PHQ QUESTIONS 1-9: 0
SUM OF ALL RESPONSES TO PHQ9 QUESTIONS 1 & 2: 0
SUM OF ALL RESPONSES TO PHQ QUESTIONS 1-9: 0

## 2024-06-11 NOTE — PROGRESS NOTES
Medicare Annual Wellness Visit    Chayo Yip is here for Medicare AWV    Assessment & Plan     1. 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 Not interested in mRNA shots.         Recommendations for Preventive Services Due: see orders and patient instructions/AVS.  Recommended screening schedule for the next 5-10 years is provided to the patient in written form: see Patient Instructions/AVS.     Return in about 3 months (around 9/11/2024) for Already scheduled.     Subjective       HM:  COVID/RSV: She is not inclined to get mRNA shots at this point. 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     Patient's complete Health Risk Assessment and screening values have been reviewed and are found in Flowsheets. The following problems were reviewed today and where indicated follow up appointments were made and/or referrals ordered.    No Positive Risk Factors identified today.                                  Objective      Patient-Reported Vitals  No data recorded            Allergies   Allergen Reactions    Pneumococcal 13-Rosalva Conj Vacc Swelling    Sulfamethoxazole-Trimethoprim Hives    Trimethoprim     Wasp Venom Protein      Other reaction(s): Hives/Swelling-Allergy  Other reaction(s): Hives/Swelling-Allergy      Oxycodone-Acetaminophen Nausea And Vomiting     Prior to Visit Medications    Medication Sig Taking? Authorizing Provider   linaclotide (LINZESS) 290 MCG CAPS capsule Take 1 capsule by mouth every morning (before breakfast) Yes Harshil Luna MD   sennosides-docusate sodium (SENOKOT-S) 8.6-50 MG tablet Take 1 tablet by mouth daily Yes Harshil Luna MD   sucralfate (CARAFATE) 1 GM tablet Take 1 tablet by mouth 4 times daily TAKE 1 TABLET BY MOUTH FOUR TIMES DAILY Yes Harshil Luna MD   Multiple Vitamins-Minerals (PRESERVISION AREDS 2 PO) Take by mouth Yes Provider, MD Bina

## 2024-06-24 ENCOUNTER — HOSPITAL ENCOUNTER (OUTPATIENT)
Dept: CT IMAGING | Age: 84
Discharge: HOME | End: 2024-06-27
Attending: SURGERY
Payer: MEDICARE

## 2024-06-24 DIAGNOSIS — K59.04 CHRONIC IDIOPATHIC CONSTIPATION: ICD-10-CM

## 2024-06-24 LAB — CREAT BLD-MCNC: 0.74 MG/DL (ref 0.8–1.5)

## 2024-06-24 PROCEDURE — 74177 CT ABD & PELVIS W/CONTRAST: CPT

## 2024-06-24 PROCEDURE — 6360000004 HC RX CONTRAST MEDICATION: Performed by: SURGERY

## 2024-06-24 PROCEDURE — 82565 ASSAY OF CREATININE: CPT

## 2024-06-24 RX ADMIN — IOPAMIDOL 100 ML: 755 INJECTION, SOLUTION INTRAVENOUS at 11:19

## 2024-06-24 RX ADMIN — DIATRIZOATE MEGLUMINE AND DIATRIZOATE SODIUM 15 ML: 660; 100 LIQUID ORAL; RECTAL at 11:18

## 2024-07-22 ENCOUNTER — OFFICE VISIT (OUTPATIENT)
Age: 84
End: 2024-07-22
Payer: MEDICARE

## 2024-07-22 VITALS
HEIGHT: 67 IN | HEART RATE: 92 BPM | SYSTOLIC BLOOD PRESSURE: 109 MMHG | BODY MASS INDEX: 19.62 KG/M2 | DIASTOLIC BLOOD PRESSURE: 56 MMHG | WEIGHT: 125 LBS

## 2024-07-22 DIAGNOSIS — N80.9 ENDOMETRIOSIS: Primary | ICD-10-CM

## 2024-07-22 DIAGNOSIS — K59.01 CONSTIPATION BY DELAYED COLONIC TRANSIT: Primary | ICD-10-CM

## 2024-07-22 PROCEDURE — 1090F PRES/ABSN URINE INCON ASSESS: CPT | Performed by: SURGERY

## 2024-07-22 PROCEDURE — G8427 DOCREV CUR MEDS BY ELIG CLIN: HCPCS | Performed by: SURGERY

## 2024-07-22 PROCEDURE — G8399 PT W/DXA RESULTS DOCUMENT: HCPCS | Performed by: SURGERY

## 2024-07-22 PROCEDURE — 1123F ACP DISCUSS/DSCN MKR DOCD: CPT | Performed by: SURGERY

## 2024-07-22 PROCEDURE — 1036F TOBACCO NON-USER: CPT | Performed by: SURGERY

## 2024-07-22 PROCEDURE — G8420 CALC BMI NORM PARAMETERS: HCPCS | Performed by: SURGERY

## 2024-07-22 PROCEDURE — 99212 OFFICE O/P EST SF 10 MIN: CPT | Performed by: SURGERY

## 2024-07-22 NOTE — PROGRESS NOTES
Dispense Refill    linaclotide (LINZESS) 290 MCG CAPS capsule Take 1 capsule by mouth every morning (before breakfast) 90 capsule 1    sennosides-docusate sodium (SENOKOT-S) 8.6-50 MG tablet Take 1 tablet by mouth daily 90 tablet 1    sucralfate (CARAFATE) 1 GM tablet Take 1 tablet by mouth 4 times daily TAKE 1 TABLET BY MOUTH FOUR TIMES DAILY 120 tablet 2    Multiple Vitamins-Minerals (PRESERVISION AREDS 2 PO) Take by mouth      cyanocobalamin 100 MCG tablet Take 1 tablet by mouth daily      vitamin D-3 (CHOLECALCIFEROL) 125 MCG (5000 UT) TABS Take 1 tablet by mouth daily      polyethylene glycol (GLYCOLAX) 17 g packet Take by mouth as needed      Magnesium Citrate 100 MG CAPS Take by mouth      Multiple Vitamins-Minerals (MULTIVITAMIN ADULTS PO) Take 1 tablet by mouth daily      Probiotic Product (PROBIOTIC BLEND PO) Strength: ; Form: ; SIG: take 1 tab  daily       No current facility-administered medications for this visit.        Allergies   Allergen Reactions    Pneumococcal 13-Rosalva Conj Vacc Swelling    Sulfamethoxazole-Trimethoprim Hives    Trimethoprim     Wasp Venom Protein      Other reaction(s): Hives/Swelling-Allergy  Other reaction(s): Hives/Swelling-Allergy      Oxycodone-Acetaminophen Nausea And Vomiting        Social History       Tobacco History       Smoking Status  Never      Smokeless Tobacco Use  Never              Alcohol History       Alcohol Use Status  No              Drug Use       Drug Use Status  No              Sexual Activity       Sexually Active  Not Currently Partners  Male Comment  One man only!! 196 until he  2020                     Family History   Problem Relation Age of Onset    Diabetes Brother     Heart Disease Brother         Stent put in at [facility]    Diabetes Father         Old age only    Vision Loss Father         Glaucoma-- age 80    Heart Disease Mother          at 89 yrs old of heart attack    Heart Disease Brother         Heart attack, age 70,

## 2024-07-29 ENCOUNTER — OFFICE VISIT (OUTPATIENT)
Dept: FAMILY MEDICINE CLINIC | Facility: CLINIC | Age: 84
End: 2024-07-29
Payer: MEDICARE

## 2024-07-29 VITALS
HEIGHT: 67 IN | SYSTOLIC BLOOD PRESSURE: 119 MMHG | WEIGHT: 126 LBS | BODY MASS INDEX: 19.78 KG/M2 | DIASTOLIC BLOOD PRESSURE: 46 MMHG | HEART RATE: 59 BPM

## 2024-07-29 DIAGNOSIS — R10.32 CHRONIC LLQ PAIN: Primary | ICD-10-CM

## 2024-07-29 DIAGNOSIS — G89.29 CHRONIC LLQ PAIN: Primary | ICD-10-CM

## 2024-07-29 DIAGNOSIS — N80.9 ENDOMETRIOSIS: ICD-10-CM

## 2024-07-29 DIAGNOSIS — K59.09 CHRONIC CONSTIPATION: ICD-10-CM

## 2024-07-29 PROCEDURE — G8427 DOCREV CUR MEDS BY ELIG CLIN: HCPCS | Performed by: FAMILY MEDICINE

## 2024-07-29 PROCEDURE — G8399 PT W/DXA RESULTS DOCUMENT: HCPCS | Performed by: FAMILY MEDICINE

## 2024-07-29 PROCEDURE — 99214 OFFICE O/P EST MOD 30 MIN: CPT | Performed by: FAMILY MEDICINE

## 2024-07-29 PROCEDURE — 1090F PRES/ABSN URINE INCON ASSESS: CPT | Performed by: FAMILY MEDICINE

## 2024-07-29 PROCEDURE — 1123F ACP DISCUSS/DSCN MKR DOCD: CPT | Performed by: FAMILY MEDICINE

## 2024-07-29 PROCEDURE — G8420 CALC BMI NORM PARAMETERS: HCPCS | Performed by: FAMILY MEDICINE

## 2024-07-29 PROCEDURE — G2211 COMPLEX E/M VISIT ADD ON: HCPCS | Performed by: FAMILY MEDICINE

## 2024-07-29 PROCEDURE — 1036F TOBACCO NON-USER: CPT | Performed by: FAMILY MEDICINE

## 2024-07-29 RX ORDER — DICYCLOMINE HCL 20 MG
20 TABLET ORAL 4 TIMES DAILY PRN
Qty: 120 TABLET | Refills: 1 | Status: SHIPPED | OUTPATIENT
Start: 2024-07-29

## 2024-07-29 ASSESSMENT — ENCOUNTER SYMPTOMS
CHEST TIGHTNESS: 0
BLOOD IN STOOL: 0
ABDOMINAL PAIN: 0
SHORTNESS OF BREATH: 0

## 2024-07-29 NOTE — PROGRESS NOTES
Cardiovascular:  Negative for chest pain.   Gastrointestinal:  Negative for abdominal pain and blood in stool.   Genitourinary:  Negative for hematuria.   Neurological:  Negative for syncope.          Objective   Physical Exam  Vitals and nursing note reviewed.   Constitutional:       Appearance: Normal appearance.   HENT:      Head: Normocephalic and atraumatic.      Right Ear: External ear normal.      Left Ear: External ear normal.      Mouth/Throat:      Mouth: Mucous membranes are moist.   Eyes:      General: No scleral icterus.     Extraocular Movements: Extraocular movements intact.      Pupils: Pupils are equal, round, and reactive to light.   Cardiovascular:      Rate and Rhythm: Normal rate and regular rhythm.      Pulses: Normal pulses.      Heart sounds: No murmur heard.     No friction rub. No gallop.   Pulmonary:      Effort: Pulmonary effort is normal. No respiratory distress.      Breath sounds: Normal breath sounds. No stridor. No wheezing.   Abdominal:      General: Bowel sounds are normal. There is distension.      Tenderness: There is no abdominal tenderness. There is no right CVA tenderness or left CVA tenderness.      Comments: Low quadrants mildly distended with increased tympani, worst in the LLQ, soft NTTP everywhere   Musculoskeletal:         General: No swelling or tenderness. Normal range of motion.      Cervical back: Normal range of motion. No rigidity.      Right lower leg: No edema.      Left lower leg: No edema.   Skin:     General: Skin is warm and dry.      Coloration: Skin is not pale.   Neurological:      General: No focal deficit present.      Mental Status: She is alert and oriented to person, place, and time. Mental status is at baseline.      Cranial Nerves: No cranial nerve deficit.      Deep Tendon Reflexes: Reflexes normal.   Psychiatric:         Mood and Affect: Mood normal.         Behavior: Behavior normal.         Thought Content: Thought content normal.

## 2024-08-26 ENCOUNTER — OFFICE VISIT (OUTPATIENT)
Dept: FAMILY MEDICINE CLINIC | Facility: CLINIC | Age: 84
End: 2024-08-26
Payer: MEDICARE

## 2024-08-26 VITALS
BODY MASS INDEX: 19.97 KG/M2 | HEIGHT: 67 IN | SYSTOLIC BLOOD PRESSURE: 125 MMHG | HEART RATE: 63 BPM | WEIGHT: 127.2 LBS | DIASTOLIC BLOOD PRESSURE: 64 MMHG | TEMPERATURE: 98.1 F

## 2024-08-26 DIAGNOSIS — B02.9 HERPES ZOSTER WITHOUT COMPLICATION: Primary | ICD-10-CM

## 2024-08-26 DIAGNOSIS — T63.301A SPIDER BITE WOUND, ACCIDENTAL OR UNINTENTIONAL, INITIAL ENCOUNTER: ICD-10-CM

## 2024-08-26 PROCEDURE — G2211 COMPLEX E/M VISIT ADD ON: HCPCS | Performed by: FAMILY MEDICINE

## 2024-08-26 PROCEDURE — G8420 CALC BMI NORM PARAMETERS: HCPCS | Performed by: FAMILY MEDICINE

## 2024-08-26 PROCEDURE — G8399 PT W/DXA RESULTS DOCUMENT: HCPCS | Performed by: FAMILY MEDICINE

## 2024-08-26 PROCEDURE — 1036F TOBACCO NON-USER: CPT | Performed by: FAMILY MEDICINE

## 2024-08-26 PROCEDURE — 99214 OFFICE O/P EST MOD 30 MIN: CPT | Performed by: FAMILY MEDICINE

## 2024-08-26 PROCEDURE — 1090F PRES/ABSN URINE INCON ASSESS: CPT | Performed by: FAMILY MEDICINE

## 2024-08-26 PROCEDURE — G8427 DOCREV CUR MEDS BY ELIG CLIN: HCPCS | Performed by: FAMILY MEDICINE

## 2024-08-26 PROCEDURE — 1123F ACP DISCUSS/DSCN MKR DOCD: CPT | Performed by: FAMILY MEDICINE

## 2024-08-26 RX ORDER — VALACYCLOVIR HYDROCHLORIDE 1 G/1
1000 TABLET, FILM COATED ORAL 3 TIMES DAILY
Qty: 21 TABLET | Refills: 0 | Status: SHIPPED | OUTPATIENT
Start: 2024-08-26 | End: 2024-09-02

## 2024-08-26 RX ORDER — PREDNISONE 10 MG/1
TABLET ORAL
Qty: 21 TABLET | Refills: 0 | Status: SHIPPED | OUTPATIENT
Start: 2024-08-26 | End: 2024-09-05

## 2024-08-26 NOTE — PROGRESS NOTES
[address] [address]  Phone: [phone]  Fax: [phone]  Email: [email or web address]      Encounter Info  Chayo Yip; Established patient 84 y.o.female; seen 8/26/2024 for: Insect Bite (Right hand- brown recluse- Pt has been taking 4 different antihistamine), Herpes Zoster (On face, neck, and going in right eye- requesting acyclovir topical or injection), Fatigue, and Diarrhea      Assessment & Plan    1. Herpes zoster without complication  -     predniSONE (DELTASONE) 10 MG tablet; 4 pills daily X 2 days, then 3 pills daily X 2 days, then 2 pills daily X 2 days, then 1 pill daily X 2 days, then 1/2 pill daily X 2 days, Disp-21 tablet, R-0Normal  -     valACYclovir (VALTREX) 1 g tablet; Take 1 tablet by mouth 3 times daily for 7 days, Disp-21 tablet, R-0Normal  2. Spider bite wound, accidental or unintentional, initial encounter  -     predniSONE (DELTASONE) 10 MG tablet; 4 pills daily X 2 days, then 3 pills daily X 2 days, then 2 pills daily X 2 days, then 1 pill daily X 2 days, then 1/2 pill daily X 2 days, Disp-21 tablet, R-0Normal    Problem and/or Symptoms are currently not stable and/or well controlled on current treatment plan. Will have patient follow up as directed and make the following changes for further evaluation and/or treatment:     For recurrent herpes zoster will Tx with Valtrex regimen X 7 D & a low dose steroid taper X 10 D for Sx relief. The steroid taper should also help the suspected spider bite, which does not have any evidence of infection or necrosis at this time.       Check Out Instructions  Return for F/U Visit PCP PRN.      Subjective & Objective    HPI  Pt with several days of burning & itchy rash affecting the R side of her face; states she's had shingles before & this feels quite similar.     Also, pt had a suspected spider bite on her R hand yesterday; thinks it may have been a brown recluse as she's had 2 prior bites  from this spider, however she never saw what it was that actually bit her this time.     Review of Systems    Physical Exam  Skin:     Findings: Erythema and rash present. Rash is macular and papular.      Comments: Rash along V2 & V3 distribution on R side of face    On R hand there is what appears to be a small bite of some kind with some local swelling & redness; no evidence of infection or necrosis however       Vitals:    08/26/24 1428   BP: 125/64   Site: Left Upper Arm   Position: Sitting   Cuff Size: Medium Adult   Pulse: 63   Temp: 98.1 °F (36.7 °C)   Weight: 57.7 kg (127 lb 3.2 oz)   Height: 1.702 m (5' 7\")     BP Readings from Last 3 Encounters:   08/26/24 125/64   07/29/24 (!) 119/46   07/22/24 (!) 109/56     Body mass index is 19.92 kg/m².    Wt Readings from Last 3 Encounters:   08/26/24 57.7 kg (127 lb 3.2 oz)   07/29/24 57.2 kg (126 lb)   07/22/24 56.7 kg (125 lb)           6/11/2024     9:12 AM   PHQ-9    Little interest or pleasure in doing things 0   Feeling down, depressed, or hopeless 0   PHQ-2 Score 0   PHQ-9 Total Score 0        Current Outpatient Medications   Medication Instructions    cyanocobalamin 100 mcg, Oral, DAILY    dicyclomine (BENTYL) 20 mg, Oral, 4 TIMES DAILY PRN    linaclotide (LINZESS) 290 mcg, Oral, DAILY BEFORE BREAKFAST    Magnesium Citrate 100 MG CAPS Oral    Multiple Vitamins-Minerals (MULTIVITAMIN ADULTS PO) 1 tablet, Oral, DAILY    Multiple Vitamins-Minerals (PRESERVISION AREDS 2 PO) Oral    polyethylene glycol (GLYCOLAX) 17 g packet Oral, PRN    predniSONE (DELTASONE) 10 MG tablet 4 pills daily X 2 days, then 3 pills daily X 2 days, then 2 pills daily X 2 days, then 1 pill daily X 2 days, then 1/2 pill daily X 2 days    Probiotic Product (PROBIOTIC BLEND PO) Strength: ; Form: ; SIG: take 1 tab  daily    sennosides-docusate sodium (SENOKOT-S) 8.6-50 MG tablet 1 tablet, Oral, DAILY    sucralfate (CARAFATE) 1 g, Oral, 4 TIMES DAILY, TAKE 1 TABLET BY MOUTH FOUR TIMES DAILY

## 2024-09-03 ENCOUNTER — TELEPHONE (OUTPATIENT)
Dept: INTERNAL MEDICINE CLINIC | Facility: CLINIC | Age: 84
End: 2024-09-03

## 2024-09-03 RX ORDER — VALACYCLOVIR HYDROCHLORIDE 1 G/1
1000 TABLET, FILM COATED ORAL 3 TIMES DAILY
Qty: 12 TABLET | Refills: 0 | Status: SHIPPED | OUTPATIENT
Start: 2024-09-03 | End: 2024-09-07

## 2024-09-03 NOTE — TELEPHONE ENCOUNTER
7 D duration is usually sufficient for majority of shingles outbreaks, but will provide pt with another few days at her request. If she continues to have issues after completing full 10 days of Valtrex & Steroid then can f/u with her PCP, Dr Luna.

## 2024-09-03 NOTE — TELEPHONE ENCOUNTER
Shingles started in her right eye was taking medicine ran out Monday morning    Wants 4 more days sent in  [facility] pharmacy on [address] in [place]  zalAcyclovir 1 gram tab requesting 3 more days

## 2024-09-17 ENCOUNTER — OFFICE VISIT (OUTPATIENT)
Dept: FAMILY MEDICINE CLINIC | Facility: CLINIC | Age: 84
End: 2024-09-17
Payer: MEDICARE

## 2024-09-17 VITALS
BODY MASS INDEX: 19.78 KG/M2 | DIASTOLIC BLOOD PRESSURE: 60 MMHG | HEIGHT: 67 IN | SYSTOLIC BLOOD PRESSURE: 120 MMHG | WEIGHT: 126 LBS

## 2024-09-17 DIAGNOSIS — Z88.9 DRUG ALLERGY: Primary | ICD-10-CM

## 2024-09-17 DIAGNOSIS — M27.2 ODONTOGENIC INFECTION OF JAW: ICD-10-CM

## 2024-09-17 PROCEDURE — G8399 PT W/DXA RESULTS DOCUMENT: HCPCS | Performed by: FAMILY MEDICINE

## 2024-09-17 PROCEDURE — 1123F ACP DISCUSS/DSCN MKR DOCD: CPT | Performed by: FAMILY MEDICINE

## 2024-09-17 PROCEDURE — 1090F PRES/ABSN URINE INCON ASSESS: CPT | Performed by: FAMILY MEDICINE

## 2024-09-17 PROCEDURE — G8420 CALC BMI NORM PARAMETERS: HCPCS | Performed by: FAMILY MEDICINE

## 2024-09-17 PROCEDURE — G8427 DOCREV CUR MEDS BY ELIG CLIN: HCPCS | Performed by: FAMILY MEDICINE

## 2024-09-17 PROCEDURE — 1036F TOBACCO NON-USER: CPT | Performed by: FAMILY MEDICINE

## 2024-09-17 PROCEDURE — 99214 OFFICE O/P EST MOD 30 MIN: CPT | Performed by: FAMILY MEDICINE

## 2024-09-17 PROCEDURE — G2211 COMPLEX E/M VISIT ADD ON: HCPCS | Performed by: FAMILY MEDICINE

## 2024-09-17 RX ORDER — PREDNISONE 10 MG/1
TABLET ORAL
Qty: 21 EACH | Refills: 0 | Status: SHIPPED | OUTPATIENT
Start: 2024-09-17

## 2024-09-17 ASSESSMENT — ENCOUNTER SYMPTOMS
CHEST TIGHTNESS: 0
BLOOD IN STOOL: 0
SHORTNESS OF BREATH: 0
ABDOMINAL PAIN: 0

## 2025-01-17 DIAGNOSIS — K59.09 CHRONIC CONSTIPATION: ICD-10-CM

## 2025-01-17 NOTE — TELEPHONE ENCOUNTER
Patient called in wanting to transfer her medication to another pharmacy. Patient stated that this is for her Linzess. Patient reports that her medication is currently with [facility] prescription order and now she wants the rx sent to [facility] in [place]. Address is [address] [place] [address] [place] [address]. Advised patient that I will send her  provider this information. Patient voiced understanding. DI

## 2025-04-16 DIAGNOSIS — K59.09 CHRONIC CONSTIPATION: ICD-10-CM

## 2025-04-17 RX ORDER — LINACLOTIDE 290 UG/1
CAPSULE, GELATIN COATED ORAL
Qty: 90 CAPSULE | Refills: 0 | OUTPATIENT
Start: 2025-04-17

## 2025-07-17 DIAGNOSIS — K59.09 CHRONIC CONSTIPATION: ICD-10-CM

## 2025-07-17 RX ORDER — LINACLOTIDE 290 UG/1
290 CAPSULE, GELATIN COATED ORAL
Qty: 90 CAPSULE | Refills: 1 | Status: SHIPPED | OUTPATIENT
Start: 2025-07-17

## 2025-09-03 ENCOUNTER — LAB (OUTPATIENT)
Dept: FAMILY MEDICINE CLINIC | Facility: CLINIC | Age: 85
End: 2025-09-03

## 2025-09-03 ENCOUNTER — OFFICE VISIT (OUTPATIENT)
Dept: FAMILY MEDICINE CLINIC | Facility: CLINIC | Age: 85
End: 2025-09-03
Payer: MEDICARE

## 2025-09-03 VITALS
HEART RATE: 59 BPM | WEIGHT: 125 LBS | DIASTOLIC BLOOD PRESSURE: 60 MMHG | SYSTOLIC BLOOD PRESSURE: 138 MMHG | BODY MASS INDEX: 19.62 KG/M2 | HEIGHT: 67 IN

## 2025-09-03 DIAGNOSIS — Z23 NEED FOR VACCINATION: ICD-10-CM

## 2025-09-03 DIAGNOSIS — K21.9 GASTROESOPHAGEAL REFLUX DISEASE WITHOUT ESOPHAGITIS: ICD-10-CM

## 2025-09-03 DIAGNOSIS — Z00.00 MEDICARE ANNUAL WELLNESS VISIT, SUBSEQUENT: Primary | ICD-10-CM

## 2025-09-03 DIAGNOSIS — K59.09 CHRONIC CONSTIPATION: ICD-10-CM

## 2025-09-03 LAB
ALBUMIN SERPL-MCNC: 3.8 G/DL (ref 3.2–4.6)
ALBUMIN/GLOB SERPL: 1.3 (ref 1–1.9)
ALP SERPL-CCNC: 139 U/L (ref 35–104)
ALT SERPL-CCNC: 60 U/L (ref 8–45)
ANION GAP SERPL CALC-SCNC: 10 MMOL/L (ref 7–16)
AST SERPL-CCNC: 66 U/L (ref 15–37)
BASOPHILS # BLD: 0.04 K/UL (ref 0–0.2)
BASOPHILS NFR BLD: 0.6 % (ref 0–2)
BILIRUB SERPL-MCNC: 0.3 MG/DL (ref 0–1.2)
BUN SERPL-MCNC: 9 MG/DL (ref 8–23)
CALCIUM SERPL-MCNC: 9.4 MG/DL (ref 8.8–10.2)
CHLORIDE SERPL-SCNC: 97 MMOL/L (ref 98–107)
CO2 SERPL-SCNC: 27 MMOL/L (ref 20–29)
CREAT SERPL-MCNC: 0.9 MG/DL (ref 0.6–1.1)
DIFFERENTIAL METHOD BLD: ABNORMAL
EOSINOPHIL # BLD: 0.08 K/UL (ref 0–0.8)
EOSINOPHIL NFR BLD: 1.2 % (ref 0.5–7.8)
ERYTHROCYTE [DISTWIDTH] IN BLOOD BY AUTOMATED COUNT: 13.2 % (ref 11.9–14.6)
GLOBULIN SER CALC-MCNC: 2.9 G/DL (ref 2.3–3.5)
GLUCOSE SERPL-MCNC: 83 MG/DL (ref 70–99)
HCT VFR BLD AUTO: 37.8 % (ref 35.8–46.3)
HGB BLD-MCNC: 12.4 G/DL (ref 11.7–15.4)
IMM GRANULOCYTES # BLD AUTO: 0.02 K/UL (ref 0–0.5)
IMM GRANULOCYTES NFR BLD AUTO: 0.3 % (ref 0–5)
LYMPHOCYTES # BLD: 1.22 K/UL (ref 0.5–4.6)
LYMPHOCYTES NFR BLD: 18.1 % (ref 13–44)
MCH RBC QN AUTO: 31.4 PG (ref 26.1–32.9)
MCHC RBC AUTO-ENTMCNC: 32.8 G/DL (ref 31.4–35)
MCV RBC AUTO: 95.7 FL (ref 82–102)
MONOCYTES # BLD: 1.12 K/UL (ref 0.1–1.3)
MONOCYTES NFR BLD: 16.6 % (ref 4–12)
NEUTS SEG # BLD: 4.25 K/UL (ref 1.7–8.2)
NEUTS SEG NFR BLD: 63.2 % (ref 43–78)
NRBC # BLD: 0 K/UL (ref 0–0.2)
PLATELET # BLD AUTO: 332 K/UL (ref 150–450)
PMV BLD AUTO: 9.6 FL (ref 9.4–12.3)
POTASSIUM SERPL-SCNC: 4.9 MMOL/L (ref 3.5–5.1)
PROT SERPL-MCNC: 6.6 G/DL (ref 6.3–8.2)
RBC # BLD AUTO: 3.95 M/UL (ref 4.05–5.2)
SODIUM SERPL-SCNC: 134 MMOL/L (ref 136–145)
TSH, 3RD GENERATION: 2.7 UIU/ML (ref 0.27–4.2)
WBC # BLD AUTO: 6.7 K/UL (ref 4.3–11.1)

## 2025-09-03 PROCEDURE — G8427 DOCREV CUR MEDS BY ELIG CLIN: HCPCS | Performed by: FAMILY MEDICINE

## 2025-09-03 PROCEDURE — 1160F RVW MEDS BY RX/DR IN RCRD: CPT | Performed by: FAMILY MEDICINE

## 2025-09-03 PROCEDURE — G8420 CALC BMI NORM PARAMETERS: HCPCS | Performed by: FAMILY MEDICINE

## 2025-09-03 PROCEDURE — 1090F PRES/ABSN URINE INCON ASSESS: CPT | Performed by: FAMILY MEDICINE

## 2025-09-03 PROCEDURE — G2211 COMPLEX E/M VISIT ADD ON: HCPCS | Performed by: FAMILY MEDICINE

## 2025-09-03 PROCEDURE — 1123F ACP DISCUSS/DSCN MKR DOCD: CPT | Performed by: FAMILY MEDICINE

## 2025-09-03 PROCEDURE — 1159F MED LIST DOCD IN RCRD: CPT | Performed by: FAMILY MEDICINE

## 2025-09-03 PROCEDURE — 1036F TOBACCO NON-USER: CPT | Performed by: FAMILY MEDICINE

## 2025-09-03 PROCEDURE — 99214 OFFICE O/P EST MOD 30 MIN: CPT | Performed by: FAMILY MEDICINE

## 2025-09-03 PROCEDURE — G0439 PPPS, SUBSEQ VISIT: HCPCS | Performed by: FAMILY MEDICINE

## 2025-09-03 PROCEDURE — G8399 PT W/DXA RESULTS DOCUMENT: HCPCS | Performed by: FAMILY MEDICINE

## 2025-09-03 RX ORDER — INFLUENZA A VIRUS A/GEORGIA/12/2022 CRV-167 (H1N1) ANTIGEN (MDCK CELL DERIVED, PROPIOLACTONE INACTIVATED), INFLUENZA A VIRUS A/SYDNEY/1304/2022 (H3N2) ANTIGEN (MDCK CELL DERIVED, PROPIOLACTONE INACTIVATED), INFLUENZA B VIRUS B/SINGAPORE/WUH4618/2021 ANTIGEN (MDCK CELL DERIVED, PROPIOLACTONE INACTIVATED) 15; 15; 15 UG/.5ML; UG/.5ML; UG/.5ML
0.5 INJECTION, SUSPENSION INTRAMUSCULAR ONCE
Qty: 0.5 ML | Refills: 0 | Status: SHIPPED | OUTPATIENT
Start: 2025-09-03 | End: 2025-09-03 | Stop reason: SDUPTHER

## 2025-09-03 RX ORDER — INFLUENZA A VIRUS A/GEORGIA/12/2022 CRV-167 (H1N1) ANTIGEN (MDCK CELL DERIVED, PROPIOLACTONE INACTIVATED), INFLUENZA A VIRUS A/SYDNEY/1304/2022 (H3N2) ANTIGEN (MDCK CELL DERIVED, PROPIOLACTONE INACTIVATED), INFLUENZA B VIRUS B/SINGAPORE/WUH4618/2021 ANTIGEN (MDCK CELL DERIVED, PROPIOLACTONE INACTIVATED) 15; 15; 15 UG/.5ML; UG/.5ML; UG/.5ML
0.5 INJECTION, SUSPENSION INTRAMUSCULAR ONCE
Qty: 0.5 ML | Refills: 0 | Status: SHIPPED | OUTPATIENT
Start: 2025-09-03 | End: 2025-09-03

## 2025-09-03 SDOH — ECONOMIC STABILITY: FOOD INSECURITY: WITHIN THE PAST 12 MONTHS, THE FOOD YOU BOUGHT JUST DIDN'T LAST AND YOU DIDN'T HAVE MONEY TO GET MORE.: PATIENT DECLINED

## 2025-09-03 SDOH — ECONOMIC STABILITY: FOOD INSECURITY: WITHIN THE PAST 12 MONTHS, YOU WORRIED THAT YOUR FOOD WOULD RUN OUT BEFORE YOU GOT MONEY TO BUY MORE.: PATIENT DECLINED

## 2025-09-03 ASSESSMENT — PATIENT HEALTH QUESTIONNAIRE - PHQ9
2. FEELING DOWN, DEPRESSED OR HOPELESS: NOT AT ALL
SUM OF ALL RESPONSES TO PHQ QUESTIONS 1-9: 0
SUM OF ALL RESPONSES TO PHQ QUESTIONS 1-9: 0
1. LITTLE INTEREST OR PLEASURE IN DOING THINGS: NOT AT ALL
SUM OF ALL RESPONSES TO PHQ QUESTIONS 1-9: 0
SUM OF ALL RESPONSES TO PHQ QUESTIONS 1-9: 0

## 2025-09-03 ASSESSMENT — ENCOUNTER SYMPTOMS
BLOOD IN STOOL: 0
ABDOMINAL PAIN: 0
CONSTIPATION: 1
SHORTNESS OF BREATH: 0
CHEST TIGHTNESS: 0

## 2025-09-03 ASSESSMENT — LIFESTYLE VARIABLES
HOW MANY STANDARD DRINKS CONTAINING ALCOHOL DO YOU HAVE ON A TYPICAL DAY: PATIENT DOES NOT DRINK
HOW OFTEN DO YOU HAVE A DRINK CONTAINING ALCOHOL: NEVER

## (undated) DEVICE — SOLUTION IRRIG 3000ML 0.9% SOD CHL USP UROMATIC PLAS CONT

## (undated) DEVICE — SUTURE VCRL + SZ 3-0 L18IN ABSRB UD SH 1/2 CIR TAPERCUT NDL VCP864D

## (undated) DEVICE — PUMP SUC IRR TBNG L10FT W/ HNDPC ASSEMB STRYKEFLOW 2

## (undated) DEVICE — APPLIER CLP M L L11.4IN DIA10MM ENDOSCP ROT MULT FOR LIG

## (undated) DEVICE — TROCAR: Brand: KII FIOS FIRST ENTRY

## (undated) DEVICE — TROCAR ENDOSCP L102MM DIA10MM STD CANN BLNT TIP W/ FIX BLLN

## (undated) DEVICE — TUBING INSUFFLATION SMK EVAC HI FLO SET PNEUMOCLEAR

## (undated) DEVICE — TROCAR: Brand: KII® SLEEVE

## (undated) DEVICE — CANISTER, RIGID, 2000CC: Brand: MEDLINE INDUSTRIES, INC.

## (undated) DEVICE — LOGICUT SCISSOR LENGTH 320MM: Brand: LOGI - LAPAROSCOPIC INSTRUMENT SYSTEM

## (undated) DEVICE — MASTISOL ADHESIVE LIQ 2/3ML

## (undated) DEVICE — SUTURE SZ 0 27IN 5/8 CIR UR-6  TAPER PT VIOLET ABSRB VICRYL J603H

## (undated) DEVICE — BAG SPEC REM 224ML W4XL6IN DIA10MM 1 HND GYN DISP ENDOPCH

## (undated) DEVICE — SUTURE VCRL SZ 4-0 L27IN ABSRB UD L19MM PS-2 3/8 CIR PRIM J426H

## (undated) DEVICE — GAUZE,SPONGE,2"X2",8PLY,STERILE,LF,2'S: Brand: MEDLINE

## (undated) DEVICE — APPLICATOR MEDICATED 26 CC SOLUTION HI LT ORNG CHLORAPREP

## (undated) DEVICE — NEEDLE HYPO 21GA L1.5IN INTRAMUSCULAR S STL LATCH BVL UP

## (undated) DEVICE — ABSORBENT, WATERPROOF, BACTERIA PROOF FILM DRESSING: Brand: OPSITE POST OP 20X10CM CTN 20

## (undated) DEVICE — STAPLER, SKIN, 35W, A: Brand: MEDLINE INDUSTRIES, INC.

## (undated) DEVICE — TOTAL TRAY, DB, 100% SILI FOLEY, 16FR 10: Brand: MEDLINE

## (undated) DEVICE — GLOVE SURG SZ 7 L12IN FNGR THK79MIL GRN LTX FREE

## (undated) DEVICE — GLOVE SURG SZ 75 L12IN FNGR THK79MIL GRN LTX FREE

## (undated) DEVICE — 3M™ TEGADERM™ TRANSPARENT FILM DRESSING FRAME STYLE, 1624W, 2-3/8 IN X 2-3/4 IN (6 CM X 7 CM), 100/CT 4CT/CASE: Brand: 3M™ TEGADERM™

## (undated) DEVICE — SOLUTION ANTIFOG VIS SYS CLEARIFY LAPSCP

## (undated) DEVICE — TROCARS: Brand: KII® BALLOON BLUNT TIP SYSTEM

## (undated) DEVICE — SUTURE PDS II SZ 0 L60IN ABSRB VLT L65MM TP-1 1/2 CIR Z991G

## (undated) DEVICE — GLOVE ORANGE PI 8 1/2   MSG9085

## (undated) DEVICE — SUTURE VCRL SZ 4-0 L18IN ABSRB UD L19MM PS-2 3/8 CIR PRIM J496H

## (undated) DEVICE — INTENDED FOR TISSUE SEPARATION, AND OTHER PROCEDURES THAT REQUIRE A SHARP SURGICAL BLADE TO PUNCTURE OR CUT.: Brand: BARD-PARKER ® STAINLESS STEEL BLADES

## (undated) DEVICE — ADHESIVE SKIN CLSR 0.7ML TOP DERMBND ADV

## (undated) DEVICE — GENERAL LAPAROSCOPY: Brand: MEDLINE INDUSTRIES, INC.

## (undated) DEVICE — LAPAROSCOPIC TROCAR SLEEVE/SINGLE USE: Brand: KII® OPTICAL ACCESS SYSTEM

## (undated) DEVICE — SUTURE VCRL SZ 3-0 L27IN ABSRB UD L26MM SH 1/2 CIR J416H

## (undated) DEVICE — APPLIER LIG CLP L13IN 10MM PSTL GRP CONTAIN 15 TI L CLP

## (undated) DEVICE — STRIP,CLOSURE,WOUND,MEDI-STRIP,1/2X4: Brand: MEDLINE